# Patient Record
Sex: FEMALE | Race: BLACK OR AFRICAN AMERICAN | Employment: FULL TIME | URBAN - METROPOLITAN AREA
[De-identification: names, ages, dates, MRNs, and addresses within clinical notes are randomized per-mention and may not be internally consistent; named-entity substitution may affect disease eponyms.]

---

## 2017-01-23 ENCOUNTER — HOSPITAL ENCOUNTER (OUTPATIENT)
Dept: MAMMOGRAPHY | Age: 47
Discharge: HOME | End: 2017-01-23
Attending: FAMILY MEDICINE
Payer: COMMERCIAL

## 2017-01-23 ENCOUNTER — OFFICE VISIT (OUTPATIENT)
Dept: FAMILY MEDICINE CLINIC | Age: 47
End: 2017-01-23

## 2017-01-23 VITALS
OXYGEN SATURATION: 96 % | DIASTOLIC BLOOD PRESSURE: 85 MMHG | SYSTOLIC BLOOD PRESSURE: 132 MMHG | HEIGHT: 64 IN | WEIGHT: 187 LBS | RESPIRATION RATE: 16 BRPM | TEMPERATURE: 98.8 F | HEART RATE: 90 BPM | BODY MASS INDEX: 31.92 KG/M2

## 2017-01-23 DIAGNOSIS — Z00.00 ROUTINE GENERAL MEDICAL EXAMINATION AT A HEALTH CARE FACILITY: Primary | ICD-10-CM

## 2017-01-23 DIAGNOSIS — K58.0 IRRITABLE BOWEL SYNDROME WITH DIARRHEA: ICD-10-CM

## 2017-01-23 DIAGNOSIS — K21.9 GASTROESOPHAGEAL REFLUX DISEASE WITHOUT ESOPHAGITIS: ICD-10-CM

## 2017-01-23 DIAGNOSIS — Z13.1 SCREENING FOR DIABETES MELLITUS: ICD-10-CM

## 2017-01-23 DIAGNOSIS — Z12.31 VISIT FOR SCREENING MAMMOGRAM: ICD-10-CM

## 2017-01-23 DIAGNOSIS — Z23 ENCOUNTER FOR IMMUNIZATION: ICD-10-CM

## 2017-01-23 DIAGNOSIS — E55.9 HYPOVITAMINOSIS D: ICD-10-CM

## 2017-01-23 LAB
BILIRUB UR QL STRIP: NEGATIVE
GLUCOSE UR-MCNC: NEGATIVE MG/DL
HBA1C MFR BLD HPLC: 6 %
KETONES P FAST UR STRIP-MCNC: NEGATIVE MG/DL
PH UR STRIP: 7 [PH] (ref 4.6–8)
PROT UR QL STRIP: NEGATIVE MG/DL
SP GR UR STRIP: 1.01 (ref 1–1.03)
UA UROBILINOGEN AMB POC: NORMAL (ref 0.2–1)
URINALYSIS CLARITY POC: CLEAR
URINALYSIS COLOR POC: YELLOW
URINE BLOOD POC: NEGATIVE
URINE LEUKOCYTES POC: NORMAL
URINE NITRITES POC: NEGATIVE

## 2017-01-23 PROCEDURE — 77067 SCR MAMMO BI INCL CAD: CPT

## 2017-01-23 RX ORDER — PANTOPRAZOLE SODIUM 40 MG/1
40 TABLET, DELAYED RELEASE ORAL DAILY
Qty: 30 TAB | Refills: 3 | Status: SHIPPED | OUTPATIENT
Start: 2017-01-23 | End: 2018-01-31 | Stop reason: SDUPTHER

## 2017-01-23 RX ORDER — SODIUM FLUORIDE/POTASSIUM NIT 1.1 %-5 %
PASTE (ML) DENTAL
Refills: 3 | COMMUNITY
Start: 2016-11-09 | End: 2021-11-15 | Stop reason: ALTCHOICE

## 2017-01-23 NOTE — PROGRESS NOTES
Subjective:   51 y.o. female for Well Woman Check. Her gyne and breast care is done elsewhere by her Ob-Gyne physician. Patient Active Problem List   Diagnosis Code    Hypovitaminosis D E55.9    Herpes genitalia A60.00       Current Outpatient Prescriptions   Medication Sig Dispense Refill    PREVIDENT 5000 ENAMEL PROTECT 1.1-5 % pste APPLY TWO TIMES A DAY  3       No Known Allergies    Past Medical History   Diagnosis Date    Environmental allergies     GERD (gastroesophageal reflux disease)     Herpes genitalia        Past Surgical History   Procedure Laterality Date    Hx gyn  1991     Laser- HPV       Family History   Problem Relation Age of Onset    Heart Disease Mother     Bleeding Prob Mother      was on coumadin    Stroke Father     Cancer Sister      lymphoma    Diabetes Brother     Schizophrenia Brother     Diabetes Sister     Diabetes Sister     Mental Retardation Sister     No Known Problems Sister     Other Brother      paralysis from car accident    Heart Disease Brother     Pacemaker Brother        Social History   Substance Use Topics    Smoking status: Never Smoker    Smokeless tobacco: Never Used    Alcohol use Yes      Comment: occasional            ROS: Feeling generally well. No TIA's or unusual headaches, no dysphagia. No prolonged cough. No dyspnea or chest pain on exertion. No abdominal pain, change in bowel habits, black or bloody stools. No urinary tract symptoms. No new or unusual musculoskeletal symptoms. C/o stomach cramping and having the urgency for BM. Stools are usually loose. Has not been able to note what food triggers her sx. Also has belching and burping after eating and sometimes has heart burn in the chest.  Sx occur about 3-4 times in a week. Started with issues with stomach about 3 to 4 months ago. No nausea or vomiting noted. No blood or mucous in the stool. Has a niece with Crohn's disease noted in family hx. Objective:    The patient appears well, alert, oriented x 3, in no distress. Visit Vitals    /85 (BP 1 Location: Left arm, BP Patient Position: Sitting)    Pulse 90    Temp 98.8 °F (37.1 °C) (Oral)    Resp 16    Ht 5' 4\" (1.626 m)    Wt 187 lb (84.8 kg)    LMP 01/08/2017    SpO2 96%    BMI 32.1 kg/m2     ENT normal.  Neck supple. No adenopathy or thyromegaly. RADHA. Lungs are clear, good air entry, no wheezes, rhonchi or rales. S1 and S2 normal, no murmurs, regular rate and rhythm. Abdomen soft without tenderness, guarding, mass or organomegaly. Extremities show no edema, normal peripheral pulses. Neurological is normal, no focal findings. Breast and Pelvic exams are deferred. Assessment/Plan:   Well Woman  lose weight, increase physical activity, limit alcohol consumption, follow low fat diet, follow low salt diet, routine labs ordered  Elma Griffin was seen today for complete physical.    Diagnoses and all orders for this visit:    Routine general medical examination at a health care facility  -     AMB POC URINALYSIS DIP STICK AUTO W/O MICRO  -     CBC W/O DIFF  -     LIPID PANEL  -     METABOLIC PANEL, COMPREHENSIVE    Irritable bowel syndrome with diarrhea  -     CELIAC ANTIBODY PROFILE  -     FOOD ALLERGY PROFILE  -     REFERRAL TO GASTROENTEROLOGY  -     US ABD COMP; Future    Gastroesophageal reflux disease without esophagitis  -     REFERRAL TO GASTROENTEROLOGY  -     US ABD COMP; Future  -     pantoprazole (PROTONIX) 40 mg tablet; Take 1 Tab by mouth daily. The pathophysiology of reflux is discussed. Anti-reflux measures such as raising the head of the bed, avoiding tight clothing or belts, avoiding eating late at night and not lying down shortly after mealtime and achieving weight loss are discussed.  Avoid ASA, NSAID's, caffeine, peppermints, chocolate and other food triggers, alcohol and tobacco.     Hypovitaminosis D  -     VITAMIN D, 25 HYDROXY    Screening for diabetes mellitus  -     AMB POC HEMOGLOBIN A1C    Encounter for immunization  -     Influenza virus vaccine (QUADRIVALENT PRES FREE SYRINGE) IM 3 years and older  -     HI IMMUNIZ ADMIN,1 SINGLE/COMB VAC/TOXOID      Follow-up Disposition:  Return in about 3 months (around 4/23/2017). reviewed diet, exercise and weight control  cardiovascular risk and specific lipid/LDL goals reviewed  reviewed medications and side effects in detail    I have discussed diagnosis listed in this note with pt and/or family. I have discussed treatment plans and options and the risk/benefit analysis of those options, including safe use of medications and possible medication side effects. Through the use of shared decision making we have agreed to the above plan. The patient has received an after-visit summary and questions were answered concerning future plans and follow up. Advise pt of any urgent changes then to proceed to the ER.

## 2017-01-23 NOTE — PATIENT INSTRUCTIONS
Irritable Bowel Syndrome: Care Instructions  Your Care Instructions  Irritable bowel syndrome, or IBS, is a problem with the intestines that causes belly pain, bloating, gas, constipation, and diarrhea. The cause of IBS is not well known. IBS can last for many years, but it does not get worse over time or lead to serious disease. Most people can control their symptoms by changing their diet and reducing stress. Follow-up care is a key part of your treatment and safety. Be sure to make and go to all appointments, and call your doctor if you are having problems. It's also a good idea to know your test results and keep a list of the medicines you take. How can you care for yourself at home? · For constipation:  ¨ Include fruits, vegetables, beans, and whole grains in your diet each day. These foods are high in fiber. ¨ Drink plenty of fluids, enough so that your urine is light yellow or clear like water. If you have kidney, heart, or liver disease and have to limit fluids, talk with your doctor before you increase the amount of fluids you drink. ¨ Get some exercise every day. Build up slowly to 30 to 60 minutes a day on 5 or more days of the week. ¨ Take a fiber supplement, such as Citrucel or Metamucil, every day if needed. Read and follow all instructions on the label. ¨ Schedule time each day for a bowel movement. Having a daily routine may help. Take your time and do not strain when having a bowel movement. · If you often have diarrhea, limit foods and drinks that make it worse. These are different for each person but may include caffeine (found in coffee, tea, chocolate, and cola drinks), alcohol, fatty foods, gas-producing foods (such as beans, cabbage, and broccoli), some dairy products, and spicy foods. Do not eat candy or gum that contains sorbitol. · Keep a daily diary of what you eat and what symptoms you have. This may help find foods that cause you problems. · Eat slowly.  Try to make mealtime relaxing. · Find ways to reduce stress. · Get at least 30 minutes of exercise on most days of the week. Exercise can help reduce tension and prevent constipation. Walking is a good choice. You also may want to do other activities, such as running, swimming, cycling, or playing tennis or team sports. When should you call for help? Call your doctor now or seek immediate medical care if:  · Your pain is different than usual or occurs with fever. · You lose weight without trying, or you lose your appetite and you do not know why. · Your symptoms often wake you from sleep. · Your stools are black and tarlike or have streaks of blood. Watch closely for changes in your health, and be sure to contact your doctor if:  · Your IBS symptoms get worse or begin to disrupt your day-to-day life. · You become more tired than usual.  · Your home treatment stops working. Where can you learn more? Go to http://[facility].info/. Enter H159 in the search box to learn more about \"Irritable Bowel Syndrome: Care Instructions. \"  Current as of: August 9, 2016  Content Version: 11.1  © 9475-9789 [employer]. Care instructions adapted under license by [employer] (which disclaims liability or warranty for this information). If you have questions about a medical condition or this instruction, always ask your healthcare professional. [address] any warranty or liability for your use of this information. Diet for Irritable Bowel Syndrome: Care Instructions  Your Care Instructions  Irritable bowel syndrome, or IBS, is a problem with the intestines. IBS can cause belly pain, bloating, gas, constipation, and diarrhea. Most people can control their symptoms by changing their diet and easing stress. No specific foods cause everyone with IBS to have symptoms. Doctors don't offer a specific diet to manage symptoms.  But many people find that they feel better when they stop eating certain foods. A high-fiber diet may help if you have constipation. Follow-up care is a key part of your treatment and safety. Be sure to make and go to all appointments, and call your doctor if you are having problems. It's also a good idea to know your test results and keep a list of the medicines you take. How can you care for yourself at home? To reduce constipation  · Include fruits, vegetables, beans, and whole grains in your diet each day. These foods are high in fiber. Slowly increase the amount of fiber you eat. This helps you avoid a lot of gas. · Drink plenty of fluids, enough so that your urine is light yellow or clear like water. If you have kidney, heart, or liver disease and have to limit fluids, talk with your doctor before you increase the amount of fluids you drink. · Get some exercise every day. Build up slowly to 30 to 60 minutes a day on 5 or more days of the week. · Take a fiber supplement, such as Citrucel or Metamucil, every day if needed. Read and follow all instructions on the label. · Schedule time each day for a bowel movement. Having a daily routine may help. Take your time and do not strain when having a bowel movement. · Check with your doctor before you increase the amount of fiber in your diet. For some people who have IBS, eating more fiber may make some symptoms worse. This includes bloating. To reduce diarrhea  You may try giving up foods or drinks one at a time to see whether symptoms improve.  Limit or avoid the following:  · Alcohol  · Caffeine, which is found in coffee, tea, cola drinks, and chocolate  · Nicotine, from smoking or chewing tobacco  · Gas-producing foods, such as beans, broccoli, cabbage, and apples  · Dairy products that contain lactose (milk sugar), such as ice cream, milk, cheese, and sour cream  · Foods and drinks high in sugar, especially fruit juice, soda, candy, and other packaged sweets (such as cookies)  · Foods high in fat, including jennings, sausage, butter, oils, and anything deep-fried  · Sorbitol and xylitol, artificial sweeteners found in some sugarless candies and chewing gum  Keep track of foods  · Some people with IBS use a daily food diary to keep track of what they eat and whether they have any symptoms after eating certain foods. The diary also can be a good way to record what is going on in your life. · Stress plays a role in IBS. So if you are aware that certain stresses bring on symptoms, you can try to reduce those stresses. Keep mealtimes pleasant  · Try to maintain a pleasant environment when you eat. This may reduce stress that can make symptoms likely to occur. · Give yourself plenty of time to eat, rather than eating on the go. Chew your food slowly. Try not to swallow air, which can cause bloating. Where can you learn more? Go to http://[facility]carlos-[facility]anna.info/. Enter S488 in the search box to learn more about \"Diet for Irritable Bowel Syndrome: Care Instructions. \"  Current as of: July 26, 2016  Content Version: 11.1  © 7408-3875 [employer]. Care instructions adapted under license by [employer] (which disclaims liability or warranty for this information). If you have questions about a medical condition or this instruction, always ask your healthcare professional. [address] any warranty or liability for your use of this information. Celiac Disease: Care Instructions  Your Care Instructions  Celiac disease (or celiac sprue) is a problem with digesting gluten. Gluten is a type of protein found in wheat, rye, and other grains. This problem starts when the body's immune system attacks the small intestine when gluten is eaten. The immune system is supposed to fight off viruses and other invaders, but sometimes it turns on the person's own body. (This is called an autoimmune disease.) Celiac disease seems to run in families.   Celiac disease causes damage to the small intestine. This makes it hard for the body to absorb vitamins and other nutrients. You cannot prevent celiac disease. But you can stop and reverse the damage to the small intestine by eating a strict gluten-free diet. Follow-up care is a key part of your treatment and safety. Be sure to make and go to all appointments, and call your doctor if you are having problems. It's also a good idea to know your test results and keep a list of the medicines you take. How can you care for yourself at home? · Eat a gluten-free diet to prevent symptoms and damage to the small intestine. Even a small amount of gluten may cause damage. ¨ Avoid all foods that contain wheat, rye, and barley gluten. Bread, bagels, pasta, pizza, malted breakfast cereals, and crackers are all examples of foods that contain gluten. ¨ Avoid oats, at least at first. Oats cause symptoms in some people. After your symptoms stop, you may be able to add oats to your diet. · You may need to avoid milk and milk products for a while. Once you stop eating any gluten, the intestine will begin to heal. Then it should be okay to drink milk and eat milk products. · Read food labels carefully and look for hidden gluten, such as gluten in medicine and some food additives. If a label says \"modified food starch,\" the product may contain gluten. · Plan your diet around:  ¨ Eggs. ¨ Dairy products, if you can eat them. Cheese, yogurt, and other dairy products can be an important part of the diet. ¨ Flours and foods made with amaranth, arrowroot, beans, buckwheat, corn, cornmeal, flax, millet, potatoes, pure uncontaminated nut and oat bran, quinoa, rice, sorghum, soybeans, tapioca, or teff. ¨ Fresh, frozen, and canned meats, fruits, and vegetables. Watch for added gluten. · Talk to your doctor or contact your local hospital or dietitian for information about support groups in your area.  You may find a support group helpful for discovering ways to help you deal with celiac disease. Celiac disease support groups often share recipes and good food sources. · Look for gluten-free foods. Many food stores, especially health food stores, offer specially marked gluten-free food. When should you call for help? Watch closely for changes in your health, and be sure to contact your doctor if:  · Your bloating, gas, and diarrhea get worse. · You have bloating, gas, and diarrhea after not having them for a while. Where can you learn more? Go to http://jcarlos-breanna.info/. Enter [id number] in the search box to learn more about \"Celiac Disease: Care Instructions. \"  Current as of: August 9, 2016  Content Version: 11.1  © 4298-6953 [employer]. Care instructions adapted under license by [employer] (which disclaims liability or warranty for this information). If you have questions about a medical condition or this instruction, always ask your healthcare professional. [place] [address] any warranty or liability for your use of this information. Gluten-Free Diet: Care Instructions  Your Care Instructions  To help your symptoms, your doctor has recommended a gluten-free diet. This means not eating foods that have gluten in them. Gluten is a kind of protein. It's found in wheat, barley, and rye. If you eat a gluten-free diet, you can help manage your symptoms and prevent long-term problems. You can also get all the nutrition you need. Follow-up care is a key part of your treatment and safety. Be sure to make and go to all appointments, and call your doctor if you are having problems. It's also a good idea to know your test results and keep a list of the medicines you take. How can you care for yourself at home? · Don't eat any foods that have gluten in them. These include bagels, bread, crackers, and some cereals. They also include pasta and pizza. · Carefully read food labels.  Look for wheat or wheat products in ice cream and candy. You may also find them in salad dressing, canned and frozen soups and vegetables, and other processed foods. · Avoid all beer products unless the label says they are gluten-free. Beers with and without alcohol have gluten unless the labels say they are gluten-free. This includes lagers, ales, and stouts. · When you eat out, look for restaurants that serve gluten-free food. You can also ask if the  is familiar with gluten-free cooking. · Try to learn more about gluten-free options. Find grocery stores that sell gluten-free pizza and other foods. If you have access to the Internet, look online for gluten-free foods and recipes. · On a gluten-free eating plan, it's okay to have:  ¨ Eggs and dairy products. (But some dairy products may make your symptoms worse. Ask your doctor if you have questions about dairy products. Read ingredient labels carefully. Some processed cheeses contain gluten.)  ¨ Flours and foods made with amaranth, arrowroot, beans, buckwheat, corn, cornmeal, flax, millet, potatoes, pure uncontaminated nut and oat bran, quinoa, rice, sorghum, soybeans, tapioca, or teff. ¨ Fresh, frozen, or canned unprocessed meats. But avoid processed meats. Some examples of processed meats to avoid are hot dogs, salami, and deli meat. Read labels for additives that may contain gluten. ¨ Fresh, frozen, dried, or canned fruits and vegetables, if they do not have thickeners or other additives that contain gluten. ¨ Some alcohol drinks. These include wine, liqueurs, and ciders. They also include liquor like whiskey and lien. When should you call for help? Watch closely for changes in your health, and be sure to contact your doctor if:  · You have unexplained weight loss. · You have diarrhea that lasts longer than 1 to 2 weeks.   · You have unusual fatigue or mood changes, especially if these last more than a week and are not related to any other illness, such as the flu.  · Your symptoms come back again. · Your stomach pain gets worse. Where can you learn more? Go to http://jcarlos-breanna.info/. Enter [id number] in the search box to learn more about \"Gluten-Free Diet: Care Instructions. \"  Current as of: July 26, 2016  Content Version: 11.1  © 3194-8198 [employer]. Care instructions adapted under license by [employer] (which disclaims liability or warranty for this information). If you have questions about a medical condition or this instruction, always ask your healthcare professional. [address] any warranty or liability for your use of this information.

## 2017-01-23 NOTE — MR AVS SNAPSHOT
Visit Information Date & Time Provider Department Dept. Phone Encounter #  
 1/23/2017  2:15 PM Altaf FrankelGerber [phone] [id number] Follow-up Instructions Return in about 3 months (around 4/23/2017). Upcoming Health Maintenance Date Due  
 PAP AKA CERVICAL CYTOLOGY 2/11/2017 BREAST CANCER SCRN MAMMOGRAM 1/23/2018 DTaP/Tdap/Td series (2 - Td) 3/5/2024 Allergies as of 1/23/2017  Review Complete On: 1/23/2017 By: Altaf Frankel MD  
 No Known Allergies Current Immunizations  Reviewed on 1/23/2017 Name Date Influenza Vaccine [place] Going) 9/2/2015 Influenza Vaccine (Quad) PF 1/23/2017 Tdap 3/5/2014 Reviewed by Altaf Frankel MD on 1/23/2017 at  2:29 PM  
 Reviewed by Sathya Chan LPN on 5/19/2180 at  2:35 PM  
You Were Diagnosed With   
  
 Codes Comments Routine general medical examination at a health care facility    -  Primary ICD-10-CM: Z00.00 ICD-9-CM: V70.0 Irritable bowel syndrome with diarrhea     ICD-10-CM: K58.0 ICD-9-CM: 908.7 Gastroesophageal reflux disease without esophagitis     ICD-10-CM: K21.9 ICD-9-CM: 530.81 Hypovitaminosis D     ICD-10-CM: E55.9 ICD-9-CM: 268.9 Screening for diabetes mellitus     ICD-10-CM: Z13.1 ICD-9-CM: V77.1 Encounter for immunization     ICD-10-CM: D28 ICD-9-CM: V03.89 Vitals BP Pulse Temp Resp Height(growth percentile) Weight(growth percentile) 132/85 (BP 1 Location: Left arm, BP Patient Position: Sitting) 90 98.8 °F (37.1 °C) (Oral) 16 5' 4\" (1.626 m) 187 lb (84.8 kg) LMP SpO2 BMI OB Status Smoking Status 01/08/2017 96% 32.1 kg/m2 Having regular periods Never Smoker Vitals History BMI and BSA Data Body Mass Index Body Surface Area  
 32.1 kg/m 2 1.96 m 2 Preferred Pharmacy Pharmacy Name Phone [facility] [address] [phone] Your Updated Medication List  
  
   
This list is accurate as of: 1/23/17  3:21 PM.  Always use your most recent med list.  
  
  
  
  
 pantoprazole 40 mg tablet Commonly known as:  PROTONIX Take 1 Tab by mouth daily. PREVIDENT 5000 ENAMEL PROTECT 1.1-5 % Pste Generic drug:  sodium fluoride-pot nitrate APPLY TWO TIMES A DAY Prescriptions Sent to Pharmacy Refills  
 pantoprazole (PROTONIX) 40 mg tablet 3 Sig: Take 1 Tab by mouth daily. Class: Normal  
 Pharmacy: [facility] [place] [address], [address] Ph #: [phone] Route: Oral  
  
We Performed the Following AMB POC HEMOGLOBIN A1C [46052 CPT(R)] AMB POC URINALYSIS DIP STICK AUTO W/O MICRO [55208 CPT(R)] CBC W/O DIFF [80104 CPT(R)] CELIAC ANTIBODY PROFILE [[id number] Custom] FOOD ALLERGY PROFILE [53153 CPT(R)] INFLUENZA VIRUS VAC QUAD,SPLIT,PRESV FREE SYRINGE 3/> YRS IM [id number] CPT(R)] LIPID PANEL [05947 CPT(R)] METABOLIC PANEL, COMPREHENSIVE [20871 CPT(R)] TX IMMUNIZ ADMIN,1 SINGLE/COMB VAC/TOXOID [id number] CPT(R)] REFERRAL TO GASTROENTEROLOGY [KCL87 Custom] VITAMIN D, 25 HYDROXY [id number] CPT(R)] Follow-up Instructions Return in about 3 months (around 4/23/2017). To-Do List   
 01/23/2017 Imaging:  US ABD COMP Referral Information Referral ID Referred By Referred To  
  
 [id number] [facility] [address] [place], [address] [facility] Visits Status Start Date End Date 1 New Request 1/23/17 1/23/18 If your referral has a status of pending review or denied, additional information will be sent to support the outcome of this decision. Patient Instructions Irritable Bowel Syndrome: Care Instructions Your Care Instructions Irritable bowel syndrome, or IBS, is a problem with the intestines that causes belly pain, bloating, gas, constipation, and diarrhea. The cause of IBS is not well known. IBS can last for many years, but it does not get worse over time or lead to serious disease. Most people can control their symptoms by changing their diet and reducing stress. Follow-up care is a key part of your treatment and safety. Be sure to make and go to all appointments, and call your doctor if you are having problems. It's also a good idea to know your test results and keep a list of the medicines you take. How can you care for yourself at home? · For constipation: 
¨ Include fruits, vegetables, beans, and whole grains in your diet each day. These foods are high in fiber. ¨ Drink plenty of fluids, enough so that your urine is light yellow or clear like water. If you have kidney, heart, or liver disease and have to limit fluids, talk with your doctor before you increase the amount of fluids you drink. ¨ Get some exercise every day. Build up slowly to 30 to 60 minutes a day on 5 or more days of the week. ¨ Take a fiber supplement, such as Citrucel or Metamucil, every day if needed. Read and follow all instructions on the label. ¨ Schedule time each day for a bowel movement. Having a daily routine may help. Take your time and do not strain when having a bowel movement. · If you often have diarrhea, limit foods and drinks that make it worse. These are different for each person but may include caffeine (found in coffee, tea, chocolate, and cola drinks), alcohol, fatty foods, gas-producing foods (such as beans, cabbage, and broccoli), some dairy products, and spicy foods. Do not eat candy or gum that contains sorbitol. · Keep a daily diary of what you eat and what symptoms you have. This may help find foods that cause you problems. · Eat slowly. Try to make mealtime relaxing. · Find ways to reduce stress. · Get at least 30 minutes of exercise on most days of the week.  Exercise can help reduce tension and prevent constipation. Walking is a good choice. You also may want to do other activities, such as running, swimming, cycling, or playing tennis or team sports. When should you call for help? Call your doctor now or seek immediate medical care if: 
· Your pain is different than usual or occurs with fever. · You lose weight without trying, or you lose your appetite and you do not know why. · Your symptoms often wake you from sleep. · Your stools are black and tarlike or have streaks of blood. Watch closely for changes in your health, and be sure to contact your doctor if: 
· Your IBS symptoms get worse or begin to disrupt your day-to-day life. · You become more tired than usual. 
· Your home treatment stops working. Where can you learn more? Go to http://jcarlos-breanna.info/. Enter Z674 in the search box to learn more about \"Irritable Bowel Syndrome: Care Instructions. \" Current as of: August 9, 2016 Content Version: 11.1 © 0055-3354 [employer]GN ([employer]). Care instructions adapted under license by [employer] (which disclaims liability or warranty for this information). If you have questions about a medical condition or this instruction, always ask your healthcare professional. [address] any warranty or liability for your use of this information. Diet for Irritable Bowel Syndrome: Care Instructions Your Care Instructions Irritable bowel syndrome, or IBS, is a problem with the intestines. IBS can cause belly pain, bloating, gas, constipation, and diarrhea. Most people can control their symptoms by changing their diet and easing stress. No specific foods cause everyone with IBS to have symptoms. Doctors don't offer a specific diet to manage symptoms. But many people find that they feel better when they stop eating certain foods. A high-fiber diet may help if you have constipation. Follow-up care is a key part of your treatment and safety. Be sure to make and go to all appointments, and call your doctor if you are having problems. It's also a good idea to know your test results and keep a list of the medicines you take. How can you care for yourself at home? To reduce constipation · Include fruits, vegetables, beans, and whole grains in your diet each day. These foods are high in fiber. Slowly increase the amount of fiber you eat. This helps you avoid a lot of gas. · Drink plenty of fluids, enough so that your urine is light yellow or clear like water. If you have kidney, heart, or liver disease and have to limit fluids, talk with your doctor before you increase the amount of fluids you drink. · Get some exercise every day. Build up slowly to 30 to 60 minutes a day on 5 or more days of the week. · Take a fiber supplement, such as Citrucel or Metamucil, every day if needed. Read and follow all instructions on the label. · Schedule time each day for a bowel movement. Having a daily routine may help. Take your time and do not strain when having a bowel movement. · Check with your doctor before you increase the amount of fiber in your diet. For some people who have IBS, eating more fiber may make some symptoms worse. This includes bloating. To reduce diarrhea You may try giving up foods or drinks one at a time to see whether symptoms improve. Limit or avoid the following: · Alcohol · Caffeine, which is found in coffee, tea, cola drinks, and chocolate · Nicotine, from smoking or chewing tobacco 
· Gas-producing foods, such as beans, broccoli, cabbage, and apples · Dairy products that contain lactose (milk sugar), such as ice cream, milk, cheese, and sour cream 
· Foods and drinks high in sugar, especially fruit juice, soda, candy, and other packaged sweets (such as cookies) · Foods high in fat, including jennings, sausage, butter, oils, and anything deep-fried · Sorbitol and xylitol, artificial sweeteners found in some sugarless candies and chewing gum Keep track of foods · Some people with IBS use a daily food diary to keep track of what they eat and whether they have any symptoms after eating certain foods. The diary also can be a good way to record what is going on in your life. · Stress plays a role in IBS. So if you are aware that certain stresses bring on symptoms, you can try to reduce those stresses. Keep mealtimes pleasant · Try to maintain a pleasant environment when you eat. This may reduce stress that can make symptoms likely to occur. · Give yourself plenty of time to eat, rather than eating on the go. Chew your food slowly. Try not to swallow air, which can cause bloating. Where can you learn more? Go to http://jcarlos-breanna.info/. Enter R549 in the search box to learn more about \"Diet for Irritable Bowel Syndrome: Care Instructions. \" Current as of: July 26, 2016 Content Version: 11.1 © 8276-0184 [employer]ch[employer]ch[employer] publishing. Care instructions adapted under license by [employer] (which disclaims liability or warranty for this information). If you have questions about a medical condition or this instruction, always ask your healthcare professional. [place] [address] any warranty or liability for your use of this information. Celiac Disease: Care Instructions Your Care Instructions Celiac disease (or celiac sprue) is a problem with digesting gluten. Gluten is a type of protein found in wheat, rye, and other grains. This problem starts when the body's immune system attacks the small intestine when gluten is eaten. The immune system is supposed to fight off viruses and other invaders, but sometimes it turns on the person's own body. (This is called an autoimmune disease.) Celiac disease seems to run in families. Celiac disease causes damage to the small intestine.  This makes it hard for the body to absorb vitamins and other nutrients. You cannot prevent celiac disease. But you can stop and reverse the damage to the small intestine by eating a strict gluten-free diet. Follow-up care is a key part of your treatment and safety. Be sure to make and go to all appointments, and call your doctor if you are having problems. It's also a good idea to know your test results and keep a list of the medicines you take. How can you care for yourself at home? · Eat a gluten-free diet to prevent symptoms and damage to the small intestine. Even a small amount of gluten may cause damage. ¨ Avoid all foods that contain wheat, rye, and barley gluten. Bread, bagels, pasta, pizza, malted breakfast cereals, and crackers are all examples of foods that contain gluten. ¨ Avoid oats, at least at first. Oats cause symptoms in some people. After your symptoms stop, you may be able to add oats to your diet. · You may need to avoid milk and milk products for a while. Once you stop eating any gluten, the intestine will begin to heal. Then it should be okay to drink milk and eat milk products. · Read food labels carefully and look for hidden gluten, such as gluten in medicine and some food additives. If a label says \"modified food starch,\" the product may contain gluten. · Plan your diet around: 
¨ Eggs. ¨ Dairy products, if you can eat them. Cheese, yogurt, and other dairy products can be an important part of the diet. ¨ Flours and foods made with amaranth, arrowroot, beans, buckwheat, corn, cornmeal, flax, millet, potatoes, pure uncontaminated nut and oat bran, quinoa, rice, sorghum, soybeans, tapioca, or teff. ¨ Fresh, frozen, and canned meats, fruits, and vegetables. Watch for added gluten. · Talk to your doctor or contact your local hospital or dietitian for information about support groups in your area. You may find a support group helpful for discovering ways to help you deal with celiac disease. Celiac disease support groups often share recipes and good food sources. · Look for gluten-free foods. Many food stores, especially health food stores, offer specially marked gluten-free food. When should you call for help? Watch closely for changes in your health, and be sure to contact your doctor if: 
· Your bloating, gas, and diarrhea get worse. · You have bloating, gas, and diarrhea after not having them for a while. Where can you learn more? Go to http://jcarlos-breanna.info/. Enter [id number] in the search box to learn more about \"Celiac Disease: Care Instructions. \" Current as of: August 9, 2016 Content Version: 11.1 © 9448-6228 [employer]. Care instructions adapted under license by [employer] (which disclaims liability or warranty for this information). If you have questions about a medical condition or this instruction, always ask your healthcare professional. [address] any warranty or liability for your use of this information. Gluten-Free Diet: Care Instructions Your Care Instructions To help your symptoms, your doctor has recommended a gluten-free diet. This means not eating foods that have gluten in them. Gluten is a kind of protein. It's found in wheat, barley, and rye. If you eat a gluten-free diet, you can help manage your symptoms and prevent long-term problems. You can also get all the nutrition you need. Follow-up care is a key part of your treatment and safety. Be sure to make and go to all appointments, and call your doctor if you are having problems. It's also a good idea to know your test results and keep a list of the medicines you take. How can you care for yourself at home? · Don't eat any foods that have gluten in them. These include bagels, bread, crackers, and some cereals. They also include pasta and pizza. · Carefully read food labels.  Look for wheat or wheat products in ice cream and candy. You may also find them in salad dressing, canned and frozen soups and vegetables, and other processed foods. · Avoid all beer products unless the label says they are gluten-free. Beers with and without alcohol have gluten unless the labels say they are gluten-free. This includes lagers, ales, and stouts. · When you eat out, look for restaurants that serve gluten-free food. You can also ask if the  is familiar with gluten-free cooking. · Try to learn more about gluten-free options. Find grocery stores that sell gluten-free pizza and other foods. If you have access to the Internet, look online for gluten-free foods and recipes. · On a gluten-free eating plan, it's okay to have: 
¨ Eggs and dairy products. (But some dairy products may make your symptoms worse. Ask your doctor if you have questions about dairy products. Read ingredient labels carefully. Some processed cheeses contain gluten.) ¨ Flours and foods made with amaranth, arrowroot, beans, buckwheat, corn, cornmeal, flax, millet, potatoes, pure uncontaminated nut and oat bran, quinoa, rice, sorghum, soybeans, tapioca, or teff. ¨ Fresh, frozen, or canned unprocessed meats. But avoid processed meats. Some examples of processed meats to avoid are hot dogs, salami, and deli meat. Read labels for additives that may contain gluten. ¨ Fresh, frozen, dried, or canned fruits and vegetables, if they do not have thickeners or other additives that contain gluten. ¨ Some alcohol drinks. These include wine, liqueurs, and ciders. They also include liquor like whiskey and lien. When should you call for help? Watch closely for changes in your health, and be sure to contact your doctor if: 
· You have unexplained weight loss. · You have diarrhea that lasts longer than 1 to 2 weeks. · You have unusual fatigue or mood changes, especially if these last more than a week and are not related to any other illness, such as the flu. · Your symptoms come back again. · Your stomach pain gets worse. Where can you learn more? Go to http://jcarlos-breanna.info/. Enter [id number] 19 in the search box to learn more about \"Gluten-Free Diet: Care Instructions. \" Current as of: July 26, 2016 Content Version: 11.1 © 3272-4582 [employer] ([employer] united). Care instructions adapted under license by [employer] (which disclaims liability or warranty for this information). If you have questions about a medical condition or this instruction, always ask your healthcare professional. [address] any warranty or liability for your use of this information. Introducing [facility]! [employer] introduces [employer] patient portal. Now you can access parts of your medical record, email your doctor's office, and request medication refills online. 1. In your internet browser, go to [email or web address] [email or web address] 2. Click on the First Time User? Click Here link in the Sign In box. You will see the New Member Sign Up page. 3. Enter your Merus Access Code exactly as it appears below. You will not need to use this code after youve completed the sign-up process. If you do not sign up before the expiration date, you must request a new code. · Merus Access Code: [email or web address] Expires: 2/19/2017  5:12 PM 
 
4. Enter the last four digits of your Social Security Number (xxxx) and Date of Birth (mm/dd/yyyy) as indicated and click Submit. You will be taken to the next sign-up page. 5. Create a CatchFreet ID. This will be your Merus login ID and cannot be changed, so think of one that is secure and easy to remember. 6. Create a Merus password. You can change your password at any time. 7. Enter your Password Reset Question and Answer. This can be used at a later time if you forget your password. 8. Enter your e-mail address.  You will receive e-mail notification when new information is available in FatRedCouch. 9. Click Sign Up. You can now view and download portions of your medical record. 10. Click the Download Summary menu link to download a portable copy of your medical information. If you have questions, please visit the Frequently Asked Questions section of the FatRedCouch website. Remember, FatRedCouch is NOT to be used for urgent needs. For medical emergencies, dial 911. Now available from your iPhone and Android! Please provide this summary of care documentation to your next provider. Your primary care clinician is listed as Prasanth Dumont. If you have any questions after today's visit, please call [phone].

## 2017-01-23 NOTE — PROGRESS NOTES
Chief Complaint   Patient presents with    Complete Physical     no pap     Pt states she goes to OB GYN. LMP 1/8/2017      Mammogram 1/23/2017     Verbal order received per Dr. Rivera- obtain UA without micro- VORB    Verbal order received per Dr. Rivera- flu vaccine IM- VORB    Pt received flu vaccine IM in right deltoid without any difficulty.

## 2017-01-26 LAB
25(OH)D3+25(OH)D2 SERPL-MCNC: 13.1 NG/ML (ref 30–100)
ALBUMIN SERPL-MCNC: 3.9 G/DL (ref 3.5–5.5)
ALBUMIN/GLOB SERPL: 1.6 {RATIO} (ref 1.1–2.5)
ALP SERPL-CCNC: 87 IU/L (ref 39–117)
ALT SERPL-CCNC: 10 IU/L (ref 0–32)
AST SERPL-CCNC: 15 IU/L (ref 0–40)
BILIRUB SERPL-MCNC: <0.2 MG/DL (ref 0–1.2)
BUN SERPL-MCNC: 9 MG/DL (ref 6–24)
BUN/CREAT SERPL: 13 (ref 9–23)
CALCIUM SERPL-MCNC: 9.2 MG/DL (ref 8.7–10.2)
CHLORIDE SERPL-SCNC: 104 MMOL/L (ref 96–106)
CHOLEST SERPL-MCNC: 189 MG/DL (ref 100–199)
CLAM IGE QN: <0.1 KU/L
CO2 SERPL-SCNC: 21 MMOL/L (ref 18–29)
CODFISH IGE QN: <0.1 KU/L
CORN IGE QN: <0.1 KU/L
COW MILK IGE QN: <0.1 KU/L
CREAT SERPL-MCNC: 0.71 MG/DL (ref 0.57–1)
EGG WHITE IGE QN: 0.11 KU/L
ERYTHROCYTE [DISTWIDTH] IN BLOOD BY AUTOMATED COUNT: 17.9 % (ref 12.3–15.4)
GLIADIN PEPTIDE IGA SER-ACNC: 6 UNITS (ref 0–19)
GLIADIN PEPTIDE IGG SER-ACNC: 4 UNITS (ref 0–19)
GLOBULIN SER CALC-MCNC: 2.5 G/DL (ref 1.5–4.5)
GLUCOSE SERPL-MCNC: 113 MG/DL (ref 65–99)
HCT VFR BLD AUTO: 31.1 % (ref 34–46.6)
HDLC SERPL-MCNC: 46 MG/DL
HGB BLD-MCNC: 9.6 G/DL (ref 11.1–15.9)
IGA SERPL-MCNC: 241 MG/DL (ref 87–352)
INTERPRETATION, 910389: NORMAL
LDLC SERPL CALC-MCNC: 94 MG/DL (ref 0–99)
Lab: ABNORMAL
MCH RBC QN AUTO: 22 PG (ref 26.6–33)
MCHC RBC AUTO-ENTMCNC: 30.9 G/DL (ref 31.5–35.7)
MCV RBC AUTO: 71 FL (ref 79–97)
PEANUT IGE QN: <0.1 KU/L
PLATELET # BLD AUTO: 355 X10E3/UL (ref 150–379)
POTASSIUM SERPL-SCNC: 4.2 MMOL/L (ref 3.5–5.2)
PROT SERPL-MCNC: 6.4 G/DL (ref 6–8.5)
RBC # BLD AUTO: 4.37 X10E6/UL (ref 3.77–5.28)
SCALLOP IGE QN: <0.1 KU/L
SESAME SEED IGE QN: <0.1 KU/L
SHRIMP IGE QN: <0.1 KU/L
SODIUM SERPL-SCNC: 140 MMOL/L (ref 134–144)
SOYBEAN IGE QN: <0.1 KU/L
TRIGL SERPL-MCNC: 246 MG/DL (ref 0–149)
TTG IGA SER-ACNC: <2 U/ML (ref 0–3)
TTG IGG SER-ACNC: <2 U/ML (ref 0–5)
VLDLC SERPL CALC-MCNC: 49 MG/DL (ref 5–40)
WALNUT IGE QN: <0.1 KU/L
WBC # BLD AUTO: 9 X10E3/UL (ref 3.4–10.8)
WHEAT IGE QN: <0.1 KU/L

## 2017-02-07 ENCOUNTER — TELEPHONE (OUTPATIENT)
Dept: FAMILY MEDICINE CLINIC | Age: 47
End: 2017-02-07

## 2017-02-07 ENCOUNTER — HOSPITAL ENCOUNTER (OUTPATIENT)
Dept: ULTRASOUND IMAGING | Age: 47
Discharge: HOME | End: 2017-02-07
Attending: FAMILY MEDICINE
Payer: COMMERCIAL

## 2017-02-07 DIAGNOSIS — K21.9 GASTROESOPHAGEAL REFLUX DISEASE WITHOUT ESOPHAGITIS: ICD-10-CM

## 2017-02-07 DIAGNOSIS — K58.0 IRRITABLE BOWEL SYNDROME WITH DIARRHEA: ICD-10-CM

## 2017-02-07 PROCEDURE — 76700 US EXAM ABDOM COMPLETE: CPT

## 2017-02-07 RX ORDER — ERGOCALCIFEROL 1.25 MG/1
50000 CAPSULE ORAL
Qty: 5 CAP | Refills: 6 | Status: SHIPPED | OUTPATIENT
Start: 2017-02-07 | End: 2017-07-31 | Stop reason: ALTCHOICE

## 2017-07-31 ENCOUNTER — OFFICE VISIT (OUTPATIENT)
Dept: FAMILY MEDICINE CLINIC | Age: 47
End: 2017-07-31

## 2017-07-31 VITALS
DIASTOLIC BLOOD PRESSURE: 86 MMHG | HEIGHT: 64 IN | BODY MASS INDEX: 30.77 KG/M2 | OXYGEN SATURATION: 99 % | SYSTOLIC BLOOD PRESSURE: 138 MMHG | HEART RATE: 88 BPM | RESPIRATION RATE: 16 BRPM | WEIGHT: 180.2 LBS | TEMPERATURE: 98.6 F

## 2017-07-31 DIAGNOSIS — E55.9 HYPOVITAMINOSIS D: ICD-10-CM

## 2017-07-31 DIAGNOSIS — K50.90 CROHN'S DISEASE WITHOUT COMPLICATION, UNSPECIFIED GASTROINTESTINAL TRACT LOCATION (HCC): ICD-10-CM

## 2017-07-31 DIAGNOSIS — D64.9 ANEMIA, UNSPECIFIED TYPE: ICD-10-CM

## 2017-07-31 DIAGNOSIS — R73.02 IGT (IMPAIRED GLUCOSE TOLERANCE): Primary | ICD-10-CM

## 2017-07-31 DIAGNOSIS — E78.00 HYPERCHOLESTEROLEMIA: ICD-10-CM

## 2017-07-31 LAB — HBA1C MFR BLD HPLC: 5.7 %

## 2017-07-31 RX ORDER — LANOLIN ALCOHOL/MO/W.PET/CERES
325 CREAM (GRAM) TOPICAL EVERY OTHER DAY
COMMUNITY
End: 2022-09-02 | Stop reason: ALTCHOICE

## 2017-07-31 RX ORDER — LANOLIN ALCOHOL/MO/W.PET/CERES
1000 CREAM (GRAM) TOPICAL DAILY
COMMUNITY
End: 2022-04-22 | Stop reason: DRUGHIGH

## 2017-07-31 RX ORDER — POLYETHYLENE GLYCOL-3350, SODIUM CHLORIDE, POTASSIUM CHLORIDE AND SODIUM BICARBONATE 420; 11.2; 5.72; 1.48 G/438.4G; G/438.4G; G/438.4G; G/438.4G
POWDER, FOR SOLUTION ORAL
COMMUNITY
Start: 2017-05-14 | End: 2017-07-31 | Stop reason: ALTCHOICE

## 2017-07-31 RX ORDER — MESALAMINE 500 MG/1
1000 CAPSULE ORAL 3 TIMES DAILY
COMMUNITY
Start: 2017-07-20 | End: 2018-08-01

## 2017-07-31 NOTE — PROGRESS NOTES
HISTORY OF PRESENT ILLNESS  Reece Villegas is a 49 y.o. female. HPI   Follow up on BS and cholesterol and Vitamin D. Overall feeling well. Glucose intolerance reveiw:  She has IGT. Diabetic ROS - further diabetic ROS: no polyuria or polydipsia, no chest pain, dyspnea or TIA's, no numbness, tingling or pain in extremities, no unusual visual symptoms. Lab review: orders written for new lab studies as appropriate; see orders. '    Cardiovascular Review:  The patient has hyperlipidemia. Diet and Lifestyle: generally follows a low fat low cholesterol diet, generally follows a low sodium diet, exercises sporadically  Home BP Monitoring: is not measured at home. Pertinent ROS: no chest pain on exertion, no dyspnea on exertion, no swelling of ankles, no palpitations. Patient Active Problem List   Diagnosis Code    Hypovitaminosis D E55.9    Herpes genitalia A60.00    Anemia D64.9    IGT (impaired glucose tolerance) R73.02       Current Outpatient Prescriptions   Medication Sig Dispense Refill    PENTASA 500 mg CR capsule Take 500 mg by mouth three (3) times daily.  CALCIUM CARB/VIT D3/MINERALS (CALCIUM-VITAMIN D PO) Take 1 Tab by mouth daily.  cyanocobalamin 1,000 mcg tablet Take 1,000 mcg by mouth daily.  ferrous sulfate 325 mg (65 mg iron) tablet Take 325 mg by mouth Daily (before breakfast).  PREVIDENT 5000 ENAMEL PROTECT 1.1-5 % pste APPLY TWO TIMES A DAY  3    pantoprazole (PROTONIX) 40 mg tablet Take 1 Tab by mouth daily.  30 Tab 3       No Known Allergies    Past Medical History:   Diagnosis Date    Crohn disease ([facility] [address]     5/2017    Environmental allergies     GERD (gastroesophageal reflux disease)     Herpes genitalia        Past Surgical History:   Procedure Laterality Date    HX GYN  1991    Laser- HPV       Family History   Problem Relation Age of Onset    Heart Disease Mother     Bleeding Prob Mother      was on coumadin    Stroke Father     Cancer Sister      lymphoma  Diabetes Brother     Schizophrenia Brother     Diabetes Sister     Diabetes Sister     Mental Retardation Sister     No Known Problems Sister     Other Brother      paralysis from car accident    Heart Disease Brother     Pacemaker Brother        Social History   Substance Use Topics    Smoking status: Never Smoker    Smokeless tobacco: Never Used    Alcohol use Yes      Comment: occasional        Lab Results  Component Value Date/Time   WBC 9.0 01/23/2017 03:12 PM   HGB 9.6 01/23/2017 03:12 PM   HCT 31.1 01/23/2017 03:12 PM   PLATELET 546 08/34/9140 03:12 PM   MCV 71 01/23/2017 03:12 PM     Lab Results  Component Value Date/Time   Cholesterol, total 189 01/23/2017 03:12 PM   HDL Cholesterol 46 01/23/2017 03:12 PM   LDL, calculated 94 01/23/2017 03:12 PM   Triglyceride 246 01/23/2017 03:12 PM   Lab Results  Component Value Date/Time   TSH 1.250 09/02/2015 10:21 AM      Lab Results   Component Value Date/Time    Sodium 140 01/23/2017 03:12 PM    Potassium 4.2 01/23/2017 03:12 PM    Chloride 104 01/23/2017 03:12 PM    CO2 21 01/23/2017 03:12 PM    Glucose 113 01/23/2017 03:12 PM    BUN 9 01/23/2017 03:12 PM    Creatinine 0.71 01/23/2017 03:12 PM    BUN/Creatinine ratio 13 01/23/2017 03:12 PM    GFR est  01/23/2017 03:12 PM    GFR est non- 01/23/2017 03:12 PM    Calcium 9.2 01/23/2017 03:12 PM    Bilirubin, total <0.2 01/23/2017 03:12 PM    ALT (SGPT) 10 01/23/2017 03:12 PM    AST (SGOT) 15 01/23/2017 03:12 PM    Alk. phosphatase 87 01/23/2017 03:12 PM    Protein, total 6.4 01/23/2017 03:12 PM    Albumin 3.9 01/23/2017 03:12 PM    A-G Ratio 1.6 01/23/2017 03:12 PM      Lab Results   Component Value Date/Time    Hemoglobin A1c 6.4 03/05/2014 12:58 PM    Hemoglobin A1c (POC) 5.7 07/31/2017 11:58 AM      Lab Results   Component Value Date/Time          VITAMIN D, 25-HYDROXY 13.1 01/23/2017 03:12 PM      Review of Systems   Constitutional: Negative for malaise/fatigue.    HENT: Negative for congestion. Eyes: Negative for blurred vision. Respiratory: Negative for cough and shortness of breath. Cardiovascular: Negative for chest pain, palpitations and leg swelling. Gastrointestinal: Negative for abdominal pain, constipation and heartburn. Genitourinary: Negative for dysuria, frequency and urgency. Musculoskeletal: Negative for back pain and joint pain. Neurological: Negative for dizziness, tingling and headaches. Endo/Heme/Allergies: Negative for environmental allergies. Psychiatric/Behavioral: Negative for depression. The patient does not have insomnia. Physical Exam   Constitutional: She appears well-developed and well-nourished. /86  Pulse 88  Temp 98.6 °F (37 °C) (Oral)   Resp 16  Ht 5' 4\" (1.626 m)  Wt 180 lb 3.2 oz (81.7 kg)  LMP 07/02/2017  SpO2 99%  BMI 30.93 kg/m2       HENT:   Right Ear: Tympanic membrane and ear canal normal.   Left Ear: Tympanic membrane and ear canal normal.   Nose: No mucosal edema or rhinorrhea. Mouth/Throat: Oropharynx is clear and moist and mucous membranes are normal.   Neck: Normal range of motion. Neck supple. No thyromegaly present. Cardiovascular: Normal rate and regular rhythm. No murmur heard. Pulmonary/Chest: Effort normal and breath sounds normal.   Abdominal: Soft. Bowel sounds are normal. There is no tenderness. Musculoskeletal: Normal range of motion. She exhibits no edema. Lymphadenopathy:     She has no cervical adenopathy. Skin: Skin is warm and dry. Psychiatric: She has a normal mood and affect. Nursing note and vitals reviewed. ASSESSMENT and PLAN  Diagnoses and all orders for this visit:    1. IGT (impaired glucose tolerance)  -     AMB POC HEMOGLOBIN A1C    2. Anemia, unspecified type  -     AMB POC COMPLETE CBC, AUTOMATED  Periods have not been as heavy. Colonoscopy showed Crohn Disease per followed by GI.      3. Hypercholesterolemia  -     LIPID PANEL    4.  Hypovitaminosis D  - VITAMIN D, 25 HYDROXY    5. Crohn's Disease  As per GI    Follow-up Disposition:  Return in about 6 months (around 1/17/2018) for physical.  reviewed diet, exercise and weight control  cardiovascular risk and specific lipid/LDL goals reviewed  reviewed medications and side effects in detail  specific diabetic recommendations: low cholesterol diet, weight control and daily exercise discussed and glycohemoglobin and other lab monitoring discussed     I have discussed diagnosis listed in this note with pt and/or family. I have discussed treatment plans and options and the risk/benefit analysis of those options, including safe use of medications and possible medication side effects. Through the use of shared decision making we have agreed to the above plan. The patient has received an after-visit summary and questions were answered concerning future plans and follow up. Advise pt of any urgent changes then to proceed to the ER.

## 2017-07-31 NOTE — MR AVS SNAPSHOT
Visit Information Date & Time Provider Department Dept. Phone Encounter #  
 7/31/2017 10:45 AM Jena Bullard MD [facility] [phone] [id number] Follow-up Instructions Return in about 6 months (around 1/17/2018) for physical.  
  
Upcoming Health Maintenance Date Due INFLUENZA AGE 9 TO ADULT 8/1/2017 BREAST CANCER SCRN MAMMOGRAM 1/23/2018 COLONOSCOPY 5/16/2018 PAP AKA CERVICAL CYTOLOGY 6/23/2020 DTaP/Tdap/Td series (2 - Td) 3/5/2024 Allergies as of 7/31/2017  Review Complete On: 7/31/2017 By: Raj Espinoza LPN No Known Allergies Current Immunizations  Reviewed on 7/31/2017 Name Date Influenza Vaccine Barbie ) 9/2/2015 Influenza Vaccine (Quad) PF 1/23/2017 Tdap 3/5/2014 Reviewed by Jena Bullard MD on 7/31/2017 at 11:37 AM  
You Were Diagnosed With   
  
 Codes Comments IGT (impaired glucose tolerance)    -  Primary ICD-10-CM: R73.02 
ICD-9-CM: 790.22 Vitals BP Pulse Temp Resp Height(growth percentile) Weight(growth percentile) 150/90 (BP 1 Location: Left arm, BP Patient Position: Sitting) 88 98.6 °F (37 °C) (Oral) 16 5' 4\" (1.626 m) 180 lb 3.2 oz (81.7 kg) LMP SpO2 BMI OB Status Smoking Status 07/02/2017 99% 30.93 kg/m2 Having regular periods Never Smoker BMI and BSA Data Body Mass Index Body Surface Area 30.93 kg/m 2 1.92 m 2 Your Updated Medication List  
  
   
This list is accurate as of: 7/31/17 12:21 PM.  Always use your most recent med list.  
  
  
  
  
 CALCIUM-VITAMIN D PO Take 1 Tab by mouth daily. cyanocobalamin 1,000 mcg tablet Take 1,000 mcg by mouth daily. ferrous sulfate 325 mg (65 mg iron) tablet Take 325 mg by mouth Daily (before breakfast). pantoprazole 40 mg tablet Commonly known as:  PROTONIX Take 1 Tab by mouth daily. PENTASA 500 mg CR capsule Generic drug:  mesalamine Take 500 mg by mouth three (3) times daily. PREVIDENT 5000 ENAMEL PROTECT 1.1-5 % Pste Generic drug:  sodium fluoride-pot nitrate APPLY TWO TIMES A DAY We Performed the Following AMB POC COMPLETE CBC, AUTOMATED [22303 CPT(R)] AMB POC HEMOGLOBIN A1C [40752 CPT(R)] LIPID PANEL [97932 CPT(R)] Follow-up Instructions Return in about 6 months (around 1/17/2018) for physical.  
  
  
Introducing [facility]! Abbey Lyons introduces [employer] patient portal. Now you can access parts of your medical record, email your doctor's office, and request medication refills online. 1. In your internet browser, go to [email or web address] 2. Click on the First Time User? Click Here link in the Sign In box. You will see the New Member Sign Up page. 3. Enter your [employer] Access Code exactly as it appears below. You will not need to use this code after youve completed the sign-up process. If you do not sign up before the expiration date, you must request a new code. · [employer] Access Code: [facility] SHARON Expires: 10/29/2017 12:21 PM 
 
4. Enter the last four digits of your Social Security Number (xxxx) and Date of Birth (mm/dd/yyyy) as indicated and click Submit. You will be taken to the next sign-up page. 5. Create a [employer] ID. This will be your [employer] login ID and cannot be changed, so think of one that is secure and easy to remember. 6. Create a [employer] password. You can change your password at any time. 7. Enter your Password Reset Question and Answer. This can be used at a later time if you forget your password. 8. Enter your e-mail address. You will receive e-mail notification when new information is available in [address] 9. Click Sign Up. You can now view and download portions of your medical record. 10. Click the Download Summary menu link to download a portable copy of your medical information. If you have questions, please visit the Frequently Asked Questions section of the [employer] website. Remember, [employer] is NOT to be used for urgent needs. For medical emergencies, dial 911. Now available from your iPhone and Android! Please provide this summary of care documentation to your next provider. Your primary care clinician is listed as Alvaro León. If you have any questions after today's visit, please call [phone].

## 2017-07-31 NOTE — PROGRESS NOTES
CMP 1/23/2017    Lipid 1/23/2017    Vit D 1/23/2017    A1C 1/23/2017      Mammogram 1/23/2017     Colonoscopy 5/16/2017 by Dr. Darrin Freeman and per patient needs to have another colonoscopy in 1 year.

## 2017-08-01 LAB
CHOLEST SERPL-MCNC: 186 MG/DL (ref 100–199)
HDLC SERPL-MCNC: 45 MG/DL
INTERPRETATION, 910389: NORMAL
LDLC SERPL CALC-MCNC: 121 MG/DL (ref 0–99)
TRIGL SERPL-MCNC: 102 MG/DL (ref 0–149)
VLDLC SERPL CALC-MCNC: 20 MG/DL (ref 5–40)

## 2017-08-02 LAB — 25(OH)D3+25(OH)D2 SERPL-MCNC: 20.4 NG/ML (ref 30–100)

## 2017-08-02 RX ORDER — ERGOCALCIFEROL 1.25 MG/1
50000 CAPSULE ORAL
Qty: 5 CAP | Refills: 6 | Status: SHIPPED | OUTPATIENT
Start: 2017-08-02 | End: 2018-01-31

## 2018-01-30 NOTE — PROGRESS NOTES
HISTORY OF PRESENT ILLNESS  Juan Pablo Isaacs is a 49 y.o. female. HPI   Follow up on BS and cholesterol and Vitamin D. Has noted that BP has been elevated. C/o dizzy spells over the past week. Feeling lightheaded for periods of time that last for several minutes and then subsides. Sx are bought on at times when she turns her head. No nausea or vomiting. No focal sx. No headache noted. No chest pain or SOB. .       Glucose intolerance reveiw:  She has IGT. Diabetic ROS - further diabetic ROS: no polyuria or polydipsia, no chest pain, dyspnea or TIA's, no numbness, tingling or pain in extremities, no unusual visual symptoms. Lab review: orders written for new lab studies as appropriate; see orders. Cardiovascular Review:  The patient has hyperlipidemia. Diet and Lifestyle: generally follows a low fat low cholesterol diet, generally follows a low sodium diet, exercises sporadically  Home BP Monitoring: is not measured at home. Pertinent ROS: no chest pain on exertion, no dyspnea on exertion, no swelling of ankles, no palpitations. Has hx of Crohns disease which overall has been stable with no recent sx. Has regular follow up with GI. Patient Active Problem List   Diagnosis Code    Hypovitaminosis D E55.9    Herpes genitalia A60.00    Anemia D64.9    IGT (impaired glucose tolerance) R73.02    Crohn's disease without complication ([facility] Z31.16    Encounter for medication monitoring Z51.81    Non morbid obesity E66.9       Current Outpatient Prescriptions   Medication Sig Dispense Refill    pantoprazole (PROTONIX) 40 mg tablet Take 1 Tab by mouth daily. 30 Tab 3    amLODIPine (NORVASC) 5 mg tablet Take 1 Tab by mouth daily. 30 Tab 3    meclizine (ANTIVERT) 12.5 mg tablet Take 1 Tab by mouth three (3) times daily as needed. 30 Tab 0    PENTASA 500 mg CR capsule Take 1,000 mg by mouth three (3) times daily.  CALCIUM CARB/VIT D3/MINERALS (CALCIUM-VITAMIN D PO) Take 1 Tab by mouth daily.       cyanocobalamin 1,000 mcg tablet Take 1,000 mcg by mouth daily.  ferrous sulfate 325 mg (65 mg iron) tablet Take 325 mg by mouth Daily (before breakfast).       PREVIDENT 5000 ENAMEL PROTECT 1.1-5 % pste APPLY TWO TIMES A DAY  3       No Known Allergies    Past Medical History:   Diagnosis Date    Crohn disease ([address]     5/2017    Environmental allergies     GERD (gastroesophageal reflux disease)     Herpes genitalia        Past Surgical History:   Procedure Laterality Date    HX GYN  1991    Laser- HPV       Family History   Problem Relation Age of Onset    Heart Disease Mother     Bleeding Prob Mother      was on coumadin    Stroke Father     Cancer Sister      lymphoma    Diabetes Brother     Schizophrenia Brother     Diabetes Sister     Diabetes Sister     Mental Retardation Sister     No Known Problems Sister     Other Brother      paralysis from car accident    Heart Disease Brother     Pacemaker Brother        Social History   Substance Use Topics    Smoking status: Never Smoker    Smokeless tobacco: Never Used    Alcohol use Yes      Comment: occasional        Lab Results  Component Value Date/Time   WBC 9.0 01/23/2017 03:12 PM   HGB 9.6 01/23/2017 03:12 PM   HCT 31.1 01/23/2017 03:12 PM   PLATELET 054 64/43/8735 03:12 PM   MCV 71 01/23/2017 03:12 PM     Lab Results  Component Value Date/Time   Cholesterol, total 180 01/31/2018 11:11 AM   HDL Cholesterol 44 01/31/2018 11:11 AM   LDL, calculated 117 01/31/2018 11:11 AM   Triglyceride 93 01/31/2018 11:11 AM     Lab Results  Component Value Date/Time   TSH 1.250 09/02/2015 10:21 AM      Lab Results   Component Value Date/Time    Sodium 139 01/31/2018 11:11 AM    Potassium 4.3 01/31/2018 11:11 AM    Chloride 99 01/31/2018 11:11 AM    CO2 23 01/31/2018 11:11 AM    Glucose 99 01/31/2018 11:11 AM    BUN 9 01/31/2018 11:11 AM    Creatinine 0.79 01/31/2018 11:11 AM    BUN/Creatinine ratio 11 01/31/2018 11:11 AM    GFR est  01/31/2018 11:11 AM    GFR est non-AA 89 01/31/2018 11:11 AM    Calcium 9.1 01/31/2018 11:11 AM    Bilirubin, total 0.2 01/31/2018 11:11 AM    ALT (SGPT) 12 01/31/2018 11:11 AM    AST (SGOT) 16 01/31/2018 11:11 AM    Alk. phosphatase 85 01/31/2018 11:11 AM    Protein, total 6.6 01/31/2018 11:11 AM    Albumin 4.0 01/31/2018 11:11 AM    A-G Ratio 1.5 01/31/2018 11:11 AM      Lab Results   Component Value Date/Time    Hemoglobin A1c 6.4 03/05/2014 12:58 PM    Hemoglobin A1c (POC) 5.8 01/31/2018 11:11 AM         Review of Systems   Constitutional: Negative for malaise/fatigue. HENT: Negative for congestion. Eyes: Negative for blurred vision. Respiratory: Negative for cough and shortness of breath. Cardiovascular: Negative for chest pain, palpitations and leg swelling. Gastrointestinal: Negative for abdominal pain, constipation and heartburn. Genitourinary: Negative for dysuria, frequency and urgency. Musculoskeletal: Negative for back pain and joint pain. Neurological: Positive for dizziness. Negative for tingling and headaches. Endo/Heme/Allergies: Negative for environmental allergies. Psychiatric/Behavioral: Negative for depression. The patient does not have insomnia. Physical Exam   Constitutional: She appears well-developed and well-nourished. BP (!) 152/92  Pulse 80  Temp 97.7 °F (36.5 °C) (Oral)   Resp 16  Ht 5' 4\" (1.626 m)  Wt 187 lb 12.8 oz (85.2 kg)  LMP 01/29/2018 (Exact Date)  SpO2 100%  BMI 32.24 kg/m2     HENT:   Right Ear: Tympanic membrane and ear canal normal.   Left Ear: Tympanic membrane and ear canal normal.   Nose: No mucosal edema or rhinorrhea. Mouth/Throat: Oropharynx is clear and moist and mucous membranes are normal.   Neck: Normal range of motion. Neck supple. No thyromegaly present. Cardiovascular: Normal rate and regular rhythm. No murmur heard. Pulmonary/Chest: Effort normal and breath sounds normal.   Abdominal: Soft.  Bowel sounds are normal. There is no tenderness. Musculoskeletal: Normal range of motion. She exhibits no edema. Lymphadenopathy:     She has no cervical adenopathy. Neurological: She has normal strength. No cranial nerve deficit or sensory deficit. Coordination and gait normal.   Skin: Skin is warm and dry. Psychiatric: She has a normal mood and affect. Nursing note and vitals reviewed. ASSESSMENT and PLAN  Diagnoses and all orders for this visit:    1. Elevated blood pressure reading without diagnosis of hypertension  -     LIPID PANEL  -     amLODIPine (NORVASC) 5 mg tablet; Take 1 Tab by mouth daily. Discussed sodium restriction, high k rich diet, maintaining ideal body weight and regular exercise program such as daily walking 30 min perday 4-5 times per week, as physiologic means to achieve blood pressure control.  Medication compliance advised. 2. Vertigo  -     meclizine (ANTIVERT) 12.5 mg tablet; Take 1 Tab by mouth three (3) times daily as needed. 3. [facility]'s disease without complication, unspecified gastrointestinal tract [facility] ([facility]  Stable     4. IGT (impaired glucose tolerance)  -     AMB POC HEMOGLOBIN A1C    5. Chronic anemia  -     AMB POC COMPLETE CBC, AUTOMATED    6. Gastroesophageal reflux disease without esophagitis  -     pantoprazole (PROTONIX) 40 mg tablet; Take 1 Tab by mouth daily. 7. Encounter for medication monitoring  -     METABOLIC PANEL, COMPREHENSIVE    8. Encounter for immunization  -     Influenza virus vaccine (QUADRIVALENT PRES FREE SYRINGE) IM (90385)  -     [place] IMMUNIZ ADMIN,1 SINGLE/COMB VAC/TOXOID    9. Encounter for screening mammogram for breast cancer  -     [facility] MAMMO BI SCREENING INCL CAD; Future    10. Non morbid obesity  Discussed weight loss options, diet and exercise. Also reviewed health issues related to obesity. Initial goal of weight loss 10% of current weight. Counseled on goal for exercise of eventual goal of 30-60 minutes 5-7 times a week as per AHA guidelines. Decrease carbohydrates (white foods, sweet foods, sweet drinks and alcohol), increase green leafy vegetables and protein (lean meats and beans). Avoid fried foods. Increase water intake. Eat 3-5 small meals daily. Increase physical activity. Follow-up Disposition:  Return in about 1 month (around 2/28/2018). reviewed diet, exercise and weight control  cardiovascular risk and specific lipid/LDL goals reviewed  reviewed medications and side effects in detail    I have discussed diagnosis listed in this note with pt and/or family. I have discussed treatment plans and options and the risk/benefit analysis of those options, including safe use of medications and possible medication side effects. Through the use of shared decision making we have agreed to the above plan. The patient has received an after-visit summary and questions were answered concerning future plans and follow up. Advise pt of any urgent changes then to proceed to the ER.

## 2018-01-31 ENCOUNTER — OFFICE VISIT (OUTPATIENT)
Dept: FAMILY MEDICINE CLINIC | Age: 48
End: 2018-01-31

## 2018-01-31 VITALS
BODY MASS INDEX: 32.06 KG/M2 | TEMPERATURE: 97.7 F | DIASTOLIC BLOOD PRESSURE: 92 MMHG | HEIGHT: 64 IN | SYSTOLIC BLOOD PRESSURE: 152 MMHG | HEART RATE: 80 BPM | OXYGEN SATURATION: 100 % | WEIGHT: 187.8 LBS | RESPIRATION RATE: 16 BRPM

## 2018-01-31 DIAGNOSIS — Z23 ENCOUNTER FOR IMMUNIZATION: ICD-10-CM

## 2018-01-31 DIAGNOSIS — Z51.81 ENCOUNTER FOR MEDICATION MONITORING: ICD-10-CM

## 2018-01-31 DIAGNOSIS — Z12.31 ENCOUNTER FOR SCREENING MAMMOGRAM FOR BREAST CANCER: ICD-10-CM

## 2018-01-31 DIAGNOSIS — E66.9 NON MORBID OBESITY: ICD-10-CM

## 2018-01-31 DIAGNOSIS — R73.02 IGT (IMPAIRED GLUCOSE TOLERANCE): ICD-10-CM

## 2018-01-31 DIAGNOSIS — R03.0 ELEVATED BLOOD PRESSURE READING WITHOUT DIAGNOSIS OF HYPERTENSION: Primary | ICD-10-CM

## 2018-01-31 DIAGNOSIS — K21.9 GASTROESOPHAGEAL REFLUX DISEASE WITHOUT ESOPHAGITIS: ICD-10-CM

## 2018-01-31 DIAGNOSIS — K50.90 CROHN'S DISEASE WITHOUT COMPLICATION, UNSPECIFIED GASTROINTESTINAL TRACT LOCATION (HCC): ICD-10-CM

## 2018-01-31 DIAGNOSIS — D64.9 CHRONIC ANEMIA: ICD-10-CM

## 2018-01-31 DIAGNOSIS — R42 VERTIGO: ICD-10-CM

## 2018-01-31 LAB — HBA1C MFR BLD HPLC: 5.8 %

## 2018-01-31 RX ORDER — MECLIZINE HCL 12.5 MG 12.5 MG/1
12.5 TABLET ORAL
Qty: 30 TAB | Refills: 0 | Status: SHIPPED | OUTPATIENT
Start: 2018-01-31 | End: 2020-04-28 | Stop reason: ALTCHOICE

## 2018-01-31 RX ORDER — PANTOPRAZOLE SODIUM 40 MG/1
40 TABLET, DELAYED RELEASE ORAL DAILY
Qty: 30 TAB | Refills: 3 | Status: SHIPPED | OUTPATIENT
Start: 2018-01-31 | End: 2018-05-18

## 2018-01-31 RX ORDER — AMLODIPINE BESYLATE 5 MG/1
5 TABLET ORAL DAILY
Qty: 30 TAB | Refills: 3 | Status: SHIPPED | OUTPATIENT
Start: 2018-01-31 | End: 2018-02-28 | Stop reason: SDUPTHER

## 2018-01-31 NOTE — PATIENT INSTRUCTIONS
Vaccine Information Statement    Influenza (Flu) Vaccine (Inactivated or Recombinant): What you need to know    Many Vaccine Information Statements are available in [place] and other languages. See www.immunize.org/vis  Hojas de Información Sobre Vacunas están disponibles en Español y en muchos otros idiomas. Visite www.immunize.org/vis    1. Why get vaccinated? Influenza (flu) is a contagious disease that spreads around the [place] every year, usually between October and May. Flu is caused by influenza viruses, and is spread mainly by coughing, sneezing, and close contact. Anyone can get flu. Flu strikes suddenly and can last several days. Symptoms vary by age, but can include:   fever/chills   sore throat   muscle aches   fatigue   cough   headache    runny or stuffy nose    Flu can also lead to pneumonia and blood infections, and cause diarrhea and seizures in children. If you have a medical condition, such as heart or lung disease, flu can make it worse. Flu is more dangerous for some people. Infants and young children, people 72years of age and older, pregnant women, and people with certain health conditions or a weakened immune system are at greatest risk. Each year thousands of people in the [facility] die from flu, and many more are hospitalized. Flu vaccine can:   keep you from getting flu,   make flu less severe if you do get it, and   keep you from spreading flu to your family and other people. 2. Inactivated and recombinant flu vaccines    A dose of flu vaccine is recommended every flu season. Children 6 months through 6years of age may need two doses during the same flu season. Everyone else needs only one dose each flu season.        Some inactivated flu vaccines contain a very small amount of a mercury-based preservative called thimerosal. Studies have not shown thimerosal in vaccines to be harmful, but flu vaccines that do not contain thimerosal are available. There is no live flu virus in flu shots. They cannot cause the flu. There are many flu viruses, and they are always changing. Each year a new flu vaccine is made to protect against three or four viruses that are likely to cause disease in the upcoming flu season. But even when the vaccine doesnt exactly match these viruses, it may still provide some protection    Flu vaccine cannot prevent:   flu that is caused by a virus not covered by the vaccine, or   illnesses that look like flu but are not. It takes about 2 weeks for protection to develop after vaccination, and protection lasts through the flu season. 3. Some people should not get this vaccine    Tell the person who is giving you the vaccine:     If you have any severe, life-threatening allergies. If you ever had a life-threatening allergic reaction after a dose of flu vaccine, or have a severe allergy to any part of this vaccine, you may be advised not to get vaccinated. Most, but not all, types of flu vaccine contain a small amount of egg protein.  If you ever had Guillain-Barré Syndrome (also called GBS). Some people with a history of GBS should not get this vaccine. This should be discussed with your doctor.  If you are not feeling well. It is usually okay to get flu vaccine when you have a mild illness, but you might be asked to come back when you feel better. 4. Risks of a vaccine reaction    With any medicine, including vaccines, there is a chance of reactions. These are usually mild and go away on their own, but serious reactions are also possible. Most people who get a flu shot do not have any problems with it.      Minor problems following a flu shot include:    soreness, redness, or swelling where the shot was given     hoarseness   sore, red or itchy eyes   cough   fever   aches   headache   itching   fatigue  If these problems occur, they usually begin soon after the shot and last 1 or 2 days. More serious problems following a flu shot can include the following:     There may be a small increased risk of Guillain-Barré Syndrome (GBS) after inactivated flu vaccine. This risk has been estimated at 1 or 2 additional cases per million people vaccinated. This is much lower than the risk of severe complications from flu, which can be prevented by flu vaccine.  Young children who get the flu shot along with pneumococcal vaccine (PCV13) and/or DTaP vaccine at the same time might be slightly more likely to have a seizure caused by fever. Ask your doctor for more information. Tell your doctor if a child who is getting flu vaccine has ever had a seizure. Problems that could happen after any injected vaccine:      People sometimes faint after a medical procedure, including vaccination. Sitting or lying down for about 15 minutes can help prevent fainting, and injuries caused by a fall. Tell your doctor if you feel dizzy, or have vision changes or ringing in the ears.  Some people get severe pain in the shoulder and have difficulty moving the arm where a shot was given. This happens very rarely.  Any medication can cause a severe allergic reaction. Such reactions from a vaccine are very rare, estimated at about 1 in a million doses, and would happen within a few minutes to a few hours after the vaccination. As with any medicine, there is a very remote chance of a vaccine causing a serious injury or death. The safety of vaccines is always being monitored. For more information, visit: www.cdc.gov/vaccinesafety/    5. What if there is a serious reaction? What should I look for?  Look for anything that concerns you, such as signs of a severe allergic reaction, very high fever, or unusual behavior.     Signs of a severe allergic reaction can include hives, swelling of the face and throat, difficulty breathing, a fast heartbeat, dizziness, and weakness  usually within a few minutes to a few hours after the vaccination. What should I do?  If you think it is a severe allergic reaction or other emergency that cant wait, call 9-1-1 and get the person to the nearest hospital. Otherwise, call your doctor.  Reactions should be reported to the Vaccine Adverse Event Reporting System (VAERS). Your doctor should file this report, or you can do it yourself through  the VAERS web site at www.vaers. [facility].gov, or by calling 6-484.367.2991. VAERS does not give medical advice. 6. The National Vaccine Injury Compensation Program    The [facility] Vaccine Injury Compensation Program (VICP) is a federal program that was created to compensate people who may have been injured by certain vaccines. Persons who believe they may have been injured by a vaccine can learn about the program and about filing a claim by calling 5-102.831.2007 or visiting the [employer] website at www.[facility].gov/vaccinecompensation. There is a time limit to file a claim for compensation. 7. How can I learn more?  Ask your healthcare provider. He or she can give you the vaccine package insert or suggest other sources of information.  Call your local or state health department.  Contact the Centers for Disease Control and Prevention (CDC):  - Call 8-280.970.7091 (1-800-CDC-INFO) or  - Visit CDCs website at www.cdc.gov/flu    Vaccine Information Statement   Inactivated Influenza Vaccine   8/7/2015  41 JORGE Carvalho [id number]    Department of Health and Human Services  Centers for Disease Control and Prevention    Office Use Only       Vertigo: Care Instructions  Your Care Instructions    Vertigo is the feeling that you or your surroundings are moving when there is no actual movement. It is often described as a feeling of spinning, whirling, falling, or tilting. Vertigo may make you vomit or feel nauseated. You may have trouble standing or walking and may lose your balance.   Vertigo is often related to an inner ear problem, but it can have other more serious causes. If vertigo continues, you may need more tests to find its cause. Follow-up care is a key part of your treatment and safety. Be sure to make and go to all appointments, and call your doctor if you are having problems. It's also a good idea to know your test results and keep a list of the medicines you take. How can you care for yourself at home? · Do not lie flat on your back. Prop yourself up slightly. This may reduce the spinning feeling. Keep your eyes open. · Move slowly so that you do not fall. · If your doctor recommends medicine, take it exactly as directed. · Do not drive while you are having vertigo. Certain exercises, called Waldron-Daroff exercises, can help decrease vertigo. To do Waldron-Daroff exercises:  · Sit on the edge of a bed or sofa and quickly lie down on the side that causes the worst vertigo. Lie on your side with your ear down. · Stay in this position for at least 30 seconds or until the vertigo goes away. · Sit up. If this causes vertigo, wait for it to stop. · Repeat the procedure on the other side. · Repeat this 10 times. Do these exercises 2 times a day until the vertigo is gone. When should you call for help? Call 911 anytime you think you may need emergency care. For example, call if:  ? · You passed out (lost consciousness). ? · You have symptoms of a stroke. These may include:  ¨ Sudden numbness, tingling, weakness, or loss of movement in your face, arm, or leg, especially on only one side of your body. ¨ Sudden vision changes. ¨ Sudden trouble speaking. ¨ Sudden confusion or trouble understanding simple statements. ¨ Sudden problems with walking or balance. ¨ A sudden, severe headache that is different from past headaches. ?Call your doctor now or seek immediate medical care if:  ? · Vertigo occurs with a fever, a headache, or ringing in your ears. ? · You have new or increased nausea and vomiting. ? Watch closely for changes in your health, and be sure to contact your doctor if:  ? · Vertigo gets worse or happens more often. ? · Vertigo has not gotten better after 2 weeks. Where can you learn more? Go to http://jcarlos-breanna.info/. Enter U943 in the search box to learn more about \"Vertigo: Care Instructions. \"  Current as of: May 12, 2017  Content Version: 11.4  © 5707-6045 [employer]. Care instructions adapted under license by [employer] (which disclaims liability or warranty for this information). If you have questions about a medical condition or this instruction, always ask your healthcare professional. [place] [address] any warranty or liability for your use of this information.

## 2018-01-31 NOTE — MR AVS SNAPSHOT
[facility] 
 
 
 [address] [facility] [address] 
[phone] Patient: Cecil Montiel MRN: [id number] :1970 Visit Information Date & Time Provider Department Dept. Phone Encounter #  
 2018 10:00 AM Gerber Rios [phone] [id number] Follow-up Instructions Return in about 1 month (around 2018). Upcoming Health Maintenance Date Due Influenza Age 5 to Adult 2017 BREAST CANCER SCRN MAMMOGRAM 2018 COLONOSCOPY 2018 PAP AKA CERVICAL CYTOLOGY 2020 DTaP/Tdap/Td series (2 - Td) 3/5/2024 Allergies as of 2018  Review Complete On: 2018 By: Brandi Bean MD  
 No Known Allergies Current Immunizations  Reviewed on 2018 Name Date Influenza Vaccine Ashley Nose) 2015 Influenza Vaccine (Quad) PF 2018, 2017 Tdap 3/5/2014 Reviewed by Diamond Guajardo LPN on 3427 at 43:19 AM  
 Reviewed by Brandi Bean MD on 2018 at 10:53 AM  
You Were Diagnosed With   
  
 Codes Comments Elevated blood pressure reading without diagnosis of hypertension    -  Primary ICD-10-CM: R03.0 ICD-9-CM: 796.2 Vertigo     ICD-10-CM: B18 ICD-9-CM: 780.4 Crohn's disease without complication, unspecified gastrointestinal tract location [facility])     ICD-10-CM: K50.90 ICD-9-CM: 555.9 IGT (impaired glucose tolerance)     ICD-10-CM: R73.02 
ICD-9-CM: 790.22 Chronic anemia     ICD-10-CM: D64.9 ICD-9-CM: 285.9 Gastroesophageal reflux disease without esophagitis     ICD-10-CM: K21.9 ICD-9-CM: 530.81 Encounter for immunization     ICD-10-CM: R87 ICD-9-CM: V03.89 Encounter for medication monitoring     ICD-10-CM: Z51.81 
ICD-9-CM: V58.83 Encounter for screening mammogram for breast cancer     ICD-10-CM: Z12.31 
ICD-9-CM: V76.12 Vitals BP Pulse Temp Resp Height(growth percentile) Weight(growth percentile) (!) 152/92 80 97.7 °F (36.5 °C) (Oral) 16 5' 4\" (1.626 m) 187 lb 12.8 oz (85.2 kg) LMP SpO2 BMI OB Status Smoking Status 01/29/2018 (Exact Date) 100% 32.24 kg/m2 Having regular periods Never Smoker Vitals History BMI and BSA Data Body Mass Index Body Surface Area  
 32.24 kg/m 2 1.96 m 2 Preferred Pharmacy Pharmacy Name Phone [facility] [address], [address] [place] [address] [phone] Your Updated Medication List  
  
   
This list is accurate as of: 1/31/18 11:29 AM.  Always use your most recent med list. amLODIPine 5 mg tablet Commonly known as:  [place] Glatter Take 1 Tab by mouth daily. CALCIUM-VITAMIN D PO Take 1 Tab by mouth daily. cyanocobalamin 1,000 mcg tablet Take 1,000 mcg by mouth daily. ferrous sulfate 325 mg (65 mg iron) tablet Take 325 mg by mouth Daily (before breakfast). meclizine 12.5 mg tablet Commonly known as:  ANTIVERT Take 1 Tab by mouth three (3) times daily as needed. pantoprazole 40 mg tablet Commonly known as:  PROTONIX Take 1 Tab by mouth daily. PENTASA 500 mg CR capsule Generic drug:  mesalamine Take 1,000 mg by mouth three (3) times daily. PREVIDENT 5000 ENAMEL PROTECT 1.1-5 % Pste Generic drug:  sodium fluoride-pot nitrate APPLY TWO TIMES A DAY Prescriptions Sent to Pharmacy Refills  
 pantoprazole (PROTONIX) 40 mg tablet 3 Sig: Take 1 Tab by mouth daily. Class: Normal  
 [facility] [address] [address] [facility] [phone] Route: Oral  
 amLODIPine (NORVASC) 5 mg tablet 3 Sig: Take 1 Tab by mouth daily. Class: Normal  
 Pharmacy: [facility] [address], [address] [facility] Ph #: [phone]  Route: Oral  
 meclizine (ANTIVERT) 12.5 mg tablet 0  
 Sig: Take 1 Tab by mouth three (3) times daily as needed. Class: Normal  
 Pharmacy: [facility], [address]  #: [phone] Route: Oral  
  
We Performed the Following AMB POC COMPLETE CBC, AUTOMATED [82436 CPT(R)] AMB POC HEMOGLOBIN A1C [52528 CPT(R)] INFLUENZA VIRUS VAC QUAD,SPLIT,PRESV FREE SYRINGE IM [id number] CPT(R)] LIPID PANEL [27292 CPT(R)] METABOLIC PANEL, COMPREHENSIVE [37214 CPT(R)] MN IMMUNIZ ADMIN,1 SINGLE/COMB VAC/TOXOID [id number] CPT(R)] Follow-up Instructions Return in about 1 month (around 2/28/2018). To-Do List   
 01/31/2018 Imaging:  [facility] MAMMO BI SCREENING INCL CAD Patient Instructions Vaccine Information Statement Influenza (Flu) Vaccine (Inactivated or Recombinant): What you need to know Many Vaccine Information Statements are available in Japanese and other languages. See www.immunize.org/vis Hojas de Información Sobre Vacunas están disponibles en Español y en muchos otros idiomas. Visite www.immunize.org/vis 1. Why get vaccinated? Influenza (flu) is a contagious disease that spreads around the [place] every year, usually between October and May. Flu is caused by influenza viruses, and is spread mainly by coughing, sneezing, and close contact. Anyone can get flu. Flu strikes suddenly and can last several days. Symptoms vary by age, but can include: 
 fever/chills  sore throat  muscle aches  fatigue  cough  headache  runny or stuffy nose Flu can also lead to pneumonia and blood infections, and cause diarrhea and seizures in children. If you have a medical condition, such as heart or lung disease, flu can make it worse. Flu is more dangerous for some people. Infants and young children, people 72years of age and older, pregnant women, and people with certain health conditions or a weakened immune system are at greatest risk. Each year thousands of people in the [facility] die from flu, and many more are hospitalized. Flu vaccine can: 
 keep you from getting flu, 
 make flu less severe if you do get it, and 
 keep you from spreading flu to your family and other people. 2. Inactivated and recombinant flu vaccines A dose of flu vaccine is recommended every flu season. Children 6 months through 6years of age may need two doses during the same flu season. Everyone else needs only one dose each flu season. Some inactivated flu vaccines contain a very small amount of a mercury-based preservative called thimerosal. Studies have not shown thimerosal in vaccines to be harmful, but flu vaccines that do not contain thimerosal are available. There is no live flu virus in flu shots. They cannot cause the flu. There are many flu viruses, and they are always changing. Each year a new flu vaccine is made to protect against three or four viruses that are likely to cause disease in the upcoming flu season. But even when the vaccine doesnt exactly match these viruses, it may still provide some protection Flu vaccine cannot prevent: 
 flu that is caused by a virus not covered by the vaccine, or 
 illnesses that look like flu but are not. It takes about 2 weeks for protection to develop after vaccination, and protection lasts through the flu season. 3. Some people should not get this vaccine Tell the person who is giving you the vaccine:  If you have any severe, life-threatening allergies. If you ever had a life-threatening allergic reaction after a dose of flu vaccine, or have a severe allergy to any part of this vaccine, you may be advised not to get vaccinated. Most, but not all, types of flu vaccine contain a small amount of egg protein.  If you ever had Guillain-Barré Syndrome (also called GBS). Some people with a history of GBS should not get this vaccine.  This should be discussed with your doctor.  If you are not feeling well. It is usually okay to get flu vaccine when you have a mild illness, but you might be asked to come back when you feel better. 4. Risks of a vaccine reaction With any medicine, including vaccines, there is a chance of reactions. These are usually mild and go away on their own, but serious reactions are also possible. Most people who get a flu shot do not have any problems with it. Minor problems following a flu shot include:  
 soreness, redness, or swelling where the shot was given  hoarseness  sore, red or itchy eyes  cough  fever  aches  headache  itching  fatigue If these problems occur, they usually begin soon after the shot and last 1 or 2 days. More serious problems following a flu shot can include the following:  There may be a small increased risk of Guillain-Barré Syndrome (GBS) after inactivated flu vaccine. This risk has been estimated at 1 or 2 additional cases per million people vaccinated. This is much lower than the risk of severe complications from flu, which can be prevented by flu vaccine.  Young children who get the flu shot along with pneumococcal vaccine (PCV13) and/or DTaP vaccine at the same time might be slightly more likely to have a seizure caused by fever. Ask your doctor for more information. Tell your doctor if a child who is getting flu vaccine has ever had a seizure. Problems that could happen after any injected vaccine:  People sometimes faint after a medical procedure, including vaccination. Sitting or lying down for about 15 minutes can help prevent fainting, and injuries caused by a fall. Tell your doctor if you feel dizzy, or have vision changes or ringing in the ears.  Some people get severe pain in the shoulder and have difficulty moving the arm where a shot was given. This happens very rarely.  Any medication can cause a severe allergic reaction. Such reactions from a vaccine are very rare, estimated at about 1 in a million doses, and would happen within a few minutes to a few hours after the vaccination. As with any medicine, there is a very remote chance of a vaccine causing a serious injury or death. The safety of vaccines is always being monitored. For more information, visit: www.cdc.gov/vaccinesafety/ 
 
5. What if there is a serious reaction? What should I look for?  Look for anything that concerns you, such as signs of a severe allergic reaction, very high fever, or unusual behavior. Signs of a severe allergic reaction can include hives, swelling of the face and throat, difficulty breathing, a fast heartbeat, dizziness, and weakness  usually within a few minutes to a few hours after the vaccination. What should I do?  If you think it is a severe allergic reaction or other emergency that cant wait, call 9-1-1 and get the person to the nearest hospital. Otherwise, call your doctor.  Reactions should be reported to the Vaccine Adverse Event Reporting System (VAERS). Your doctor should file this report, or you can do it yourself through  the VAERS web site at www.vaers. [employer] [facility].gov, or by calling 1-782.485.9080. [employer] does not give medical advice. 6. The National Vaccine Injury Compensation Program 
 
The [facility] Vaccine Injury Compensation Program (VICP) is a federal program that was created to compensate people who may have been injured by certain vaccines. Persons who believe they may have been injured by a vaccine can learn about the program and about filing a claim by calling 8-733.892.8405 or visiting the [employer] website at www.[place].gov/vaccinecompensation. There is a time limit to file a claim for compensation. 7. How can I learn more?  Ask your healthcare provider. He or she can give you the vaccine package insert or suggest other sources of information.  Call your local or state health department.  Contact the Centers for Disease Control and Prevention (CDC): 
- Call 3-701.393.4943 (1-800-CDC-INFO) or 
- Visit CDCs website at www.cdc.gov/flu Vaccine Information Statement Inactivated Influenza Vaccine 8/7/2015 
42 JORGE Sosa [id number] Department of [facility] and [employer] Centers for Disease Control and Prevention Office Use Only Vertigo: Care Instructions Your Care Instructions Vertigo is the feeling that you or your surroundings are moving when there is no actual movement. It is often described as a feeling of spinning, whirling, falling, or tilting. Vertigo may make you vomit or feel nauseated. You may have trouble standing or walking and may lose your balance. Vertigo is often related to an inner ear problem, but it can have other more serious causes. If vertigo continues, you may need more tests to find its cause. Follow-up care is a key part of your treatment and safety. Be sure to make and go to all appointments, and call your doctor if you are having problems. It's also a good idea to know your test results and keep a list of the medicines you take. How can you care for yourself at home? · Do not lie flat on your back. Prop yourself up slightly. This may reduce the spinning feeling. Keep your eyes open. · Move slowly so that you do not fall. · If your doctor recommends medicine, take it exactly as directed. · Do not drive while you are having vertigo. Certain exercises, called Waldron-Daroff exercises, can help decrease vertigo. To do Waldron-Daroff exercises: · Sit on the edge of a bed or sofa and quickly lie down on the side that causes the worst vertigo. Lie on your side with your ear down. · Stay in this position for at least 30 seconds or until the vertigo goes away. · Sit up. If this causes vertigo, wait for it to stop. · Repeat the procedure on the other side. · Repeat this 10 times. Do these exercises 2 times a day until the vertigo is gone. When should you call for help? Call 911 anytime you think you may need emergency care. For example, call if: 
? · You passed out (lost consciousness). ? · You have symptoms of a stroke. These may include: 
¨ Sudden numbness, tingling, weakness, or loss of movement in your face, arm, or leg, especially on only one side of your body. ¨ Sudden vision changes. ¨ Sudden trouble speaking. ¨ Sudden confusion or trouble understanding simple statements. ¨ Sudden problems with walking or balance. ¨ A sudden, severe headache that is different from past headaches. ?Call your doctor now or seek immediate medical care if: 
? · Vertigo occurs with a fever, a headache, or ringing in your ears. ? · You have new or increased nausea and vomiting. ? Watch closely for changes in your health, and be sure to contact your doctor if: 
? · Vertigo gets worse or happens more often. ? · Vertigo has not gotten better after 2 weeks. Where can you learn more? Go to http://jcarlos-breanna.info/. Enter W923 in the search box to learn more about \"Vertigo: Care Instructions. \" Current as of: May 12, 2017 Content Version: 11.4 © 2906-0933 [employer]wise[employer]. Care instructions adapted under license by "[employer] (which disclaims liability or warranty for this information). If you have questions about a medical condition or this instruction, always ask your healthcare professional. [place] [address] any warranty or liability for your use of this information. Introducing [facility]! [employer] introduces [employer] patient portal. Now you can access parts of your medical record, email your doctor's office, and request medication refills online. 1. In your internet browser, go to [email or web address]://[email or web address]. [employer] 2. Click on the First Time User? Click Here link in the Sign In box. You will see the New Member Sign Up page. 3. Enter your [employer] Access Code exactly as it appears below. You will not need to use this code after youve completed the sign-up process. If you do not sign up before the expiration date, you must request a new code. · [employer] Access Code: [email or web address] Expires: 5/1/2018 11:28 AM 
 
4. Enter the last four digits of your Social Security Number (xxxx) and Date of Birth (mm/dd/yyyy) as indicated and click Submit. You will be taken to the next sign-up page. 5. Create a [employer] ID. This will be your [employer] login ID and cannot be changed, so think of one that is secure and easy to remember. 6. Create a [employer] password. You can change your password at any time. 7. Enter your Password Reset Question and Answer. This can be used at a later time if you forget your password. 8. Enter your e-mail address. You will receive e-mail notification when new information is available in [address] 9. Click Sign Up. You can now view and download portions of your medical record. 10. Click the Download Summary menu link to download a portable copy of your medical information. If you have questions, please visit the Frequently Asked Questions section of the [employer] website. Remember, [employer] is NOT to be used for urgent needs. For medical emergencies, dial 911. Now available from your iPhone and Android! Please provide this summary of care documentation to your next provider. Your primary care clinician is listed as Rigoberto Borges. If you have any questions after today's visit, please call [phone].

## 2018-02-01 LAB
ALBUMIN SERPL-MCNC: 4 G/DL (ref 3.5–5.5)
ALBUMIN/GLOB SERPL: 1.5 {RATIO} (ref 1.2–2.2)
ALP SERPL-CCNC: 85 IU/L (ref 39–117)
ALT SERPL-CCNC: 12 IU/L (ref 0–32)
AST SERPL-CCNC: 16 IU/L (ref 0–40)
BILIRUB SERPL-MCNC: 0.2 MG/DL (ref 0–1.2)
BUN SERPL-MCNC: 9 MG/DL (ref 6–24)
BUN/CREAT SERPL: 11 (ref 9–23)
CALCIUM SERPL-MCNC: 9.1 MG/DL (ref 8.7–10.2)
CHLORIDE SERPL-SCNC: 99 MMOL/L (ref 96–106)
CHOLEST SERPL-MCNC: 180 MG/DL (ref 100–199)
CO2 SERPL-SCNC: 23 MMOL/L (ref 18–29)
CREAT SERPL-MCNC: 0.79 MG/DL (ref 0.57–1)
GFR SERPLBLD CREATININE-BSD FMLA CKD-EPI: 102 ML/MIN/1.73
GFR SERPLBLD CREATININE-BSD FMLA CKD-EPI: 89 ML/MIN/1.73
GLOBULIN SER CALC-MCNC: 2.6 G/DL (ref 1.5–4.5)
GLUCOSE SERPL-MCNC: 99 MG/DL (ref 65–99)
HDLC SERPL-MCNC: 44 MG/DL
INTERPRETATION, 910389: NORMAL
LDLC SERPL CALC-MCNC: 117 MG/DL (ref 0–99)
POTASSIUM SERPL-SCNC: 4.3 MMOL/L (ref 3.5–5.2)
PROT SERPL-MCNC: 6.6 G/DL (ref 6–8.5)
SODIUM SERPL-SCNC: 139 MMOL/L (ref 134–144)
TRIGL SERPL-MCNC: 93 MG/DL (ref 0–149)
VLDLC SERPL CALC-MCNC: 19 MG/DL (ref 5–40)

## 2018-02-05 PROBLEM — E66.9 NON MORBID OBESITY: Status: ACTIVE | Noted: 2018-02-05

## 2018-02-05 PROBLEM — Z51.81 ENCOUNTER FOR MEDICATION MONITORING: Status: ACTIVE | Noted: 2018-02-05

## 2018-02-28 ENCOUNTER — HOSPITAL ENCOUNTER (OUTPATIENT)
Dept: MAMMOGRAPHY | Age: 48
Discharge: HOME | End: 2018-02-28
Attending: FAMILY MEDICINE
Payer: COMMERCIAL

## 2018-02-28 ENCOUNTER — HOSPITAL ENCOUNTER (OUTPATIENT)
Dept: ULTRASOUND IMAGING | Age: 48
Discharge: HOME | End: 2018-02-28
Attending: FAMILY MEDICINE
Payer: COMMERCIAL

## 2018-02-28 ENCOUNTER — OFFICE VISIT (OUTPATIENT)
Dept: FAMILY MEDICINE CLINIC | Age: 48
End: 2018-02-28

## 2018-02-28 VITALS
OXYGEN SATURATION: 98 % | RESPIRATION RATE: 16 BRPM | WEIGHT: 184.6 LBS | TEMPERATURE: 97.9 F | HEIGHT: 64 IN | SYSTOLIC BLOOD PRESSURE: 140 MMHG | HEART RATE: 78 BPM | BODY MASS INDEX: 31.51 KG/M2 | DIASTOLIC BLOOD PRESSURE: 84 MMHG

## 2018-02-28 DIAGNOSIS — M23.8X2 JOINT LAXITY OF LEFT KNEE: ICD-10-CM

## 2018-02-28 DIAGNOSIS — I10 ESSENTIAL HYPERTENSION: Primary | ICD-10-CM

## 2018-02-28 DIAGNOSIS — K50.90 CROHN'S DISEASE WITHOUT COMPLICATION, UNSPECIFIED GASTROINTESTINAL TRACT LOCATION (HCC): ICD-10-CM

## 2018-02-28 DIAGNOSIS — M77.01 MEDIAL EPICONDYLITIS OF RIGHT ELBOW: ICD-10-CM

## 2018-02-28 DIAGNOSIS — N63.0 BREAST LUMP: ICD-10-CM

## 2018-02-28 DIAGNOSIS — Z51.81 ENCOUNTER FOR MEDICATION MONITORING: ICD-10-CM

## 2018-02-28 DIAGNOSIS — Z12.31 ENCOUNTER FOR SCREENING MAMMOGRAM FOR BREAST CANCER: ICD-10-CM

## 2018-02-28 DIAGNOSIS — N63.10 LUMP OF BREAST, RIGHT: ICD-10-CM

## 2018-02-28 PROCEDURE — 76642 ULTRASOUND BREAST LIMITED: CPT

## 2018-02-28 PROCEDURE — 77066 DX MAMMO INCL CAD BI: CPT

## 2018-02-28 RX ORDER — AMLODIPINE BESYLATE 10 MG/1
10 TABLET ORAL DAILY
Qty: 30 TAB | Refills: 3 | Status: SHIPPED | OUTPATIENT
Start: 2018-02-28 | End: 2018-08-01 | Stop reason: SDUPTHER

## 2018-02-28 RX ORDER — ADALIMUMAB 40MG/0.8ML
40 KIT SUBCUTANEOUS
COMMUNITY
Start: 2018-02-09 | End: 2018-10-03 | Stop reason: SDUPTHER

## 2018-02-28 RX ORDER — AMLODIPINE BESYLATE 5 MG/1
10 TABLET ORAL DAILY
Qty: 60 TAB | Refills: 3 | Status: SHIPPED | OUTPATIENT
Start: 2018-02-28 | End: 2018-04-30 | Stop reason: SDUPTHER

## 2018-02-28 NOTE — PATIENT INSTRUCTIONS
Golfer's Elbow: Care Instructions  Your Care Instructions  The pain and soreness in the inner part of your elbow is caused by a problem called golfer's elbow. Bending the wrist over and over again has hurt the tendons that attach to your inner elbow. The muscles in your forearm also may hurt. Golfer's elbow usually gets better with treatment at home. Follow-up care is a key part of your treatment and safety. Be sure to make and go to all appointments, and call your doctor if you are having problems. It's also a good idea to know your test results and keep a list of the medicines you take. How can you care for yourself at home? · Rest your elbow and wrist. Try to avoid movements that are painful. You may have to do this for weeks to months. Follow your doctor's directions for how long to rest.  · Put ice or a cold pack on your elbow for 10 to 20 minutes at a time. Try to do this every 1 to 2 hours for the next 3 days (when you are awake) or until the swelling goes down. Put a thin cloth between the ice and your skin. · Prop up the sore arm on a pillow when you ice it or anytime you sit or lie down during the next 3 days. Try to keep it above the level of your heart. This will help reduce swelling. · Take pain medicine exactly as directed. ¨ If the doctor gave you a prescription medicine for pain, take it as prescribed. ¨ If you are not taking a prescription pain medicine, ask your doctor if you can take an over-the-counter medicine. · If your doctor gaveyou a brace or splint, use it as directed. A \"counterforce\" brace is a strap around the forearm, justbelow the elbow. It may ease the pressure on the tendon and may spread force throughout thearm. · Follow your doctor's or physical therapist's directions for exercise. To prevent golfer's elbow  · After your elbow has healed, learn the best techniques for your work or sport. A physical or occupational therapist can help you.   When should you call for help?  Call your doctor now or seek immediate medical care if:  ? · Your pain gets worse. ? · You cannot bend your elbow normally. ? · You have tingling, weakness, or numbness in your hand and fingers. ? · Your arm or hand is cool or pale or changes color. ? Watch closely for changes in your health, and be sure to contact your doctor if:  ? · You have work problems caused by your elbow pain. ? · Your pain is not better after 2 weeks. Where can you learn more? Go to http://jcarlos-breanna.info/. Enter N282 in the search box to learn more about \"Golfer's Elbow: Care Instructions. \"  Current as of: March 21, 2017  Content Version: 11.4  © 5241-4556 [employer]. Care instructions adapted under license by [employer] (which disclaims liability or warranty for this information). If you have questions about a medical condition or this instruction, always ask your healthcare professional. [place] [address] any warranty or liability for your use of this information. Golfer's Elbow: Exercises  Your Care Instructions  Here are some examples of typical rehabilitation exercises for your condition. Start each exercise slowly. Ease off the exercise if you start to have pain. Your doctor or physical therapist will tell you when you can start these exercises and which ones will work best for you. How to do the exercises  Wrist extensor stretch    1. Extend your affected arm in front of you and make a fist with your palm facing down. 2. Bend your wrist so that your fist points toward the floor. 3. With your other hand, gently bend your wrist farther until you feel a mild to moderate stretch in your forearm. 4. Hold for at least 15 to 30 seconds. 5. Repeat 2 to 4 times. 6. Repeat steps 1 through 5 with your fingers pointing toward the floor. Forearm extensor stretch    1. Place your affected elbow down at your side, bent at about 90 degrees.  Then make a fist with your palm facing down. 2. Keeping your wrist bent, slowly straighten your elbow so your arm is down at your side. Then twist your fist out so your palm is facing out to the side and you feel a stretch. 3. Hold for at least 15 to 30 seconds. 4. Repeat 2 to 4 times. Wrist flexor stretch    1. Extend your affected arm in front of you with your palm facing away from your body. 2. Bend back your wrist, pointing your hand up toward the ceiling. 3. With your other hand, gently bend your wrist farther until you feel a mild to moderate stretch in your forearm. 4. Hold for at least 15 to 30 seconds. 5. Repeat 2 to 4 times. 6. Repeat steps 1 through 5, but this time extend your affected arm in front of you with your palm facing up. Then bend back your wrist, pointing your hand toward the floor. Wrist curls    1. Place your forearm on a table with your hand hanging over the edge of the table, palm up. 2. Place a 1- to 2-pound weight in your hand. This may be a dumbbell, a can of food, or a filled water bottle. 3. Slowly raise and lower the weight while keeping your forearm on the table and your palm facing up. 4. Repeat this motion 8 to 12 times. 5. Switch arms, and do steps 1 through 4.  6. Repeat with your hand facing down toward the floor. Switch arms. Resisted wrist extension    1. Sit leaning forward with your legs slightly spread. Then place your affected forearm on your thigh with your hand and wrist in front of your knee. 2. Grasp one end of an exercise band with your palm down, and step on the other end.  3. Slowly bend your wrist upward for a count of 2, then lower your wrist slowly to a count of 5.  4. Repeat 8 to 12 times. Resisted wrist flexion    1. Sit leaning forward with your legs slightly spread. Then place your affected forearm on your thigh with your hand and wrist in front of your knee.   2. Grasp one end of an exercise band with your palm up, and step on the other end.  3. Slowly bend your wrist upward for a count of 2, then lower your wrist slowly to a count of 5.  4. Repeat 8 to 12 times. Neck stretch to the side    1. This stretch works best if you keep your shoulder down as you lean away from it. To help you remember to do this, start by relaxing your shoulders and lightly holding on to your thighs or your chair. 2. Tilt your head away from your affected elbow and toward your opposite shoulder. For example, if your right elbow is sore, keep your right shoulder down as you lean your head toward your left shoulder. 3. Hold for 15 to 30 seconds. Let the weight of your head stretch your muscles. 4. If you would like a little added stretch, use your hand to gently and steadily pull your head toward your shoulder. For example, if your right elbow is sore, use your left hand to gently pull your head toward your left shoulder. 5. Repeat 2 to 4 times. Resisted forearm pronation    1. Sit leaning forward with your legs slightly spread. Then place your affected forearm on your thigh with your hand and wrist in front of your knee. 2. Grasp one end of an exercise band with your palm up, and step on the other end. 3. Keeping your wrist straight, roll your palm inward toward your thigh for a count of 2, then slowly move your wrist back to the starting position to a count of 5.  4. Repeat 8 to 12 times. Resisted supination    1. Sit leaning forward with your legs slightly spread. Then place your affected forearm on your thigh with your hand and wrist in front of your knee. 2. Grasp one end of an exercise band with your palm down, and step on the other end. 3. Keeping your wrist straight, roll your palm outward and away from your thigh for a count of 2, then slowly move your wrist back to the starting position to a count of 5.  4. Repeat 8 to 12 times. Follow-up care is a key part of your treatment and safety.  Be sure to make and go to all appointments, and call your doctor if you are having problems. It's also a good idea to know your test results and keep a list of the medicines you take. Where can you learn more? Go to http://jcarlos-breanna.info/. Enter [phone] in the search box to learn more about \"Golfer's Elbow: Exercises. \"  Current as of: March 21, 2017  Content Version: 11.4  © 2006-2017 [employer]. Care instructions adapted under license by [employer] (which disclaims liability or warranty for this information). If you have questions about a medical condition or this instruction, always ask your healthcare professional. [place] [address] any warranty or liability for your use of this information. Knee: Exercises  Your Care Instructions  Here are some examples of exercises for your knee. Start each exercise slowly. Ease off the exercise if you start to have pain. Your doctor or physical therapist will tell you when you can start these exercises and which ones will work best for you. How to do the exercises  Quad sets    7. Sit with your leg straight and supported on the floor or a firm bed. (If you feel discomfort in the front or back of your knee, place a small towel roll under your knee.)  8. Tighten the muscles on top of your thigh by pressing the back of your knee flat down to the floor. (If you feel discomfort under your kneecap, place a small towel roll under your knee.)  9. Hold for about 6 seconds, then rest for up to 10 seconds. 10. Do 8 to 12 repetitions several times a day. Straight-leg raises to the front    5. Lie on your back with your good knee bent so that your foot rests flat on the floor. Your injured leg should be straight. Make sure that your low back has a normal curve. You should be able to slip your flat hand in between the floor and the small of your back, with your palm touching the floor and your back touching the back of your hand.   6. Tighten the thigh muscles in the injured leg by pressing the back of your knee flat down to the floor. Hold your knee straight. 7. Keeping the thigh muscles tight, lift your injured leg up so that your heel is about 12 inches off the floor. Hold for about 6 seconds and then lower slowly. 8. Do 8 to 12 repetitions, 3 times a day. Straight-leg raises to the outside    7. Lie on your side, with your injured leg on top. 8. Tighten the front thigh muscles of your injured leg to keep your knee straight. 9. Keep your hip and your leg straight in line with the rest of your body, and keep your knee pointing forward. Do not drop your hip back. 10. Lift your injured leg straight up toward the ceiling, about 12 inches off the floor. Hold for about 6 seconds, then slowly lower your leg. 11. Do 8 to 12 repetitions. Straight-leg raises to the back    7. Lie on your stomach, and lift your leg straight up behind you (toward the ceiling). 8. Lift your toes about 6 inches off the floor, hold for about 6 seconds, then lower slowly. 9. Do 8 to 12 repetitions. Straight-leg raises to the inside    5. Lie on the side of your body with the injured leg. 6. You can either prop your other (good) leg up on a chair, or you can bend your good knee and put that foot in front of your injured knee. Do not drop your hip back. 7. Tighten the muscles on the front of your thigh to straighten your injured knee. 8. Keep your kneecap pointing forward, and lift your whole leg up toward the ceiling about 6 inches. Hold for about 6 seconds, then lower slowly. 9. Do 8 to 12 repetitions. Heel dig bridging    5. Lie on your back with both knees bent and your ankles bent so that only your heels are digging into the floor. Your knees should be bent about 90 degrees. 6. Then push your heels into the floor, squeeze your buttocks, and lift your hips off the floor until your shoulders, hips, and knees are all in a straight line.   7. Hold for about 6 seconds as you continue to breathe normally, and then slowly lower your hips back down to the floor and rest for up to 10 seconds. 8. Do 8 to 12 repetitions. Hamstring curls    6. Lie on your stomach with your knees straight. If your kneecap is uncomfortable, roll up a washcloth and put it under your leg just above your kneecap. 7. Lift the foot of your injured leg by bending the knee so that you bring the foot up toward your buttock. If this motion hurts, try it without bending your knee quite as far. This may help you avoid any painful motion. 8. Slowly lower your leg back to the floor. 9. Do 8 to 12 repetitions. 10. With permission from your doctor or physical therapist, you may also want to add a cuff weight to your ankle (not more than 5 pounds). With weight, you do not have to lift your leg more than 12 inches to get a hamstring workout. Shallow standing knee bends    Do this exercise only if you have very little pain; if you have no clicking, locking, or giving way if you have an injured knee; and if it does not hurt while you are doing 8 to 12 repetitions. 5. Stand with your hands lightly resting on a counter or chair in front of you. Put your feet shoulder-width apart. 6. Slowly bend your knees so that you squat down like you are going to sit in a chair. Make sure your knees do not go in front of your toes. 7. Lower yourself about 6 inches. Your heels should remain on the floor at all times. 8. Rise slowly to a standing position. Heel raises    5. Stand with your feet a few inches apart, with your hands lightly resting on a counter or chair in front of you. 6. Slowly raise your heels off the floor while keeping your knees straight. 7. Hold for about 6 seconds, then slowly lower your heels to the floor. 8. Do 8 to 12 repetitions several times during the day. Follow-up care is a key part of your treatment and safety. Be sure to make and go to all appointments, and call your doctor if you are having problems.  It's also a good idea to know your test results and keep a list of the medicines you take. Where can you learn more? Go to http://jcarlos-breanna.info/. Enter V258 in the search box to learn more about \"Knee: Exercises. \"  Current as of: March 21, 2017  Content Version: 11.4  © 1342-0934 [employer]. Care instructions adapted under license by [employer] (which disclaims liability or warranty for this information). If you have questions about a medical condition or this instruction, always ask your healthcare professional. [address] any warranty or liability for your use of this information. Learning About High Blood Pressure  What is high blood pressure? Blood pressure is a measure of how hard the blood pushes against the walls of your arteries. It's normal for blood pressure to go up and down throughout the day, but if it stays up, you have high blood pressure. Another name for high blood pressure is hypertension. Two numbers tell you your blood pressure. The first number is the systolic pressure. It shows how hard the blood pushes when your heart is pumping. The second number is the diastolic pressure. It shows how hard the blood pushes between heartbeats, when your heart is relaxed and filling with blood. A blood pressure of less than 120/80 (say \"120 over 80\") is ideal for an adult. High blood pressure is 140/90 or higher. You have high blood pressure if your top number is 140 or higher or your bottom number is 90 or higher, or both. Many people fall into the category in between, called prehypertension. People with prehypertension need to make lifestyle changes to bring their blood pressure down and help prevent or delay high blood pressure. What happens when you have high blood pressure? · Blood flows through your arteries with too much force. Over time, this damages the walls of your arteries. But you can't feel it. High blood pressure usually doesn't cause symptoms.   · Fat and calcium start to build up in your arteries. This buildup is called plaque. Plaque makes your arteries narrower and stiffer. Blood can't flow through them as easily. · This lack of good blood flow starts to damage some of the organs in your body. This can lead to problems such as coronary artery disease and heart attack, heart failure, stroke, kidney failure, and eye damage. How can you prevent high blood pressure? · Stay at a healthy weight. · Try to limit how much sodium you eat to less than 2,300 milligrams (mg) a day. If you limit your sodium to 1,500 mg a day, you can lower your blood pressure even more. ¨ Buy foods that are labeled \"unsalted,\" \"sodium-free,\" or \"low-sodium. \" Foods labeled \"reduced-sodium\" and \"light sodium\" may still have too much sodium. ¨ Flavor your food with garlic, lemon juice, onion, vinegar, herbs, and spices instead of salt. Do not use soy sauce, steak sauce, onion salt, garlic salt, mustard, or ketchup on your food. ¨ Use less salt (or none) when recipes call for it. You can often use half the salt a recipe calls for without losing flavor. · Be physically active. Get at least 30 minutes of exercise on most days of the week. Walking is a good choice. You also may want to do other activities, such as running, swimming, cycling, or playing tennis or team sports. · Limit alcohol to 2 drinks a day for men and 1 drink a day for women. · Eat plenty of fruits, vegetables, and low-fat dairy products. Eat less saturated and total fats. How is high blood pressure treated? · Your doctor will suggest making lifestyle changes. For example, your doctor may ask you to eat healthy foods, quit smoking, lose extra weight, and be more active. · If lifestyle changes don't help enough or your blood pressure is very high, you will have to take medicine every day. Follow-up care is a key part of your treatment and safety.  Be sure to make and go to all appointments, and call your doctor if you are having problems. It's also a good idea to know your test results and keep a list of the medicines you take. Where can you learn more? Go to http://jcarlos-breanna.info/. Enter P501 in the search box to learn more about \"Learning About High Blood Pressure. \"  Current as of: September 21, 2016  Content Version: 11.4  © 9366-9515 [employer]. Care instructions adapted under license by [employer] (which disclaims liability or warranty for this information). If you have questions about a medical condition or this instruction, always ask your healthcare professional. [place] [address] any warranty or liability for your use of this information.

## 2018-02-28 NOTE — MR AVS SNAPSHOT
[address] [facility] 
 
 
 [address] [facility] [address] [id number] 
[phone] Patient: Dennis Mayer MRN: [id number] :1970 Visit Information Date & Time Provider Department Dept. Phone Encounter #  
 2018 11:00 AM Constantine Moreno MD [facility] [phone] [id number] Follow-up Instructions Return in about 2 months (around 2018). Your Appointments 2018  8:45 AM  
ROUTINE CARE with Constantine Moreno MD  
[facility] [address] [facility]) Appt Note: routine follow up  
 [address] [facility] [address] [id number]  
[phone] [address] Upcoming Health Maintenance Date Due  
 BREAST CANCER SCRN MAMMOGRAM 2018 COLONOSCOPY 2018 PAP AKA CERVICAL CYTOLOGY 2020 DTaP/Tdap/Td series (2 - Td) 3/5/2024 Allergies as of 2018  Review Complete On: 2018 By: Constantine Moreno MD  
  
 Severity Noted Reaction Type Reactions Sulfa (Sulfonamide Antibiotics)  2018    Nausea Only Current Immunizations  Reviewed on 2018 Name Date Influenza Vaccine Jaylen Blend) 2015 Influenza Vaccine (Quad) PF 2018, 2017 Tdap 3/5/2014 Reviewed by Constantine Moreno MD on 2018 at 12:18 PM  
You Were Diagnosed With   
  
 Codes Comments Lump of breast, right    -  Primary ICD-10-CM: N63.10 ICD-9-CM: 611.72 Elevated blood pressure reading without diagnosis of hypertension     ICD-10-CM: R03.0 ICD-9-CM: 796.2 Vitals BP Pulse Temp Resp Height(growth percentile) Weight(growth percentile) 140/84 78 97.9 °F (36.6 °C) (Oral) 16 5' 4\" (1.626 m) 184 lb 9.6 oz (83.7 kg) LMP SpO2 BMI OB Status Smoking Status 2018 (Exact Date) 98% 31.69 kg/m2 Having regular periods Never Smoker Vitals History BMI and BSA Data Body Mass Index Body Surface Area  
 31.69 kg/m 2 1.94 m 2 Preferred Pharmacy Pharmacy Name Phone [facility] [address], [facility] [facility] [address] [phone] Your Updated Medication List  
  
   
This list is accurate as of 2/28/18  6:42 PM.  Always use your most recent med list.  
  
  
  
  
 * amLODIPine 5 mg tablet Commonly known as:  Amie Dutch Take 2 Tabs by mouth daily. * amLODIPine 10 mg tablet Commonly known as:  Amie Dutch Take 1 Tab by mouth daily. CALCIUM-VITAMIN D PO Take 1 Tab by mouth daily. cyanocobalamin 1,000 mcg tablet Take 1,000 mcg by mouth daily. ferrous sulfate 325 mg (65 mg iron) tablet Take 325 mg by mouth Daily (before breakfast). HUMIRA PEN CROHN'S-UC-HS START 40 mg/0.8 mL injection pen Generic drug:  adalimumab 40 mg by SubCUTAneous route every fourteen (14) days. meclizine 12.5 mg tablet Commonly known as:  ANTIVERT Take 1 Tab by mouth three (3) times daily as needed. pantoprazole 40 mg tablet Commonly known as:  PROTONIX Take 1 Tab by mouth daily. PENTASA 500 mg CR capsule Generic drug:  mesalamine Take 1,000 mg by mouth three (3) times daily. PREVIDENT 5000 ENAMEL PROTECT 1.1-5 % Pste Generic drug:  sodium fluoride-pot nitrate APPLY TWO TIMES A DAY * Notice: This list has 2 medication(s) that are the same as other medications prescribed for you. Read the directions carefully, and ask your doctor or other care provider to review them with you. Prescriptions Sent to Pharmacy Refills  
 amLODIPine (NORVASC) 5 mg tablet 3 Sig: Take 2 Tabs by mouth daily. Class: Normal  
 Pharmacy: [facility] [address] [facility] #: [phone] Route: Oral  
  
Follow-up Instructions Return in about 2 months (around 4/28/2018). To-Do List   
 02/28/2018   Imaging:  LOREN MAMMO BI DX INCL CAD   
  
  
 Patient Instructions Golfer's Elbow: Care Instructions Your Care Instructions The pain and soreness in the inner part of your elbow is caused by a problem called golfer's elbow. Bending the wrist over and over again has hurt the tendons that attach to your inner elbow. The muscles in your forearm also may hurt. Golelizabeth's elbow usually gets better with treatment at home. Follow-up care is a key part of your treatment and safety. Be sure to make and go to all appointments, and call your doctor if you are having problems. It's also a good idea to know your test results and keep a list of the medicines you take. How can you care for yourself at home? · Rest your elbow and wrist. Try to avoid movements that are painful. You may have to do this for weeks to months. Follow your doctor's directions for how long to rest. 
· Put ice or a cold pack on your elbow for 10 to 20 minutes at a time. Try to do this every 1 to 2 hours for the next 3 days (when you are awake) or until the swelling goes down. Put a thin cloth between the ice and your skin. · Prop up the sore arm on a pillow when you ice it or anytime you sit or lie down during the next 3 days. Try to keep it above the level of your heart. This will help reduce swelling. · Take pain medicine exactly as directed. ¨ If the doctor gave you a prescription medicine for pain, take it as prescribed. ¨ If you are not taking a prescription pain medicine, ask your doctor if you can take an over-the-counter medicine. · If your doctor gaveyou a brace or splint, use it as directed. A \"counterforce\" brace is a strap around the forearm, justbelow the elbow. It may ease the pressure on the tendon and may spread force throughout thearm. · Follow your doctor's or physical therapist's directions for exercise. To prevent golfer's elbow · After your elbow has healed, learn the best techniques for your work or sport. A physical or occupational therapist can help you. When should you call for help? Call your doctor now or seek immediate medical care if: 
? · Your pain gets worse. ? · You cannot bend your elbow normally. ? · You have tingling, weakness, or numbness in your hand and fingers. ? · Your arm or hand is cool or pale or changes color. ? Watch closely for changes in your health, and be sure to contact your doctor if: 
? · You have work problems caused by your elbow pain. ? · Your pain is not better after 2 weeks. Where can you learn more? Go to http://jcarlos-breanna.info/. Enter I858 in the search box to learn more about \"Golfer's Elbow: Care Instructions. \" Current as of: March 21, 2017 Content Version: 11.4 © 4312-4270 DocVerse. Care instructions adapted under license by [employer] (which disclaims liability or warranty for this information). If you have questions about a medical condition or this instruction, always ask your healthcare professional. [address] any warranty or liability for your use of this information. Golfer's Elbow: Exercises Your Care Instructions Here are some examples of typical rehabilitation exercises for your condition. Start each exercise slowly. Ease off the exercise if you start to have pain. Your doctor or physical therapist will tell you when you can start these exercises and which ones will work best for you. How to do the exercises Wrist extensor stretch 1. Extend your affected arm in front of you and make a fist with your palm facing down. 2. Bend your wrist so that your fist points toward the floor. 3. With your other hand, gently bend your wrist farther until you feel a mild to moderate stretch in your forearm. 4. Hold for at least 15 to 30 seconds. 5. Repeat 2 to 4 times. 6. Repeat steps 1 through 5 with your fingers pointing toward the floor. Forearm extensor stretch 1. Place your affected elbow down at your side, bent at about 90 degrees. Then make a fist with your palm facing down. 2. Keeping your wrist bent, slowly straighten your elbow so your arm is down at your side. Then twist your fist out so your palm is facing out to the side and you feel a stretch. 3. Hold for at least 15 to 30 seconds. 4. Repeat 2 to 4 times. Wrist flexor stretch 1. Extend your affected arm in front of you with your palm facing away from your body. 2. Bend back your wrist, pointing your hand up toward the ceiling. 3. With your other hand, gently bend your wrist farther until you feel a mild to moderate stretch in your forearm. 4. Hold for at least 15 to 30 seconds. 5. Repeat 2 to 4 times. 6. Repeat steps 1 through 5, but this time extend your affected arm in front of you with your palm facing up. Then bend back your wrist, pointing your hand toward the floor. Wrist curls 1. Place your forearm on a table with your hand hanging over the edge of the table, palm up. 2. Place a 1- to 2-pound weight in your hand. This may be a dumbbell, a can of food, or a filled water bottle. 3. Slowly raise and lower the weight while keeping your forearm on the table and your palm facing up. 4. Repeat this motion 8 to 12 times. 5. Switch arms, and do steps 1 through 4. 
6. Repeat with your hand facing down toward the floor. Switch arms. Resisted wrist extension 1. Sit leaning forward with your legs slightly spread. Then place your affected forearm on your thigh with your hand and wrist in front of your knee. 2. Grasp one end of an exercise band with your palm down, and step on the other end. 
3. Slowly bend your wrist upward for a count of 2, then lower your wrist slowly to a count of 5. 
4. Repeat 8 to 12 times. Resisted wrist flexion 1. Sit leaning forward with your legs slightly spread. Then place your affected forearm on your thigh with your hand and wrist in front of your knee. 2. Grasp one end of an exercise band with your palm up, and step on the other end. 
3. Slowly bend your wrist upward for a count of 2, then lower your wrist slowly to a count of 5. 
4. Repeat 8 to 12 times. Neck stretch to the side 1. This stretch works best if you keep your shoulder down as you lean away from it. To help you remember to do this, start by relaxing your shoulders and lightly holding on to your thighs or your chair. 2. Tilt your head away from your affected elbow and toward your opposite shoulder. For example, if your right elbow is sore, keep your right shoulder down as you lean your head toward your left shoulder. 3. Hold for 15 to 30 seconds. Let the weight of your head stretch your muscles. 4. If you would like a little added stretch, use your hand to gently and steadily pull your head toward your shoulder. For example, if your right elbow is sore, use your left hand to gently pull your head toward your left shoulder. 5. Repeat 2 to 4 times. Resisted forearm pronation 1. Sit leaning forward with your legs slightly spread. Then place your affected forearm on your thigh with your hand and wrist in front of your knee. 2. Grasp one end of an exercise band with your palm up, and step on the other end. 3. Keeping your wrist straight, roll your palm inward toward your thigh for a count of 2, then slowly move your wrist back to the starting position to a count of 5. 
4. Repeat 8 to 12 times. Resisted supination 1. Sit leaning forward with your legs slightly spread. Then place your affected forearm on your thigh with your hand and wrist in front of your knee. 2. Grasp one end of an exercise band with your palm down, and step on the other end. 3. Keeping your wrist straight, roll your palm outward and away from your thigh for a count of 2, then slowly move your wrist back to the starting position to a count of 5. 
4. Repeat 8 to 12 times. Follow-up care is a key part of your treatment and safety. Be sure to make and go to all appointments, and call your doctor if you are having problems. It's also a good idea to know your test results and keep a list of the medicines you take. Where can you learn more? Go to http://jcarlos-breanna.info/. Enter [phone] in the search box to learn more about \"Golfer's Elbow: Exercises. \" Current as of: March 21, 2017 Content Version: 11.4 © 0435-1478 InfoGin. Care instructions adapted under license by abusix (which disclaims liability or warranty for this information). If you have questions about a medical condition or this instruction, always ask your healthcare professional. [address] any warranty or liability for your use of this information. Knee: Exercises Your Care Instructions Here are some examples of exercises for your knee. Start each exercise slowly. Ease off the exercise if you start to have pain. Your doctor or physical therapist will tell you when you can start these exercises and which ones will work best for you. How to do the exercises [facility] 7. Sit with your leg straight and supported on the floor or a firm bed. (If you feel discomfort in the front or back of your knee, place a small towel roll under your knee.) 8. Tighten the muscles on top of your thigh by pressing the back of your knee flat down to the floor. (If you feel discomfort under your kneecap, place a small towel roll under your knee.) 9. Hold for about 6 seconds, then rest for up to 10 seconds. 10. Do 8 to 12 repetitions several times a day. Straight-leg raises to the front 5. Lie on your back with your good knee bent so that your foot rests flat on the floor. Your injured leg should be straight. Make sure that your low back has a normal curve.  You should be able to slip your flat hand in between the floor and the small of your back, with your palm touching the floor and your back touching the back of your hand. 6. Tighten the thigh muscles in the injured leg by pressing the back of your knee flat down to the floor. Hold your knee straight. 7. Keeping the thigh muscles tight, lift your injured leg up so that your heel is about 12 inches off the floor. Hold for about 6 seconds and then lower slowly. 8. Do 8 to 12 repetitions, 3 times a day. Straight-leg raises to the outside 7. Lie on your side, with your injured leg on top. 8. Tighten the front thigh muscles of your injured leg to keep your knee straight. 9. Keep your hip and your leg straight in line with the rest of your body, and keep your knee pointing forward. Do not drop your hip back. 10. Lift your injured leg straight up toward the ceiling, about 12 inches off the floor. Hold for about 6 seconds, then slowly lower your leg. 11. Do 8 to 12 repetitions. Straight-leg raises to the back 7. Lie on your stomach, and lift your leg straight up behind you (toward the ceiling). 8. Lift your toes about 6 inches off the floor, hold for about 6 seconds, then lower slowly. 9. Do 8 to 12 repetitions. Straight-leg raises to the inside 5. Lie on the side of your body with the injured leg. 6. You can either prop your other (good) leg up on a chair, or you can bend your good knee and put that foot in front of your injured knee. Do not drop your hip back. 7. Tighten the muscles on the front of your thigh to straighten your injured knee. 8. Keep your kneecap pointing forward, and lift your whole leg up toward the ceiling about 6 inches. Hold for about 6 seconds, then lower slowly. 9. Do 8 to 12 repetitions. Heel dig bridging 5. Lie on your back with both knees bent and your ankles bent so that only your heels are digging into the floor. Your knees should be bent about 90 degrees. 6. Then push your heels into the floor, squeeze your buttocks, and lift your hips off the floor until your shoulders, hips, and knees are all in a straight line. 7. Hold for about 6 seconds as you continue to breathe normally, and then slowly lower your hips back down to the floor and rest for up to 10 seconds. 8. Do 8 to 12 repetitions. Hamstring curls 6. Lie on your stomach with your knees straight. If your kneecap is uncomfortable, roll up a washcloth and put it under your leg just above your kneecap. 7. Lift the foot of your injured leg by bending the knee so that you bring the foot up toward your buttock. If this motion hurts, try it without bending your knee quite as far. This may help you avoid any painful motion. 8. Slowly lower your leg back to the floor. 9. Do 8 to 12 repetitions. 10. With permission from your doctor or physical therapist, you may also want to add a cuff weight to your ankle (not more than 5 pounds). With weight, you do not have to lift your leg more than 12 inches to get a hamstring workout. Shallow standing knee bends Do this exercise only if you have very little pain; if you have no clicking, locking, or giving way if you have an injured knee; and if it does not hurt while you are doing 8 to 12 repetitions. 5. Stand with your hands lightly resting on a counter or chair in front of you. Put your feet shoulder-width apart. 6. Slowly bend your knees so that you squat down like you are going to sit in a chair. Make sure your knees do not go in front of your toes. 7. Lower yourself about 6 inches. Your heels should remain on the floor at all times. 8. Rise slowly to a standing position. Heel raises 5. Stand with your feet a few inches apart, with your hands lightly resting on a counter or chair in front of you. 6. Slowly raise your heels off the floor while keeping your knees straight. 7. Hold for about 6 seconds, then slowly lower your heels to the floor. 8. Do 8 to 12 repetitions several times during the day. Follow-up care is a key part of your treatment and safety. Be sure to make and go to all appointments, and call your doctor if you are having problems. It's also a good idea to know your test results and keep a list of the medicines you take. Where can you learn more? Go to http://jcarlos-breanna.info/. Enter V876 in the search box to learn more about \"Knee: Exercises. \" Current as of: March 21, 2017 Content Version: 11.4 © 3289-0648 BIW Technologies. Care instructions adapted under license by Cream.HR (which disclaims liability or warranty for this information). If you have questions about a medical condition or this instruction, always ask your healthcare professional. [address] any warranty or liability for your use of this information. Learning About High Blood Pressure What is high blood pressure? Blood pressure is a measure of how hard the blood pushes against the walls of your arteries. It's normal for blood pressure to go up and down throughout the day, but if it stays up, you have high blood pressure. Another name for high blood pressure is hypertension. Two numbers tell you your blood pressure. The first number is the systolic pressure. It shows how hard the blood pushes when your heart is pumping. The second number is the diastolic pressure. It shows how hard the blood pushes between heartbeats, when your heart is relaxed and filling with blood. A blood pressure of less than 120/80 (say \"120 over 80\") is ideal for an adult. High blood pressure is 140/90 or higher. You have high blood pressure if your top number is 140 or higher or your bottom number is 90 or higher, or both. Many people fall into the category in between, called prehypertension.  People with prehypertension need to make lifestyle changes to bring their blood pressure down and help prevent or delay high blood pressure. What happens when you have high blood pressure? · Blood flows through your arteries with too much force. Over time, this damages the walls of your arteries. But you can't feel it. High blood pressure usually doesn't cause symptoms. · Fat and calcium start to build up in your arteries. This buildup is called plaque. Plaque makes your arteries narrower and stiffer. Blood can't flow through them as easily. · This lack of good blood flow starts to damage some of the organs in your body. This can lead to problems such as coronary artery disease and heart attack, heart failure, stroke, kidney failure, and eye damage. How can you prevent high blood pressure? · Stay at a healthy weight. · Try to limit how much sodium you eat to less than 2,300 milligrams (mg) a day. If you limit your sodium to 1,500 mg a day, you can lower your blood pressure even more. ¨ Buy foods that are labeled \"unsalted,\" \"sodium-free,\" or \"low-sodium. \" Foods labeled \"reduced-sodium\" and \"light sodium\" may still have too much sodium. ¨ Flavor your food with garlic, lemon juice, onion, vinegar, herbs, and spices instead of salt. Do not use soy sauce, steak sauce, onion salt, garlic salt, mustard, or ketchup on your food. ¨ Use less salt (or none) when recipes call for it. You can often use half the salt a recipe calls for without losing flavor. · Be physically active. Get at least 30 minutes of exercise on most days of the week. Walking is a good choice. You also may want to do other activities, such as running, swimming, cycling, or playing tennis or team sports. · Limit alcohol to 2 drinks a day for men and 1 drink a day for women. · Eat plenty of fruits, vegetables, and low-fat dairy products. Eat less saturated and total fats. How is high blood pressure treated? · Your doctor will suggest making lifestyle changes.  For example, your doctor may ask you to eat healthy foods, quit smoking, lose extra weight, and be more active. · If lifestyle changes don't help enough or your blood pressure is very high, you will have to take medicine every day. Follow-up care is a key part of your treatment and safety. Be sure to make and go to all appointments, and call your doctor if you are having problems. It's also a good idea to know your test results and keep a list of the medicines you take. Where can you learn more? Go to http://jcarlos-breanna.info/. Enter P501 in the search box to learn more about \"Learning About High Blood Pressure. \" Current as of: September 21, 2016 Content Version: 11.4 © 3772-9702 Evomail. Care instructions adapted under license by [employer]urb (which disclaims liability or warranty for this information). If you have questions about a medical condition or this instruction, always ask your healthcare professional. [address] any warranty or liability for your use of this information. Introducing [facility]! Dear Javon Tabor: 
Thank you for requesting a [employer] account. Our records indicate that you already have an active [employer] account. You can access your account anytime at [email or web address] Did you know that you can access your hospital and ER discharge instructions at any time in [employer]? You can also review all of your test results from your hospital stay or ER visit. Additional Information If you have questions, please visit the Frequently Asked Questions section of the [employer] website at [email or web address] Remember, [employer] is NOT to be used for urgent needs. For medical emergencies, dial 911. Now available from your iPhone and Android! Please provide this summary of care documentation to your next provider. Your primary care clinician is listed as Kanchan Adams.  If you have any questions after today's visit, please call [phone].

## 2018-02-28 NOTE — PROGRESS NOTES
HISTORY OF PRESENT ILLNESS  Josh Dorado is a 49 y.o. female. HPI   Follow up on BP. Has hx of Crohn's disease being followed by GI. Just started on Humira injections. C/o right elbow pain for the past several weeks. Pain is on the inside of the elbow. Pain comes and goes. No injury noted. Has no swelling. Has not taken anything for the pain. Pain is 5-6/10. Also c/o left knee giving away with walking. No pain noted. No injury. Cardiovascular Review:  The patient has hypertension. Diet and Lifestyle: generally follows a low fat low cholesterol diet, generally follows a low sodium diet, exercises sporadically  Home BP Monitoring: is not measured at home. Pertinent ROS: taking medications as instructed, no medication side effects noted, no TIA's, no chest pain on exertion, no dyspnea on exertion, no swelling of ankles, no palpitations. Patient Active Problem List   Diagnosis Code    Hypovitaminosis D E55.9    Herpes genitalia A60.00    Anemia D64.9    IGT (impaired glucose tolerance) R73.02    Crohn's disease without complication ([facility] M73.63    Encounter for medication monitoring Z51.81    Non morbid obesity E66.9       Current Outpatient Prescriptions   Medication Sig Dispense Refill    pantoprazole (PROTONIX) 40 mg tablet Take 1 Tab by mouth daily. 30 Tab 3    amLODIPine (NORVASC) 5 mg tablet Take 1 Tab by mouth daily. 30 Tab 3    meclizine (ANTIVERT) 12.5 mg tablet Take 1 Tab by mouth three (3) times daily as needed. 30 Tab 0    PENTASA 500 mg CR capsule Take 1,000 mg by mouth three (3) times daily.  CALCIUM CARB/VIT D3/MINERALS (CALCIUM-VITAMIN D PO) Take 1 Tab by mouth daily.  cyanocobalamin 1,000 mcg tablet Take 1,000 mcg by mouth daily.  ferrous sulfate 325 mg (65 mg iron) tablet Take 325 mg by mouth Daily (before breakfast).       PREVIDENT 5000 ENAMEL PROTECT 1.1-5 % pste APPLY TWO TIMES A DAY  3       No Known Allergies    Past Medical History:   Diagnosis Date    Crohn disease ([facility] [address]     5/2017    Environmental allergies     GERD (gastroesophageal reflux disease)     Herpes genitalia        Past Surgical History:   Procedure Laterality Date    HX GYN  1991    Laser- HPV       Family History   Problem Relation Age of Onset    Heart Disease Mother     Bleeding Prob Mother      was on coumadin    Stroke Father     Cancer Sister      lymphoma    Diabetes Brother     Schizophrenia Brother     Diabetes Sister     Diabetes Sister     Mental Retardation Sister     No Known Problems Sister     Other Brother      paralysis from car accident    Heart Disease Brother     Pacemaker Brother        Social History   Substance Use Topics    Smoking status: Never Smoker    Smokeless tobacco: Never Used    Alcohol use Yes      Comment: occasional        Lab Results  Component Value Date/Time   WBC 9.0 01/23/2017 03:12 PM   HGB 9.6 (L) 01/23/2017 03:12 PM   HCT 31.1 (L) 01/23/2017 03:12 PM   PLATELET 882 79/31/8575 03:12 PM   MCV 71 (L) 01/23/2017 03:12 PM     Lab Results  Component Value Date/Time   Cholesterol, total 180 01/31/2018 11:11 AM   HDL Cholesterol 44 01/31/2018 11:11 AM   LDL, calculated 117 (H) 01/31/2018 11:11 AM   Triglyceride 93 01/31/2018 11:11 AM     Lab Results  Component Value Date/Time   TSH 1.250 09/02/2015 10:21 AM      Lab Results   Component Value Date/Time    Sodium 139 01/31/2018 11:11 AM    Potassium 4.3 01/31/2018 11:11 AM    Chloride 99 01/31/2018 11:11 AM    CO2 23 01/31/2018 11:11 AM    Glucose 99 01/31/2018 11:11 AM    BUN 9 01/31/2018 11:11 AM    Creatinine 0.79 01/31/2018 11:11 AM    BUN/Creatinine ratio 11 01/31/2018 11:11 AM    GFR est  01/31/2018 11:11 AM    GFR est non-AA 89 01/31/2018 11:11 AM    Calcium 9.1 01/31/2018 11:11 AM    Bilirubin, total 0.2 01/31/2018 11:11 AM    ALT (SGPT) 12 01/31/2018 11:11 AM    AST (SGOT) 16 01/31/2018 11:11 AM    Alk.  phosphatase 85 01/31/2018 11:11 AM    Protein, total 6.6 01/31/2018 11:11 AM    Albumin 4.0 01/31/2018 11:11 AM    A-G Ratio 1.5 01/31/2018 11:11 AM      Lab Results   Component Value Date/Time    Hemoglobin A1c 6.4 (H) 03/05/2014 12:58 PM    Hemoglobin A1c (POC) 5.8 01/31/2018 11:11 AM         Review of Systems   Constitutional: Negative for malaise/fatigue. HENT: Negative for congestion. Eyes: Negative for blurred vision. Respiratory: Negative for cough and shortness of breath. Cardiovascular: Negative for chest pain, palpitations and leg swelling. Gastrointestinal: Negative for abdominal pain, constipation and heartburn. Genitourinary: Negative for dysuria, frequency and urgency. Musculoskeletal: Negative for back pain and joint pain. Neurological: Negative for dizziness, tingling and headaches. Endo/Heme/Allergies: Negative for environmental allergies. Psychiatric/Behavioral: Negative for depression. The patient does not have insomnia. Physical Exam   Constitutional: She appears well-developed and well-nourished. /84  Pulse 78  Temp 97.9 °F (36.6 °C) (Oral)   Resp 16  Ht 5' 4\" (1.626 m)  Wt 184 lb 9.6 oz (83.7 kg)  LMP 01/29/2018 (Exact Date)  SpO2 98%  BMI 31.69 kg/m2     HENT:   Right Ear: Tympanic membrane and ear canal normal.   Left Ear: Tympanic membrane and ear canal normal.   Nose: No mucosal edema or rhinorrhea. Mouth/Throat: Oropharynx is clear and moist and mucous membranes are normal.   Neck: Normal range of motion. Neck supple. No thyromegaly present. Cardiovascular: Normal rate and regular rhythm. No murmur heard. Pulmonary/Chest: Effort normal and breath sounds normal.   Abdominal: Soft. Bowel sounds are normal. There is no tenderness. Musculoskeletal: Normal range of motion. She exhibits no edema. Right elbow: She exhibits normal range of motion and no swelling. Tenderness found. Medial epicondyle tenderness noted.         Left knee: She exhibits normal range of motion, no swelling, no effusion, no LCL laxity and no MCL laxity. No tenderness found. No medial joint line and no lateral joint line tenderness noted. Lymphadenopathy:     She has no cervical adenopathy. Skin: Skin is warm and dry. Psychiatric: She has a normal mood and affect. Nursing note and vitals reviewed. ASSESSMENT and PLAN  Diagnoses and all orders for this visit:    1. Essential hypertension  -     Increase amLODIPine (NORVASC) 5 mg tablet; Take 2 Tabs by mouth daily. 2. Crohn's disease without complication, unspecified gastrointestinal tract location ([address]  Stable. As per GI. 3. Medial epicondylitis of right elbow  Instructions for exercises given and reviewed with pt. Pt also to use heat to the area 3-4 times a day over the next several days until sx are improved. 4. Joint laxity of left knee  Instructions for exercises given and reviewed with pt. Pt also to use heat to the area 3-4 times a day over the next several days until sx are improved. 5. Encounter for medication monitoring      Follow-up Disposition:  Return in about 2 months (around 4/28/2018). reviewed diet, exercise and weight control  cardiovascular risk and specific lipid/LDL goals reviewed  reviewed medications and side effects in detail    I have discussed diagnosis listed in this note with pt and/or family. I have discussed treatment plans and options and the risk/benefit analysis of those options, including safe use of medications and possible medication side effects. Through the use of shared decision making we have agreed to the above plan. The patient has received an after-visit summary and questions were answered concerning future plans and follow up. Advise pt of any urgent changes then to proceed to the ER.

## 2018-02-28 NOTE — PROGRESS NOTES
.  Chief Complaint   Patient presents with    Hypertension     follow up           Mammogram 2/28/2018    Colonoscopy 5/16/2017 by Dr. Iftikhar Brown and per patient needs to have another colonoscopy in 1 year.

## 2018-04-30 ENCOUNTER — OFFICE VISIT (OUTPATIENT)
Dept: FAMILY MEDICINE CLINIC | Age: 48
End: 2018-04-30

## 2018-04-30 VITALS
TEMPERATURE: 98.1 F | BODY MASS INDEX: 31.82 KG/M2 | WEIGHT: 186.4 LBS | DIASTOLIC BLOOD PRESSURE: 88 MMHG | RESPIRATION RATE: 20 BRPM | OXYGEN SATURATION: 20 % | HEART RATE: 84 BPM | SYSTOLIC BLOOD PRESSURE: 138 MMHG | HEIGHT: 64 IN

## 2018-04-30 DIAGNOSIS — M77.01 MEDIAL EPICONDYLITIS, RIGHT: ICD-10-CM

## 2018-04-30 DIAGNOSIS — Z51.81 ENCOUNTER FOR MEDICATION MONITORING: ICD-10-CM

## 2018-04-30 DIAGNOSIS — R73.02 IGT (IMPAIRED GLUCOSE TOLERANCE): ICD-10-CM

## 2018-04-30 DIAGNOSIS — M76.892 TENDINITIS OF LEFT KNEE: ICD-10-CM

## 2018-04-30 DIAGNOSIS — K50.90 CROHN'S DISEASE WITHOUT COMPLICATION, UNSPECIFIED GASTROINTESTINAL TRACT LOCATION (HCC): ICD-10-CM

## 2018-04-30 DIAGNOSIS — K21.9 GASTROESOPHAGEAL REFLUX DISEASE, ESOPHAGITIS PRESENCE NOT SPECIFIED: ICD-10-CM

## 2018-04-30 DIAGNOSIS — E66.9 NON MORBID OBESITY: ICD-10-CM

## 2018-04-30 DIAGNOSIS — I10 ESSENTIAL HYPERTENSION: Primary | ICD-10-CM

## 2018-04-30 RX ORDER — CHLORTHALIDONE 25 MG/1
12.5 TABLET ORAL DAILY
Qty: 30 TAB | Refills: 3 | Status: SHIPPED | OUTPATIENT
Start: 2018-04-30 | End: 2018-08-01 | Stop reason: SDUPTHER

## 2018-04-30 RX ORDER — PANTOPRAZOLE SODIUM 40 MG/1
40 TABLET, DELAYED RELEASE ORAL AS NEEDED
COMMUNITY
End: 2018-08-01 | Stop reason: CLARIF

## 2018-04-30 NOTE — PROGRESS NOTES
Chief Complaint   Patient presents with    Hypertension     follow up    Elbow Pain     patient c/o sharp pain in right elbow    Leg Pain     patient left leg gives out at times     Mammogram 02/28/2018    Colonoscopy 05/16/2017 Dr. Latrice Chavez repeat in 1 year    Patient stated last eye exam 09/2017    1. Have you been to the ER, urgent care clinic since your last visit? Hospitalized since your last visit? No    2. Have you seen or consulted any other health care providers outside of the [address] [place] [place] [address] since your last visit? Include any pap smears or colon screening. No    Patient denies pain at this time.

## 2018-04-30 NOTE — PROGRESS NOTES
HISTORY OF PRESENT ILLNESS  Neris Nance is a 49 y.o. female. HPI   Follow up on BP. Has hx of Crohn's disease being followed by GI. Just started on Humira injections. C/o right elbow pain for the past several weeks. Pain is on the inside of the elbow. Pain comes and goes. No injury noted. Has no swelling. Has not taken anything for the pain. Pain is 5-6/10. Also c/o left knee giving away with walking. Sx is off and on. No pain noted. No injury. No swelling. Cardiovascular Review:  The patient has hypertension. Diet and Lifestyle: generally follows a low fat low cholesterol diet, generally follows a low sodium diet, exercises sporadically  Home BP Monitoring: is not measured at home. Pertinent ROS: taking medications as instructed, no medication side effects noted, no TIA's, no chest pain on exertion, no dyspnea on exertion, no swelling of ankles, no palpitations. Patient Active Problem List   Diagnosis Code    Hypovitaminosis D E55.9    Herpes genitalia A60.00    Anemia D64.9    IGT (impaired glucose tolerance) R73.02    Crohn's disease without complication ([facility] W31.20    Encounter for medication monitoring Z51.81    Non morbid obesity E66.9       Current Outpatient Prescriptions   Medication Sig Dispense Refill    pantoprazole (PROTONIX) 40 mg tablet Take 40 mg by mouth as needed.  chlorthalidone (HYGROTEN) 25 mg tablet Take 0.5 Tabs by mouth daily. 30 Tab 3    HUMIRA PEN CROHN'S-UC-HS START 40 mg/0.8 mL injection pen 40 mg by SubCUTAneous route every fourteen (14) days.  amLODIPine (NORVASC) 10 mg tablet Take 1 Tab by mouth daily. 30 Tab 3    meclizine (ANTIVERT) 12.5 mg tablet Take 1 Tab by mouth three (3) times daily as needed. 30 Tab 0    PENTASA 500 mg CR capsule Take 1,000 mg by mouth three (3) times daily.  CALCIUM CARB/VIT D3/MINERALS (CALCIUM-VITAMIN D PO) Take 1 Tab by mouth daily.  cyanocobalamin 1,000 mcg tablet Take 1,000 mcg by mouth daily.  ferrous sulfate 325 mg (65 mg iron) tablet Take 325 mg by mouth Daily (before breakfast).       PREVIDENT 5000 ENAMEL PROTECT 1.1-5 % pste APPLY TWO TIMES A DAY  3       Allergies   Allergen Reactions    Sulfa (Sulfonamide Antibiotics) Nausea Only       Past Medical History:   Diagnosis Date    Crohn disease ([address]     5/2017    Environmental allergies     GERD (gastroesophageal reflux disease)     Herpes genitalia        Past Surgical History:   Procedure Laterality Date    HX GYN  1991    Laser- HPV       Family History   Problem Relation Age of Onset    Heart Disease Mother     Bleeding Prob Mother      was on coumadin    Stroke Father     Cancer Sister      lymphoma    Diabetes Brother     Schizophrenia Brother     Diabetes Sister     Diabetes Sister     Mental Retardation Sister     No Known Problems Sister     Other Brother      paralysis from car accident    Heart Disease Brother     Pacemaker Brother        Social History   Substance Use Topics    Smoking status: Never Smoker    Smokeless tobacco: Never Used    Alcohol use Yes      Comment: occasional        Lab Results  Component Value Date/Time   WBC 9.0 01/23/2017 03:12 PM   HGB 9.6 (L) 01/23/2017 03:12 PM   HCT 31.1 (L) 01/23/2017 03:12 PM   PLATELET 571 83/28/8507 03:12 PM   MCV 71 (L) 01/23/2017 03:12 PM     Lab Results  Component Value Date/Time   Cholesterol, total 180 01/31/2018 11:11 AM   HDL Cholesterol 44 01/31/2018 11:11 AM   LDL, calculated 117 (H) 01/31/2018 11:11 AM   Triglyceride 93 01/31/2018 11:11 AM     Lab Results  Component Value Date/Time   TSH 1.250 09/02/2015 10:21 AM      Lab Results   Component Value Date/Time    Sodium 139 01/31/2018 11:11 AM    Potassium 4.3 01/31/2018 11:11 AM    Chloride 99 01/31/2018 11:11 AM    CO2 23 01/31/2018 11:11 AM    Glucose 99 01/31/2018 11:11 AM    BUN 9 01/31/2018 11:11 AM    Creatinine 0.79 01/31/2018 11:11 AM    BUN/Creatinine ratio 11 01/31/2018 11:11 AM    GFR est  01/31/2018 11:11 AM    GFR est non-AA 89 01/31/2018 11:11 AM    Calcium 9.1 01/31/2018 11:11 AM    Bilirubin, total 0.2 01/31/2018 11:11 AM    ALT (SGPT) 12 01/31/2018 11:11 AM    AST (SGOT) 16 01/31/2018 11:11 AM    Alk. phosphatase 85 01/31/2018 11:11 AM    Protein, total 6.6 01/31/2018 11:11 AM    Albumin 4.0 01/31/2018 11:11 AM    A-G Ratio 1.5 01/31/2018 11:11 AM      Lab Results   Component Value Date/Time    Hemoglobin A1c 6.4 (H) 03/05/2014 12:58 PM    Hemoglobin A1c (POC) 5.8 01/31/2018 11:11 AM         Review of Systems   Constitutional: Negative for malaise/fatigue. HENT: Negative for congestion. Eyes: Negative for blurred vision. Respiratory: Negative for cough and shortness of breath. Cardiovascular: Negative for chest pain, palpitations and leg swelling. Gastrointestinal: Negative for abdominal pain, constipation and heartburn. Genitourinary: Negative for dysuria, frequency and urgency. Musculoskeletal: Negative for back pain and joint pain. Neurological: Negative for dizziness, tingling and headaches. Endo/Heme/Allergies: Negative for environmental allergies. Psychiatric/Behavioral: Negative for depression. The patient does not have insomnia. Physical Exam   Constitutional: She appears well-developed and well-nourished. /88  Pulse 84  Temp 98.1 °F (36.7 °C) (Oral)   Resp 20  Ht 5' 4\" (1.626 m)  Wt 186 lb 6.4 oz (84.6 kg)  LMP 04/29/2018  SpO2 (!) 20%  BMI 32 kg/m2     HENT:   Right Ear: Tympanic membrane and ear canal normal.   Left Ear: Tympanic membrane and ear canal normal.   Nose: No mucosal edema or rhinorrhea. Mouth/Throat: Oropharynx is clear and moist and mucous membranes are normal.   Neck: Normal range of motion. Neck supple. No thyromegaly present. Cardiovascular: Normal rate and regular rhythm. No murmur heard. Pulmonary/Chest: Effort normal and breath sounds normal.   Abdominal: Soft. Bowel sounds are normal. There is no tenderness. Musculoskeletal: Normal range of motion. She exhibits no edema. Right elbow: She exhibits normal range of motion, no swelling and no effusion. Tenderness found. Medial epicondyle tenderness noted. Left knee: She exhibits normal range of motion, no swelling and no effusion. Tenderness found. Patellar tendon tenderness noted. Lymphadenopathy:     She has no cervical adenopathy. Skin: Skin is warm and dry. Psychiatric: She has a normal mood and affect. Nursing note and vitals reviewed. ASSESSMENT and PLAN  Diagnoses and all orders for this visit:    1. Essential hypertension  -     chlorthalidone (HYGROTEN) 25 mg tablet; Take 0.5 Tabs by mouth daily. Discussed sodium restriction, high k rich diet, maintaining ideal body weight and regular exercise program such as daily walking 30 min perday 4-5 times per week, as physiologic means to achieve blood pressure control.  Medication compliance advised. 2. Crohn's disease without complication, unspecified gastrointestinal tract location ([facility]  Stable   Follow up with GI as planned. 3. IGT (impaired glucose tolerance)  Continue to monitor. Work on diet and exercise. 4. Medial epicondylitis, right  -     REFERRAL TO ORTHOPEDIC SURGERY    5. Tendinitis of left knee  Knee strengthening exercises reviewed. 6. Gastroesophageal reflux disease, esophagitis presence not specified  Stable on protonix    7. Encounter for medication monitoring    8. Non morbid obesity  I have reviewed/discussed the above normal BMI with the patient. I have recommended the following interventions: dietary management education, guidance, and counseling . Follow-up Disposition:  Return in about 3 months (around 7/30/2018).   reviewed diet, exercise and weight control  cardiovascular risk and specific lipid/LDL goals reviewed  reviewed medications and side effects in detail  specific diabetic recommendations: low cholesterol diet, weight control and daily exercise discussed and glycohemoglobin and other lab monitoring discussed     I have discussed diagnosis listed in this note with pt and/or family. I have discussed treatment plans and options and the risk/benefit analysis of those options, including safe use of medications and possible medication side effects. Through the use of shared decision making we have agreed to the above plan. The patient has received an after-visit summary and questions were answered concerning future plans and follow up. Advise pt of any urgent changes then to proceed to the ER.

## 2018-04-30 NOTE — MR AVS SNAPSHOT
[facility] 
 
 
 [address] [facility] [address] [id number] [phone] Patient: Terrell Marley MRN: [id number] :1970 Visit Information Date & Time Provider Department Dept. Phone Encounter #  
 2018  8:45 AM Vicente Chaidez MD [facility] [phone] [id number] Follow-up Instructions Return in about 3 months (around 2018). Upcoming Health Maintenance Date Due COLONOSCOPY 2018 Influenza Age 5 to Adult 2018 BREAST CANCER SCRN MAMMOGRAM 2019 PAP AKA CERVICAL CYTOLOGY 2020 DTaP/Tdap/Td series (2 - Td) 3/5/2024 Allergies as of 2018  Review Complete On: 2018 By: Leilani Daniel LPN Severity Noted Reaction Type Reactions Sulfa (Sulfonamide Antibiotics)  2018    Nausea Only Current Immunizations  Reviewed on 2018 Name Date Influenza Vaccine Deri Mulling) 2015 Influenza Vaccine (Quad) PF 2018, 2017 Tdap 3/5/2014 Reviewed by Vicente Chaidez MD on 2018 at 10:10 AM  
You Were Diagnosed With   
  
 Codes Comments Essential hypertension    -  Primary ICD-10-CM: I10 
ICD-9-CM: 401.9 Vitals BP Pulse Temp Resp Height(growth percentile) Weight(growth percentile) 138/88 84 98.1 °F (36.7 °C) (Oral) 20 5' 4\" (1.626 m) 186 lb 6.4 oz (84.6 kg) LMP SpO2 BMI OB Status Smoking Status 2018 (!) 20% 32 kg/m2 Having regular periods Never Smoker Vitals History BMI and BSA Data Body Mass Index Body Surface Area 32 kg/m 2 1.95 m 2 Preferred Pharmacy Pharmacy Name Phone [facility] [address], [address] [phone] Your Updated Medication List  
  
   
This list is accurate as of 18 10:15 AM.  Always use your most recent med list. amLODIPine 10 mg tablet Commonly known as:  Gwendolyn Vickie Take 1 Tab by mouth daily. CALCIUM-VITAMIN D PO Take 1 Tab by mouth daily. chlorthalidone 25 mg tablet Commonly known as:  Rosalba Clunes Take 0.5 Tabs by mouth daily. cyanocobalamin 1,000 mcg tablet Take 1,000 mcg by mouth daily. ferrous sulfate 325 mg (65 mg iron) tablet Take 325 mg by mouth Daily (before breakfast). HUMIRA PEN CROHN'S-UC-HS START 40 mg/0.8 mL injection pen Generic drug:  adalimumab 40 mg by SubCUTAneous route every fourteen (14) days. meclizine 12.5 mg tablet Commonly known as:  ANTIVERT Take 1 Tab by mouth three (3) times daily as needed. * PROTONIX 40 mg tablet Generic drug:  pantoprazole Take 40 mg by mouth as needed. * pantoprazole 40 mg tablet Commonly known as:  PROTONIX Take 1 Tab by mouth daily. PENTASA 500 mg CR capsule Generic drug:  mesalamine Take 1,000 mg by mouth three (3) times daily. PREVIDENT 5000 ENAMEL PROTECT 1.1-5 % Pste Generic drug:  sodium fluoride-pot nitrate APPLY TWO TIMES A DAY * Notice: This list has 2 medication(s) that are the same as other medications prescribed for you. Read the directions carefully, and ask your doctor or other care provider to review them with you. Prescriptions Sent to Pharmacy Refills  
 chlorthalidone (HYGROTEN) 25 mg tablet 3 Sig: Take 0.5 Tabs by mouth daily. Class: Normal  
 [facility] [address] [facility] #: [phone] Route: Oral  
  
Follow-up Instructions Return in about 3 months (around 7/30/2018). Introducing [facility]! Dear Liv Chand: 
Thank you for requesting a [employer] account. Our records indicate that you already have an active [employer] account. You can access your account anytime at [email or web address] Did you know that you can access your hospital and ER discharge instructions at any time in [employer]?   You can also review all of your test results from your hospital stay or ER visit. Additional Information If you have questions, please visit the Frequently Asked Questions section of the [employer] website at [email or web address] Remember, [employer] is NOT to be used for urgent needs. For medical emergencies, dial 911. Now available from your iPhone and Android! Please provide this summary of care documentation to your next provider. Your primary care clinician is listed as Samina Dueñas. If you have any questions after today's visit, please call [phone].

## 2018-08-01 ENCOUNTER — OFFICE VISIT (OUTPATIENT)
Dept: FAMILY MEDICINE CLINIC | Age: 48
End: 2018-08-01

## 2018-08-01 VITALS
HEART RATE: 96 BPM | HEIGHT: 64 IN | SYSTOLIC BLOOD PRESSURE: 137 MMHG | TEMPERATURE: 98.2 F | OXYGEN SATURATION: 98 % | DIASTOLIC BLOOD PRESSURE: 94 MMHG | BODY MASS INDEX: 31.72 KG/M2 | WEIGHT: 185.8 LBS | RESPIRATION RATE: 16 BRPM

## 2018-08-01 DIAGNOSIS — Z51.81 ENCOUNTER FOR MEDICATION MONITORING: ICD-10-CM

## 2018-08-01 DIAGNOSIS — E55.9 HYPOVITAMINOSIS D: ICD-10-CM

## 2018-08-01 DIAGNOSIS — E66.9 NON MORBID OBESITY: ICD-10-CM

## 2018-08-01 DIAGNOSIS — K50.90 CROHN'S DISEASE WITHOUT COMPLICATION, UNSPECIFIED GASTROINTESTINAL TRACT LOCATION (HCC): ICD-10-CM

## 2018-08-01 DIAGNOSIS — R73.02 IGT (IMPAIRED GLUCOSE TOLERANCE): ICD-10-CM

## 2018-08-01 DIAGNOSIS — K21.9 GASTROESOPHAGEAL REFLUX DISEASE WITHOUT ESOPHAGITIS: ICD-10-CM

## 2018-08-01 DIAGNOSIS — I10 ESSENTIAL HYPERTENSION: Primary | ICD-10-CM

## 2018-08-01 RX ORDER — ERGOCALCIFEROL 1.25 MG/1
50000 CAPSULE ORAL
Qty: 5 CAP | Refills: 6 | Status: SHIPPED | OUTPATIENT
Start: 2018-08-01 | End: 2018-10-03 | Stop reason: ALTCHOICE

## 2018-08-01 RX ORDER — ESOMEPRAZOLE MAGNESIUM 40 MG/1
40 CAPSULE, DELAYED RELEASE ORAL DAILY
Qty: 30 CAP | Refills: 5 | Status: SHIPPED | OUTPATIENT
Start: 2018-08-01 | End: 2018-10-03

## 2018-08-01 RX ORDER — AMLODIPINE BESYLATE 10 MG/1
10 TABLET ORAL DAILY
Qty: 90 TAB | Refills: 1 | Status: SHIPPED | OUTPATIENT
Start: 2018-08-01 | End: 2019-10-23 | Stop reason: SDUPTHER

## 2018-08-01 RX ORDER — CHLORTHALIDONE 25 MG/1
25 TABLET ORAL DAILY
Qty: 90 TAB | Refills: 1 | Status: SHIPPED | OUTPATIENT
Start: 2018-08-01 | End: 2019-10-23 | Stop reason: SDUPTHER

## 2018-08-01 NOTE — MR AVS SNAPSHOT
[address] [facility] 
 
 
 [address] [facility] [id number] [phone] Patient: Du Miller MRN: [id number] :[id number] Visit Information Date & Time Provider Department Dept. Phone Encounter #  
 2018  9:00 AM Gerber Schulz Pablo [phone] [id number] Follow-up Instructions Return in about 2 months (around 10/1/2018). Upcoming Health Maintenance Date Due Influenza Age 5 to Adult 2018 BREAST CANCER SCRN MAMMOGRAM 2019 PAP AKA CERVICAL CYTOLOGY 2020 DTaP/Tdap/Td series (2 - Td) 3/5/2024 COLONOSCOPY 2027 Allergies as of 2018  Review Complete On: 2018 By: Linda Dumont RN Severity Noted Reaction Type Reactions Sulfa (Sulfonamide Antibiotics)  2018    Nausea Only Current Immunizations  Reviewed on 2018 Name Date Influenza Vaccine Debra Savory) 2015 Influenza Vaccine (Quad) PF 2018, 2017 Tdap 3/5/2014 Reviewed by Mariusz Gordon MD on 2018 at  9:23 AM  
You Were Diagnosed With   
  
 Codes Comments Encounter for medication monitoring    -  Primary ICD-10-CM: Z51.81 
ICD-9-CM: V58.83 IGT (impaired glucose tolerance)     ICD-10-CM: R73.02 
ICD-9-CM: 790.22 Essential hypertension     ICD-10-CM: I10 
ICD-9-CM: 401.9 Vitals BP Pulse Temp Resp Height(growth percentile) Weight(growth percentile) (!) 137/94 (BP 1 Location: Left arm, BP Patient Position: Sitting) 96 98.2 °F (36.8 °C) (Oral) 16 5' 4\" (1.626 m) 185 lb 12.8 oz (84.3 kg) LMP SpO2 BMI OB Status Smoking Status 2018 (Approximate) 98% 31.89 kg/m2 Having regular periods Never Smoker Vitals History BMI and BSA Data Body Mass Index Body Surface Area  
 31.89 kg/m 2 1.95 m 2 Preferred Pharmacy Pharmacy Name Phone Kristie Jarrell [address] [place] [address], [address] [place] [address] [phone] Your Updated Medication List  
  
   
This list is accurate as of 8/1/18  9:29 AM.  Always use your most recent med list. amLODIPine 10 mg tablet Commonly known as:  Nori Croon Take 1 Tab by mouth daily. CALCIUM-VITAMIN D PO Take 1 Tab by mouth daily. chlorthalidone 25 mg tablet Commonly known as:  Hyacinth Bolk Take 1 Tab by mouth daily. cyanocobalamin 1,000 mcg tablet Take 1,000 mcg by mouth daily. ergocalciferol 50,000 unit capsule Commonly known as:  VITAMIN D2 Take 1 Cap by mouth every seven (7) days. esomeprazole 40 mg capsule Commonly known as:  NexIUM Take 1 Cap by mouth daily. ferrous sulfate 325 mg (65 mg iron) tablet Take 325 mg by mouth Daily (before breakfast). HUMIRA PEN CROHN'S-UC-HS START 40 mg/0.8 mL injection pen Generic drug:  adalimumab 40 mg by SubCUTAneous route every fourteen (14) days. meclizine 12.5 mg tablet Commonly known as:  ANTIVERT Take 1 Tab by mouth three (3) times daily as needed. PREVIDENT 5000 ENAMEL PROTECT 1.1-5 % Pste Generic drug:  sodium fluoride-pot nitrate APPLY TWO TIMES A DAY Prescriptions Sent to Pharmacy Refills  
 esomeprazole (NEXIUM) 40 mg capsule 5 Sig: Take 1 Cap by mouth daily. Class: Normal  
 Pharmacy: [address] [facility] Ph #: [phone] Route: Oral  
 chlorthalidone (HYGROTEN) 25 mg tablet 1 Sig: Take 1 Tab by mouth daily. Class: Normal  
 Pharmacy: [address] [facility] Ph #: [phone] Route: Oral  
 amLODIPine (NORVASC) 10 mg tablet 1 Sig: Take 1 Tab by mouth daily. Class: Normal  
 Pharmacy: [address] [facility] Ph #: [phone] Route: Oral  
 ergocalciferol (VITAMIN D2) 50,000 unit capsule 6 Sig: Take 1 Cap by mouth every seven (7) days.   
 Class: Normal  
 Pharmacy: [address] [facility] #: [phone] Route: Oral  
  
Follow-up Instructions Return in about 2 months (around 10/1/2018). Introducing [facility] SERVICES! Dear Imelda Funes: 
Thank you for requesting a [employer] account. Our records indicate that you already have an active [employer] account. You can access your account anytime at [email or web address]. [email or web address] Did you know that you can access your hospital and ER discharge instructions at any time in [employer]? You can also review all of your test results from your hospital stay or ER visit. Additional Information If you have questions, please visit the Frequently Asked Questions section of the [employer] website at [email or web address]/. Remember, [employer] is NOT to be used for urgent needs. For medical emergencies, dial 911. Now available from your iPhone and Android! Please provide this summary of care documentation to your next provider. Your primary care clinician is listed as Baron Chapin. If you have any questions after today's visit, please call [phone].

## 2018-08-01 NOTE — PROGRESS NOTES
HISTORY OF PRESENT ILLNESS Jose Morillo is a 51 y.o. female. HPI Follow up on BP. Has hx of Crohn's disease being followed by GI. She is on Humira injections which seems to be working well for her. Cardiovascular Review: 
The patient has hypertension. Diet and Lifestyle: generally follows a low fat low cholesterol diet, generally follows a low sodium diet, exercises sporadically Home BP Monitoring: is not measured at home. Pertinent ROS: taking medications as instructed, no medication side effects noted, no TIA's, no chest pain on exertion, no dyspnea on exertion, no swelling of ankles, no palpitations. Glucose intolerance reveiw: She has IGT. Last a1c level was 5.8 in 1/18. Diabetic ROS - further diabetic ROS: no polyuria or polydipsia, no chest pain, dyspnea or TIA's, no numbness, tingling or pain in extremities, no unusual visual symptoms. Lab review: orders written for new lab studies as appropriate; see orders. Patient Active Problem List  
Diagnosis Code  Hypovitaminosis D E55.9  Herpes genitalia A60.00  Anemia D64.9  
 IGT (impaired glucose tolerance) R73.02  
 Crohn's disease without complication ([facility] Z78.69  
 Encounter for medication monitoring Z51.81  
 Non morbid obesity E66.9 Current Outpatient Prescriptions Medication Sig Dispense Refill  esomeprazole (NEXIUM) 40 mg capsule Take 1 Cap by mouth daily. 30 Cap 5  chlorthalidone (HYGROTEN) 25 mg tablet Take 1 Tab by mouth daily. 90 Tab 1  
 amLODIPine (NORVASC) 10 mg tablet Take 1 Tab by mouth daily. 90 Tab 1  
 ergocalciferol (VITAMIN D2) 50,000 unit capsule Take 1 Cap by mouth every seven (7) days. 5 Cap 6  
 HUMIRA PEN CROHN'S-UC-HS START 40 mg/0.8 mL injection pen 40 mg by SubCUTAneous route every fourteen (14) days.  meclizine (ANTIVERT) 12.5 mg tablet Take 1 Tab by mouth three (3) times daily as needed.  30 Tab 0  
 CALCIUM CARB/VIT D3/MINERALS (CALCIUM-VITAMIN D PO) Take 1 Tab by mouth daily.    
 cyanocobalamin 1,000 mcg tablet Take 1,000 mcg by mouth daily.  ferrous sulfate 325 mg (65 mg iron) tablet Take 325 mg by mouth Daily (before breakfast).  PREVIDENT 5000 ENAMEL PROTECT 1.1-5 % pste APPLY TWO TIMES A DAY  3 Allergies Allergen Reactions  Sulfa (Sulfonamide Antibiotics) Nausea Only Past Medical History:  
Diagnosis Date  Crohn disease ([address]   
 5/2017  Environmental allergies  GERD (gastroesophageal reflux disease)  Herpes genitalia Past Surgical History:  
Procedure Laterality Date [address]- HPV Family History Problem Relation Age of Onset  Heart Disease Mother  Bleeding Prob Mother   
  was on coumadin  Stroke Father  Cancer Sister   
  lymphoma  Diabetes Brother  Schizophrenia Brother  Diabetes Sister  Diabetes Sister  Mental Retardation Sister  No Known Problems Sister  Other Brother   
  paralysis from car accident  Heart Disease Brother  Pacemaker Brother Social History Substance Use Topics  Smoking status: Never Smoker  Smokeless tobacco: Never Used  Alcohol use Yes Comment: occasional  
 
  
Lab Results Component Value Date/Time WBC 9.0 01/23/2017 03:12 PM  
HGB 9.6 (L) 01/23/2017 03:12 PM  
HCT 31.1 (L) 01/23/2017 03:12 PM  
PLATELET 623 01/10/4428 03:12 PM  
MCV 71 (L) 01/23/2017 03:12 PM  
 
Lab Results Component Value Date/Time Cholesterol, total 180 01/31/2018 11:11 AM  
HDL Cholesterol 44 01/31/2018 11:11 AM  
LDL, calculated 117 (H) 01/31/2018 11:11 AM  
Triglyceride 93 01/31/2018 11:11 AM  
 
Lab Results Component Value Date/Time TSH 1.250 09/02/2015 10:21 AM  
  
Lab Results Component Value Date/Time  Sodium 139 01/31/2018 11:11 AM  
 Potassium 4.3 01/31/2018 11:11 AM  
 Chloride 99 01/31/2018 11:11 AM  
 CO2 23 01/31/2018 11:11 AM  
 Glucose 99 01/31/2018 11:11 AM  
 BUN 9 01/31/2018 11:11 AM  
 Creatinine 0.79 01/31/2018 11:11 AM  
 BUN/Creatinine ratio 11 01/31/2018 11:11 AM  
 GFR est  01/31/2018 11:11 AM  
 GFR est non-AA 89 01/31/2018 11:11 AM  
 Calcium 9.1 01/31/2018 11:11 AM  
 Bilirubin, total 0.2 01/31/2018 11:11 AM  
 ALT (SGPT) 12 01/31/2018 11:11 AM  
 AST (SGOT) 16 01/31/2018 11:11 AM  
 Alk. phosphatase 85 01/31/2018 11:11 AM  
 Protein, total 6.6 01/31/2018 11:11 AM  
 Albumin 4.0 01/31/2018 11:11 AM  
 A-G Ratio 1.5 01/31/2018 11:11 AM  
  
Lab Results Component Value Date/Time Hemoglobin A1c 6.4 (H) 03/05/2014 12:58 PM  
 Hemoglobin A1c (POC) 5.8 01/31/2018 11:11 AM  
   
 
Review of Systems Constitutional: Negative for malaise/fatigue. HENT: Negative for congestion. Eyes: Negative for blurred vision. Respiratory: Negative for cough and shortness of breath. Cardiovascular: Negative for chest pain, palpitations and leg swelling. Gastrointestinal: Positive for nausea (has had some nauses just this morning. ). Negative for abdominal pain, constipation and heartburn. Genitourinary: Negative for dysuria, frequency and urgency. Musculoskeletal: Negative for back pain and joint pain. Neurological: Negative for dizziness, tingling and headaches. Endo/Heme/Allergies: Negative for environmental allergies. Psychiatric/Behavioral: Negative for depression. The patient does not have insomnia. Physical Exam  
Constitutional: She appears well-developed and well-nourished. BP (!) 137/94 (BP 1 Location: Left arm, BP Patient Position: Sitting)  Pulse 96  Temp 98.2 °F (36.8 °C) (Oral)   Resp 16  Ht 5' 4\" (1.626 m)  Wt 185 lb 12.8 oz (84.3 kg)  LMP 05/28/2018 (Approximate)  SpO2 98%  BMI 31.89 kg/m2 HENT:  
Right Ear: Tympanic membrane and ear canal normal.  
Left Ear: Tympanic membrane and ear canal normal.  
Nose: No mucosal edema or rhinorrhea. Mouth/Throat: Oropharynx is clear and moist and mucous membranes are normal.  
Neck: Normal range of motion. Neck supple.  No thyromegaly present. Cardiovascular: Normal rate and regular rhythm. No murmur heard. Pulmonary/Chest: Effort normal and breath sounds normal.  
Abdominal: Soft. Bowel sounds are normal. There is no tenderness. Musculoskeletal: Normal range of motion. She exhibits no edema. Lymphadenopathy:  
  She has no cervical adenopathy. Skin: Skin is warm and dry. Psychiatric: She has a normal mood and affect. Nursing note and vitals reviewed. ASSESSMENT and PLAN Diagnoses and all orders for this visit: 1. Essential hypertension -    Increase chlorthalidone (HYGROTEN) 25 mg tablet; Take 1 Tab by mouth daily. -     amLODIPine (NORVASC) 10 mg tablet; Take 1 Tab by mouth daily. Discussed sodium restriction, high k rich diet, maintaining ideal body weight and regular exercise program such as daily walking 30 min perday 4-5 times per week, as physiologic means to achieve blood pressure control.  Medication compliance advised. 2. IGT (impaired glucose tolerance) Continue to monitor. Work on diet and exercise. 3. Crohn's disease without complication, unspecified gastrointestinal tract location ([facility] Stable. Follow up as per GI. 4. Gastroesophageal reflux disease without esophagitis -     Change to esomeprazole (NEXIUM) 40 mg capsule; Take 1 Cap by mouth daily d/t formulary change Light diet and ginger ale for nausea she is having this morning. 5. Hypovitaminosis D 
-     ergocalciferol (VITAMIN D2) 50,000 unit capsule; Take 1 Cap by mouth every seven (7) days. 6. Encounter for medication monitoring 7. Non morbid obesity Counseled on goal for exercise of eventual goal of 30-60 minutes 5-7 times a week as per AHA guidelines. Decrease carbohydrates (white foods, sweet foods, sweet drinks and alcohol), increase green leafy vegetables and protein (lean meats and beans). Avoid fried foods. Increase water intake. Eat 3-5 small meals daily.  Increase physical activity. Follow-up Disposition: 
Return in about 2 months (around 10/1/2018). reviewed diet, exercise and weight control 
cardiovascular risk and specific lipid/LDL goals reviewed 
reviewed medications and side effects in detail I have discussed diagnosis listed in this note with pt and/or family. I have discussed treatment plans and options and the risk/benefit analysis of those options, including safe use of medications and possible medication side effects. Through the use of shared decision making we have agreed to the above plan. The patient has received an after-visit summary and questions were answered concerning future plans and follow up. Advise pt of any urgent changes then to proceed to the ER.

## 2018-08-01 NOTE — PROGRESS NOTES
Reviewed record in preparation for visit and have obtained necessary documentation. Identified pt with two pt identifiers(name and ). Chief Complaint Patient presents with  Hypertension  
  f/up Vitals:  
 18 7749 BP: (!) 137/94 Pulse: 96  
Resp: 16 Temp: 98.2 °F (36.8 °C) TempSrc: Oral  
SpO2: 98% Weight: 185 lb 12.8 oz (84.3 kg) Height: 5' 4\" (1.626 m) PainSc:   0 - No pain LMP: 2018 Coordination of Care Questionnaire: 
:  
 
1) Have you been to an emergency room, urgent care clinic since your last visit? no  
Hospitalized since your last visit? no          
 
2) Have you seen or consulted any other health care providers outside of [address] [place] [address] since your last visit? no  (Include any pap smears or colon screenings in this section.) Health Maintenance Due Topic Date Due  Influenza Age 5 to Adult  2018 Candy Lucio RN

## 2018-10-02 NOTE — PROGRESS NOTES
HISTORY OF PRESENT ILLNESS Star Miguel Angel is a 51 y.o. female. HPI Follow up on BP. Has hx of Crohn's disease being followed by GI. She is on Humira injections which seems to be working well for her. No recent GI sx. Cardiovascular Review: 
The patient has hypertension. Diet and Lifestyle: generally follows a low fat low cholesterol diet, generally follows a low sodium diet, exercises sporadically Home BP Monitoring: is not measured at home. Pertinent ROS: taking medications as instructed, no medication side effects noted, no TIA's, no chest pain on exertion, no dyspnea on exertion, no swelling of ankles, no palpitations. Glucose intolerance reveiw: She has IGT. Last a1c level was 5.8 in 1/18. Diabetic ROS - further diabetic ROS: no polyuria or polydipsia, no chest pain, dyspnea or TIA's, no numbness, tingling or pain in extremities, no unusual visual symptoms. Lab review: orders written for new lab studies as appropriate; see orders. Patient Active Problem List  
Diagnosis Code  Hypovitaminosis D E55.9  Herpes genitalia A60.00  Anemia D64.9  
 IGT (impaired glucose tolerance) R73.02  
 [facility] D51.86  
 Encounter for medication monitoring Z51.81  
 Non morbid obesity E66.9 Current Outpatient Prescriptions Medication Sig Dispense Refill  esomeprazole (NEXIUM) 40 mg capsule Take 1 Cap by mouth daily. 30 Cap 5  chlorthalidone (HYGROTEN) 25 mg tablet Take 1 Tab by mouth daily. 90 Tab 1  
 amLODIPine (NORVASC) 10 mg tablet Take 1 Tab by mouth daily. 90 Tab 1  
 ergocalciferol (VITAMIN D2) 50,000 unit capsule Take 1 Cap by mouth every seven (7) days. 5 Cap 6  
 HUMIRA PEN CROHN'S-UC-HS START 40 mg/0.8 mL injection pen 40 mg by SubCUTAneous route every fourteen (14) days.  meclizine (ANTIVERT) 12.5 mg tablet Take 1 Tab by mouth three (3) times daily as needed.  30 Tab 0  
  CALCIUM CARB/VIT D3/MINERALS (CALCIUM-VITAMIN D PO) Take 1 Tab by mouth daily.  cyanocobalamin 1,000 mcg tablet Take 1,000 mcg by mouth daily.  ferrous sulfate 325 mg (65 mg iron) tablet Take 325 mg by mouth Daily (before breakfast).  PREVIDENT 5000 ENAMEL PROTECT 1.1-5 % pste APPLY TWO TIMES A DAY  3 Allergies Allergen Reactions  Sulfa (Sulfonamide Antibiotics) Nausea Only Past Medical History:  
Diagnosis Date  Crohn disease ([address]   
 5/2017  Environmental allergies  GERD (gastroesophageal reflux disease)  Herpes genitalia Past Surgical History:  
Procedure Laterality Date [address] Laser- HPV Family History Problem Relation Age of Onset  Heart Disease Mother  Bleeding Prob Mother   
  was on coumadin  Stroke Father  Cancer Sister   
  lymphoma  Diabetes Brother  Schizophrenia Brother  Diabetes Sister  Diabetes Sister  Mental Retardation Sister  No Known Problems Sister  Other Brother   
  paralysis from car accident  Heart Disease Brother  Pacemaker Brother Social History Substance Use Topics  Smoking status: Never Smoker  Smokeless tobacco: Never Used  Alcohol use Yes Comment: occasional  
 
 
  
Lab Results Component Value Date/Time WBC 9.0 01/23/2017 03:12 PM  
HGB 9.6 (L) 01/23/2017 03:12 PM  
HCT 31.1 (L) 01/23/2017 03:12 PM  
PLATELET 029 87/34/4554 03:12 PM  
MCV 71 (L) 01/23/2017 03:12 PM  
 
Lab Results Component Value Date/Time Cholesterol, total 180 01/31/2018 11:11 AM  
HDL Cholesterol 44 01/31/2018 11:11 AM  
LDL, calculated 117 (H) 01/31/2018 11:11 AM  
Triglyceride 93 01/31/2018 11:11 AM  
 
Lab Results Component Value Date/Time TSH 1.250 09/02/2015 10:21 AM  
  
Lab Results Component Value Date/Time  Sodium 139 01/31/2018 11:11 AM  
 Potassium 4.3 01/31/2018 11:11 AM  
 Chloride 99 01/31/2018 11:11 AM  
 CO2 23 01/31/2018 11:11 AM  
 Glucose 99 01/31/2018 11:11 AM  
 BUN 9 01/31/2018 11:11 AM  
 Creatinine 0.79 01/31/2018 11:11 AM  
 BUN/Creatinine ratio 11 01/31/2018 11:11 AM  
 GFR est  01/31/2018 11:11 AM  
 GFR est non-AA 89 01/31/2018 11:11 AM  
 Calcium 9.1 01/31/2018 11:11 AM  
 Bilirubin, total 0.2 01/31/2018 11:11 AM  
 ALT (SGPT) 12 01/31/2018 11:11 AM  
 AST (SGOT) 16 01/31/2018 11:11 AM  
 Alk. phosphatase 85 01/31/2018 11:11 AM  
 Protein, total 6.6 01/31/2018 11:11 AM  
 Albumin 4.0 01/31/2018 11:11 AM  
 A-G Ratio 1.5 01/31/2018 11:11 AM  
  
Lab Results Component Value Date/Time Hemoglobin A1c 6.4 (H) 03/05/2014 12:58 PM  
 Hemoglobin A1c (POC) 5.8 01/31/2018 11:11 AM  
   
 
Review of Systems Constitutional: Negative for malaise/fatigue. HENT: Negative for congestion. Eyes: Negative for blurred vision. Respiratory: Negative for cough and shortness of breath. Cardiovascular: Negative for chest pain, palpitations and leg swelling. Gastrointestinal: Negative for abdominal pain, constipation and heartburn. Genitourinary: Negative for dysuria, frequency and urgency. Musculoskeletal: Negative for back pain and joint pain. Neurological: Negative for dizziness, tingling and headaches. Endo/Heme/Allergies: Negative for environmental allergies. Psychiatric/Behavioral: Negative for depression. The patient does not have insomnia. Physical Exam  
Constitutional: She appears well-developed and well-nourished. /80 (BP 1 Location: Right arm, BP Patient Position: Sitting)  Pulse 89  Temp 98.5 °F (36.9 °C) (Oral)   Resp 16  Ht 5' 4\" (1.626 m)  Wt 183 lb 6.4 oz (83.2 kg)  LMP 09/12/2018  SpO2 96%  BMI 31.48 kg/m2 HENT:  
Right Ear: Tympanic membrane and ear canal normal.  
Left Ear: Tympanic membrane and ear canal normal.  
Nose: No mucosal edema or rhinorrhea.   
Mouth/Throat: Oropharynx is clear and moist and mucous membranes are normal.  
 Neck: Normal range of motion. Neck supple. No thyromegaly present. Cardiovascular: Normal rate, regular rhythm and normal heart sounds. Pulmonary/Chest: Effort normal and breath sounds normal.  
Abdominal: Soft. Normal appearance and bowel sounds are normal. She exhibits no mass. There is no tenderness. Musculoskeletal: Normal range of motion. She exhibits mild edema in the lateral ankles. Lymphadenopathy:  
  She has no cervical adenopathy. Skin: Skin is warm and dry. Psychiatric: She has a normal mood and affect. Nursing note and vitals reviewed. ASSESSMENT and PLAN Diagnoses and all orders for this visit: 1. Crohn's disease without complication, unspecified gastrointestinal tract location [facility]) Stable on Humira. Continue follow up with GI.   
 
2. Gastroesophageal reflux disease, esophagitis presence not specified -     Refill pantoprazole (PROTONIX) 40 mg tablet; Take 1 Tab by mouth daily as needed. (nexium no longer covered) 3. IGT (impaired glucose tolerance) -     AMB POC HEMOGLOBIN A1C 
 
4. Hypovitaminosis D 
-     VITAMIN D, 25 HYDROXY 5. Encounter for medication monitoring -     METABOLIC PANEL, BASIC 6. Non morbid obesity I have reviewed/discussed the above normal BMI with the patient. I have recommended the following interventions: dietary management education, guidance, and counseling . Follow-up Disposition: 
Return in about 6 months (around 4/3/2019) for physical. 
reviewed diet, exercise and weight control 
cardiovascular risk and specific lipid/LDL goals reviewed 
reviewed medications and side effects in detail 
specific diabetic recommendations: low cholesterol diet, weight control and daily exercise discussed and glycohemoglobin and other lab monitoring discussed I have discussed diagnosis listed in this note with pt and/or family.  I have discussed treatment plans and options and the risk/benefit analysis of those options, including safe use of medications and possible medication side effects. Through the use of shared decision making we have agreed to the above plan. The patient has received an after-visit summary and questions were answered concerning future plans and follow up. Advise pt of any urgent changes then to proceed to the ER.

## 2018-10-03 ENCOUNTER — OFFICE VISIT (OUTPATIENT)
Dept: FAMILY MEDICINE CLINIC | Age: 48
End: 2018-10-03

## 2018-10-03 VITALS
HEIGHT: 64 IN | RESPIRATION RATE: 16 BRPM | WEIGHT: 183.4 LBS | BODY MASS INDEX: 31.31 KG/M2 | HEART RATE: 89 BPM | DIASTOLIC BLOOD PRESSURE: 80 MMHG | TEMPERATURE: 98.5 F | OXYGEN SATURATION: 96 % | SYSTOLIC BLOOD PRESSURE: 110 MMHG

## 2018-10-03 DIAGNOSIS — E66.9 NON MORBID OBESITY: ICD-10-CM

## 2018-10-03 DIAGNOSIS — Z23 ENCOUNTER FOR IMMUNIZATION: ICD-10-CM

## 2018-10-03 DIAGNOSIS — K21.9 GASTROESOPHAGEAL REFLUX DISEASE, ESOPHAGITIS PRESENCE NOT SPECIFIED: Primary | ICD-10-CM

## 2018-10-03 DIAGNOSIS — K50.90 CROHN'S DISEASE WITHOUT COMPLICATION, UNSPECIFIED GASTROINTESTINAL TRACT LOCATION (HCC): ICD-10-CM

## 2018-10-03 DIAGNOSIS — R73.02 IGT (IMPAIRED GLUCOSE TOLERANCE): ICD-10-CM

## 2018-10-03 DIAGNOSIS — E55.9 HYPOVITAMINOSIS D: ICD-10-CM

## 2018-10-03 DIAGNOSIS — Z51.81 ENCOUNTER FOR MEDICATION MONITORING: ICD-10-CM

## 2018-10-03 LAB
BACTERIA UA POCT, BACTPOCT: NORMAL
BILIRUB UR QL STRIP: NEGATIVE
CASTS UA POCT: NORMAL
CLUE CELLS, CLUEPOCT: NORMAL
CRYSTALS UA POCT, CRYSPOCT: NORMAL
EPITHELIAL CELLS POCT: NORMAL
GLUCOSE UR-MCNC: NEGATIVE MG/DL
HBA1C MFR BLD HPLC: 6.2 %
KETONES P FAST UR STRIP-MCNC: NEGATIVE MG/DL
MUCUS UA POCT, MUCPOCT: NORMAL
PH UR STRIP: 5.5 [PH] (ref 4.6–8)
PROT UR QL STRIP: NEGATIVE
RBC UA POCT, RBCPOCT: NORMAL
SP GR UR STRIP: 1.01 (ref 1–1.03)
TRICH UA POCT, TRICHPOC: NORMAL
UA UROBILINOGEN AMB POC: NORMAL (ref 0.2–1)
URINALYSIS CLARITY POC: CLEAR
URINALYSIS COLOR POC: YELLOW
URINE BLOOD POC: NEGATIVE
URINE CULT COMMENT, POCT: NORMAL
URINE LEUKOCYTES POC: NEGATIVE
URINE NITRITES POC: NEGATIVE
WBC UA POCT, WBCPOCT: NORMAL
YEAST UA POCT, YEASTPOC: NORMAL

## 2018-10-03 RX ORDER — PANTOPRAZOLE SODIUM 40 MG/1
40 TABLET, DELAYED RELEASE ORAL
Qty: 30 TAB | Refills: 3 | Status: SHIPPED | OUTPATIENT
Start: 2018-10-03 | End: 2019-10-23 | Stop reason: SDUPTHER

## 2018-10-03 RX ORDER — PANTOPRAZOLE SODIUM 40 MG/1
TABLET, DELAYED RELEASE ORAL
COMMUNITY
End: 2018-10-03

## 2018-10-03 RX ORDER — AMLODIPINE BESYLATE 10 MG/1
TABLET ORAL
COMMUNITY
End: 2018-10-03 | Stop reason: SDUPTHER

## 2018-10-03 RX ORDER — ERGOCALCIFEROL 1.25 MG/1
CAPSULE ORAL
COMMUNITY
End: 2018-10-03 | Stop reason: SDUPTHER

## 2018-10-03 RX ORDER — MECLIZINE HCL 12.5 MG 12.5 MG/1
TABLET ORAL
COMMUNITY
End: 2018-10-03 | Stop reason: SDUPTHER

## 2018-10-03 RX ORDER — MESALAMINE 500 MG/1
CAPSULE, EXTENDED RELEASE ORAL
COMMUNITY
End: 2018-10-03

## 2018-10-03 RX ORDER — CHLORTHALIDONE 25 MG/1
TABLET ORAL
COMMUNITY
End: 2018-10-03 | Stop reason: SDUPTHER

## 2018-10-03 NOTE — PROGRESS NOTES
Chief Complaint Patient presents with  Hypertension  
  follow up Mammogram 2/28/2018 1. Have you been to the ER, urgent care clinic since your last visit? Hospitalized since your last visit? No 
 
2. Have you seen or consulted any other health care providers outside of the [address] [place] [address] since your last visit? Include any pap smears or colon screening. No 
 
Verbal order received per Dr. Rivera- flu vaccine IM- Kailee Ennis Pt received flu vaccine IM in left deltoid without any difficulty. Verbal order received per Dr. Aj Astudillo 23 IM for need for immunization-VORB. Pt received Pneumococcal 23 IM in right deltoid without any difficulty.

## 2018-10-03 NOTE — MR AVS SNAPSHOT
Juan Carlos Woods 
 
 
 [address] [facility] [address] 
[phone] Patient: Richard Gonzalez MRN: [id number] :1970 Visit Information Date & Time Provider Department Dept. Phone Encounter #  
 10/3/2018  3:00 PM Nav Carmona MD [facility] [phone] [id number] Follow-up Instructions Return in about 6 months (around 4/3/2019) for physical.  
  
Upcoming Health Maintenance Date Due Influenza Age 5 to Adult 2018 BREAST CANCER SCRN MAMMOGRAM 2019 PAP AKA CERVICAL CYTOLOGY 2020 DTaP/Tdap/Td series (2 - Td) 3/5/2024 COLONOSCOPY 2027 Allergies as of 10/3/2018  Review Complete On: 10/3/2018 By: Nav Carmona MD  
  
 Severity Noted Reaction Type Reactions Ibuprofen    Other (comments) Can't take due to Crohns Sulfa (Sulfonamide Antibiotics)  2018    Nausea Only Current Immunizations  Reviewed on 10/3/2018 Name Date Influenza Vaccine Mati Session) 2015 Influenza Vaccine (Quad) PF 2018, 2017 Tdap 3/5/2014 Reviewed by Nav Carmona MD on 10/3/2018 at  3:21 PM  
You Were Diagnosed With   
  
 Codes Comments Crohn's disease without complication, unspecified gastrointestinal tract location [facility])    -  Primary ICD-10-CM: K50.90 ICD-9-CM: 555.9 IGT (impaired glucose tolerance)     ICD-10-CM: R73.02 
ICD-9-CM: 790.22 Hypovitaminosis D     ICD-10-CM: E55.9 ICD-9-CM: 268.9 Encounter for medication monitoring     ICD-10-CM: Z51.81 
ICD-9-CM: V58.83 Non morbid obesity     ICD-10-CM: E66.9 ICD-9-CM: 278.00 Vitals BP Pulse Temp Resp Height(growth percentile) Weight(growth percentile) 110/80 (BP 1 Location: Right arm, BP Patient Position: Sitting) 89 98.5 °F (36.9 °C) (Oral) 16 5' 4\" (1.626 m) 183 lb 6.4 oz (83.2 kg) LMP SpO2 BMI OB Status Smoking Status 09/12/2018 96% 31.48 kg/m2 Having regular periods Never Smoker BMI and BSA Data Body Mass Index Body Surface Area  
 31.48 kg/m 2 1.94 m 2 Preferred Pharmacy Pharmacy Name Phone [facility] [address], [address] [place] [address] Avenue [phone] Your Updated Medication List  
  
   
This list is accurate as of 10/3/18  3:37 PM.  Always use your most recent med list. amLODIPine 10 mg tablet Commonly known as:  Chavarria Pee Take 1 Tab by mouth daily. chlorthalidone 25 mg tablet Commonly known as:  Alexandra Keens Take 1 Tab by mouth daily. cyanocobalamin 1,000 mcg tablet Take 1,000 mcg by mouth daily. ferrous sulfate 325 mg (65 mg iron) tablet Take 325 mg by mouth Daily (before breakfast). HUMIRA PEN 40 mg/0.8 mL injection pen Generic drug:  adalimumab Humira Pen 40 mg/0.8 mL subcutaneous every other week  
  
 meclizine 12.5 mg tablet Commonly known as:  ANTIVERT Take 1 Tab by mouth three (3) times daily as needed. pantoprazole 40 mg tablet Commonly known as:  PROTONIX Take 1 Tab by mouth daily as needed. PREVIDENT 5000 ENAMEL PROTECT 1.1-5 % Pste Generic drug:  sodium fluoride-pot nitrate APPLY TWO TIMES A DAY Prescriptions Sent to Pharmacy Refills  
 pantoprazole (PROTONIX) 40 mg tablet 3 Sig: Take 1 Tab by mouth daily as needed. Class: Normal  
 Pharmacy: [facility]il[facility], [address] [facility] #: [phone] Route: Oral  
  
We Performed the Following AMB POC HEMOGLOBIN A1C [29496 CPT(R)] METABOLIC PANEL, BASIC [74913 CPT(R)] VITAMIN D, 25 HYDROXY [id number] CPT(R)] Follow-up Instructions Return in about 6 months (around 4/3/2019) for physical.  
  
  
Introducing [facility]! Dear Triston Rivera: 
Thank you for requesting a [email or web address] account. Our records indicate that you already have an active [email or web address] account.   You can access your account anytime at [email or web address] Did you know that you can access your hospital and ER discharge instructions at any time in [employer]? You can also review all of your test results from your hospital stay or ER visit. Additional Information If you have questions, please visit the Frequently Asked Questions section of the [employer] website at [email or web address] Remember, [employer] is NOT to be used for urgent needs. For medical emergencies, dial 911. Now available from your iPhone and Android! Please provide this summary of care documentation to your next provider. Your primary care clinician is listed as Nav Carmona. If you have any questions after today's visit, please call [phone].

## 2018-10-04 LAB
25(OH)D3+25(OH)D2 SERPL-MCNC: 34.7 NG/ML (ref 30–100)
BUN SERPL-MCNC: 9 MG/DL (ref 6–24)
BUN/CREAT SERPL: 11 (ref 9–23)
CALCIUM SERPL-MCNC: 9.8 MG/DL (ref 8.7–10.2)
CHLORIDE SERPL-SCNC: 96 MMOL/L (ref 96–106)
CO2 SERPL-SCNC: 24 MMOL/L (ref 20–29)
CREAT SERPL-MCNC: 0.85 MG/DL (ref 0.57–1)
GLUCOSE SERPL-MCNC: 100 MG/DL (ref 65–99)
POTASSIUM SERPL-SCNC: 3.6 MMOL/L (ref 3.5–5.2)
SODIUM SERPL-SCNC: 136 MMOL/L (ref 134–144)

## 2019-02-20 ENCOUNTER — TELEPHONE (OUTPATIENT)
Dept: FAMILY MEDICINE CLINIC | Age: 49
End: 2019-02-20

## 2019-02-20 NOTE — TELEPHONE ENCOUNTER
----- Message from Fabricio Tan sent at 2/19/2019  5:41 PM EST -----  Regarding: Dr. Russell Riggs  Pt is unable to make appointment on 4/03 at 2:00 pm and rescheduled for 6/07 at 9:15am. Best contact number is [phone].

## 2019-03-14 ENCOUNTER — HOSPITAL ENCOUNTER (OUTPATIENT)
Dept: MAMMOGRAPHY | Age: 49
Discharge: HOME | End: 2019-03-14
Attending: FAMILY MEDICINE
Payer: COMMERCIAL

## 2019-03-14 DIAGNOSIS — Z12.39 SCREENING BREAST EXAMINATION: ICD-10-CM

## 2019-03-14 PROCEDURE — 77067 SCR MAMMO BI INCL CAD: CPT

## 2019-04-24 ENCOUNTER — OFFICE VISIT (OUTPATIENT)
Dept: FAMILY MEDICINE CLINIC | Age: 49
End: 2019-04-24

## 2019-04-24 VITALS
OXYGEN SATURATION: 96 % | TEMPERATURE: 98.8 F | DIASTOLIC BLOOD PRESSURE: 83 MMHG | SYSTOLIC BLOOD PRESSURE: 115 MMHG | HEART RATE: 92 BPM | WEIGHT: 177.2 LBS | RESPIRATION RATE: 16 BRPM | HEIGHT: 64 IN | BODY MASS INDEX: 30.25 KG/M2

## 2019-04-24 DIAGNOSIS — Z51.81 ENCOUNTER FOR MEDICATION MONITORING: ICD-10-CM

## 2019-04-24 DIAGNOSIS — K50.90 CROHN'S DISEASE WITHOUT COMPLICATION, UNSPECIFIED GASTROINTESTINAL TRACT LOCATION (HCC): ICD-10-CM

## 2019-04-24 DIAGNOSIS — Z00.00 ROUTINE GENERAL MEDICAL EXAMINATION AT A HEALTH CARE FACILITY: Primary | ICD-10-CM

## 2019-04-24 DIAGNOSIS — E66.9 NON MORBID OBESITY: ICD-10-CM

## 2019-04-24 DIAGNOSIS — I10 ESSENTIAL HYPERTENSION: ICD-10-CM

## 2019-04-24 DIAGNOSIS — R73.02 IGT (IMPAIRED GLUCOSE TOLERANCE): ICD-10-CM

## 2019-04-24 LAB
GLUCOSE POC: 112 MG/DL
HBA1C MFR BLD HPLC: 5.8 %

## 2019-04-24 RX ORDER — CHOLECALCIFEROL (VITAMIN D3) 125 MCG
1 CAPSULE ORAL DAILY
COMMUNITY

## 2019-04-24 RX ORDER — DIAPER,BRIEF,INFANT-TODD,DISP
1 EACH MISCELLANEOUS DAILY
COMMUNITY
End: 2020-04-28 | Stop reason: ALTCHOICE

## 2019-04-24 NOTE — PROGRESS NOTES
Subjective:  
51 y.o. female for Well Woman Check. Patient's last menstrual period was 04/22/2019. Pap and breast exam done by GYN. Cardiovascular Review: 
The patient has hypertension and obesity. Diet and Lifestyle: generally follows a low fat low cholesterol diet, generally follows a low sodium diet, exercises sporadically Home BP Monitoring: is not measured at home. Pertinent ROS: taking medications as instructed, no medication side effects noted, no TIA's, no chest pain on exertion, no dyspnea on exertion, no swelling of ankles, no palpitations. Glucose intolerance reveiw: She has IGT. Last a1c level was 6.2 in 10/18. She has been working on her diet and weight is down by about 8-9 pounds. Diabetic ROS - further diabetic ROS: no polyuria or polydipsia, no chest pain, dyspnea or TIA's, no numbness, tingling or pain in extremities, no unusual visual symptoms. Lab review: orders written for new lab studies as appropriate; see orders. Has hx of Crohn's disease being followed by GI. She is on Humira injections which seems to be working well for her. HM: 
Pap and pelvic by Dr. Hunter Bird 2/2019 Mammogram 3/14/2019 Colonoscopy 05/16/2017 Dr. Agnes Suarez repeat in 10 years Patient Active Problem List  
Diagnosis Code  Hypovitaminosis D E55.9  Herpes genitalia A60.00  Anemia D64.9  
 IGT (impaired glucose tolerance) R73.02  
 [facility] Q58.86  
 Encounter for medication monitoring Z51.81  
 Non morbid obesity E66.9 Current Outpatient Medications Medication Sig Dispense Refill  cholecalciferol, vitamin D3, (VITAMIN D3) 2,000 unit tab Take 1 Tab by mouth daily.  biotin 10,000 mcg cap Take 1 Tab by mouth daily.  adalimumab (HUMIRA PEN) 40 mg/0.8 mL injection pen Humira Pen 40 mg/0.8 mL subcutaneous every other week  pantoprazole (PROTONIX) 40 mg tablet Take 1 Tab by mouth daily as needed.  30 Tab 3  
  chlorthalidone (HYGROTEN) 25 mg tablet Take 1 Tab by mouth daily. 90 Tab 1  
 amLODIPine (NORVASC) 10 mg tablet Take 1 Tab by mouth daily. 90 Tab 1  
 meclizine (ANTIVERT) 12.5 mg tablet Take 1 Tab by mouth three (3) times daily as needed. 30 Tab 0  
 cyanocobalamin 1,000 mcg tablet Take 1,000 mcg by mouth daily.  ferrous sulfate 325 mg (65 mg iron) tablet Take 325 mg by mouth Daily (before breakfast).  PREVIDENT 5000 ENAMEL PROTECT 1.1-5 % pste APPLY TWO TIMES A DAY  3 Allergies Allergen Reactions  Ibuprofen Other (comments) Can't take due to Crohns  Sulfa (Sulfonamide Antibiotics) Nausea Only Past Medical History:  
Diagnosis Date  Crohn disease ([facility]   
 5/2017  Environmental allergies  GERD (gastroesophageal reflux disease)  Herpes genitalia Past Surgical History:  
Procedure Laterality Date Efraín 78 Laser- HPV Family History Problem Relation Age of Onset  Heart Disease Mother  Bleeding Prob Mother   
     was on coumadin  Stroke Father  Cancer Sister   
     lymphoma  Diabetes Brother  Schizophrenia Brother  Diabetes Sister  Diabetes Sister  Mental Retardation Sister  No Known Problems Sister  Other Brother   
     paralysis from car accident  Heart Disease Brother  Pacemaker Brother Social History Tobacco Use  Smoking status: Never Smoker  Smokeless tobacco: Never Used Substance Use Topics  Alcohol use: Yes Comment: occasional  
  
 
ROS:  Feeling well. No dyspnea or chest pain on exertion. No abdominal pain, change in bowel habits, black or bloody stools. No urinary tract symptoms. GYN ROS: normal menses, no abnormal bleeding, pelvic pain or discharge, no breast pain or new or enlarging lumps on self exam, no discharge or pelvic pain. No neurological complaints. Objective:  
 
Visit Vitals /83 (BP 1 Location: Left arm, BP Patient Position: Sitting) Pulse 92 Temp 98.8 °F (37.1 °C) (Oral) Resp 16 Ht 5' 4\" (1.626 m) Wt 177 lb 3.2 oz (80.4 kg) LMP 04/22/2019 SpO2 96% BMI 30.42 kg/m² The patient appears well, alert, oriented x 3, in no distress. ENT normal.  Neck supple. No adenopathy or thyromegaly. RADHA. Lungs are clear, good air entry, no wheezes, rhonchi or rales. S1 and S2 normal, no murmurs, regular rate and rhythm. Abdomen soft without tenderness, guarding, mass or organomegaly. Extremities show no edema, normal peripheral pulses. Neurological is normal, no focal findings. BREAST EXAM: not examined PELVIC EXAM: examination not indicated Assessment/Plan:  
well woman 
counseled on breast self exam, mammography screening and adequate intake of calcium and vitamin D 
additional lab tests per orders Diagnoses and all orders for this visit: 1. Routine general medical examination at a health care facility 2. Essential hypertension Stable -     LIPID PANEL 
 
3. IGT (impaired glucose tolerance) -     AMB POC GLUCOSE, QUANTITATIVE, BLOOD 
-     AMB POC HEMOGLOBIN A1C 4. Crohn's disease without complication, unspecified gastrointestinal tract location [facility]) Stable 5. Encounter for medication monitoring -     METABOLIC PANEL, COMPREHENSIVE 
-     CBC W/O DIFF 6. Non morbid obesity I have reviewed/discussed the above normal BMI with the patient. I have recommended the following interventions: dietary management education, guidance, and counseling . Follow-up and Dispositions · Return in about 6 months (around 10/24/2019). reviewed diet, exercise and weight control 
cardiovascular risk and specific lipid/LDL goals reviewed 
reviewed medications and side effects in detail 
specific diabetic recommendations: low cholesterol diet, weight control and daily exercise discussed and glycohemoglobin and other lab monitoring discussed I have discussed diagnosis listed in this note with pt and/or family.  I have discussed treatment plans and options and the risk/benefit analysis of those options, including safe use of medications and possible medication side effects. Through the use of shared decision making we have agreed to the above plan. The patient has received an after-visit summary and questions were answered concerning future plans and follow up. Advise pt of any urgent changes then to proceed to the ER. Rosa Lutz

## 2019-04-24 NOTE — PROGRESS NOTES
Chief Complaint Patient presents with  Complete Physical  
  LMP 4/22/2019 no pap Patient states she had pap and pelvic by Dr. Tauna Snellen 2/2019 Mammogram 3/14/2019 Colonoscopy 05/16/2017 Dr. Manny Greer repeat in 10 years 1. Have you been to the ER, urgent care clinic since your last visit? Hospitalized since your last visit? No 
 
2. Have you seen or consulted any other health care providers outside of the [address] [place] [address] since your last visit? Include any pap smears or colon screening. Mammogram 3/14/2019

## 2019-04-25 LAB
ALBUMIN SERPL-MCNC: 4.8 G/DL (ref 3.5–5.5)
ALBUMIN/GLOB SERPL: 1.7 {RATIO} (ref 1.2–2.2)
ALP SERPL-CCNC: 88 IU/L (ref 39–117)
ALT SERPL-CCNC: 13 IU/L (ref 0–32)
AST SERPL-CCNC: 15 IU/L (ref 0–40)
BILIRUB SERPL-MCNC: 0.5 MG/DL (ref 0–1.2)
BUN SERPL-MCNC: 14 MG/DL (ref 6–24)
BUN/CREAT SERPL: 15 (ref 9–23)
CALCIUM SERPL-MCNC: 10.2 MG/DL (ref 8.7–10.2)
CHLORIDE SERPL-SCNC: 95 MMOL/L (ref 96–106)
CHOLEST SERPL-MCNC: 222 MG/DL (ref 100–199)
CO2 SERPL-SCNC: 26 MMOL/L (ref 20–29)
CREAT SERPL-MCNC: 0.96 MG/DL (ref 0.57–1)
ERYTHROCYTE [DISTWIDTH] IN BLOOD BY AUTOMATED COUNT: 15.6 % (ref 12.3–15.4)
GLOBULIN SER CALC-MCNC: 2.9 G/DL (ref 1.5–4.5)
GLUCOSE SERPL-MCNC: 113 MG/DL (ref 65–99)
HCT VFR BLD AUTO: 37.3 % (ref 34–46.6)
HDLC SERPL-MCNC: 50 MG/DL
HGB BLD-MCNC: 12.9 G/DL (ref 11.1–15.9)
INTERPRETATION, 910389: NORMAL
LDLC SERPL CALC-MCNC: 146 MG/DL (ref 0–99)
MCH RBC QN AUTO: 25.3 PG (ref 26.6–33)
MCHC RBC AUTO-ENTMCNC: 34.6 G/DL (ref 31.5–35.7)
MCV RBC AUTO: 73 FL (ref 79–97)
PLATELET # BLD AUTO: 376 X10E3/UL (ref 150–379)
POTASSIUM SERPL-SCNC: 3.3 MMOL/L (ref 3.5–5.2)
PROT SERPL-MCNC: 7.7 G/DL (ref 6–8.5)
RBC # BLD AUTO: 5.09 X10E6/UL (ref 3.77–5.28)
SODIUM SERPL-SCNC: 140 MMOL/L (ref 134–144)
TRIGL SERPL-MCNC: 129 MG/DL (ref 0–149)
VLDLC SERPL CALC-MCNC: 26 MG/DL (ref 5–40)
WBC # BLD AUTO: 11.5 X10E3/UL (ref 3.4–10.8)

## 2019-04-25 RX ORDER — POTASSIUM CHLORIDE 750 MG/1
10 TABLET, EXTENDED RELEASE ORAL DAILY
Qty: 30 TAB | Refills: 6 | Status: SHIPPED | OUTPATIENT
Start: 2019-04-25 | End: 2019-10-23 | Stop reason: SDUPTHER

## 2019-07-19 ENCOUNTER — OFFICE VISIT (OUTPATIENT)
Dept: FAMILY MEDICINE CLINIC | Age: 49
End: 2019-07-19

## 2019-07-19 VITALS
DIASTOLIC BLOOD PRESSURE: 84 MMHG | BODY MASS INDEX: 30.52 KG/M2 | SYSTOLIC BLOOD PRESSURE: 130 MMHG | HEART RATE: 86 BPM | RESPIRATION RATE: 16 BRPM | WEIGHT: 178.8 LBS | OXYGEN SATURATION: 99 % | TEMPERATURE: 97.2 F | HEIGHT: 64 IN

## 2019-07-19 DIAGNOSIS — N30.00 ACUTE CYSTITIS WITHOUT HEMATURIA: Primary | ICD-10-CM

## 2019-07-19 DIAGNOSIS — R73.02 IGT (IMPAIRED GLUCOSE TOLERANCE): ICD-10-CM

## 2019-07-19 LAB
BILIRUB UR QL STRIP: NEGATIVE
GLUCOSE POC: 96 MG/DL
GLUCOSE UR-MCNC: NEGATIVE MG/DL
HBA1C MFR BLD HPLC: 5.9 %
KETONES P FAST UR STRIP-MCNC: NEGATIVE MG/DL
PH UR STRIP: 6 [PH] (ref 4.6–8)
PROT UR QL STRIP: NEGATIVE
SP GR UR STRIP: 1.01 (ref 1–1.03)
UA UROBILINOGEN AMB POC: NORMAL (ref 0.2–1)
URINALYSIS CLARITY POC: CLEAR
URINALYSIS COLOR POC: YELLOW
URINE BLOOD POC: NORMAL
URINE LEUKOCYTES POC: NORMAL
URINE NITRITES POC: NEGATIVE

## 2019-07-19 RX ORDER — NITROFURANTOIN 25; 75 MG/1; MG/1
100 CAPSULE ORAL 2 TIMES DAILY
Qty: 10 CAP | Refills: 0 | Status: SHIPPED | OUTPATIENT
Start: 2019-07-19 | End: 2019-07-24

## 2019-07-19 NOTE — PROGRESS NOTES
Chief Complaint   Patient presents with    Urinary Frequency           1. Have you been to the ER, urgent care clinic since your last visit? Hospitalized since your last visit? No    2. Have you seen or consulted any other health care providers outside of the [address] [place] [address] since your last visit? Include any pap smears or colon screening.  No

## 2019-07-19 NOTE — PROGRESS NOTES
HISTORY OF PRESENT ILLNESS  Justin Leach is a 47 y.o. female. HPI   C/o urinary frequency for the past 2 to 3 weeks. No lower back. Slight pressure in the lower abd with urination. No fever or chills. No blood in the urine. Getting up 2 to 3 times at night to also pass her urine. Patient Active Problem List   Diagnosis Code    Hypovitaminosis D E55.9    Herpes genitalia A60.00    Anemia D64.9    IGT (impaired glucose tolerance) R73.02    [facility] X66.20    Encounter for medication monitoring Z51.81    Non morbid obesity E66.9       Current Outpatient Medications   Medication Sig Dispense Refill    nitrofurantoin, macrocrystal-monohydrate, (MACROBID) 100 mg capsule Take 1 Cap by mouth two (2) times a day for 5 days. 10 Cap 0    potassium chloride (KLOR-CON) 10 mEq tablet Take 1 Tab by mouth daily. 30 Tab 6    cholecalciferol, vitamin D3, (VITAMIN D3) 2,000 unit tab Take 1 Tab by mouth daily.  biotin 10,000 mcg cap Take 1 Tab by mouth daily.  adalimumab (HUMIRA PEN) 40 mg/0.8 mL injection pen Humira Pen 40 mg/0.8 mL subcutaneous every other week      pantoprazole (PROTONIX) 40 mg tablet Take 1 Tab by mouth daily as needed. 30 Tab 3    chlorthalidone (HYGROTEN) 25 mg tablet Take 1 Tab by mouth daily. 90 Tab 1    amLODIPine (NORVASC) 10 mg tablet Take 1 Tab by mouth daily. 90 Tab 1    meclizine (ANTIVERT) 12.5 mg tablet Take 1 Tab by mouth three (3) times daily as needed. 30 Tab 0    cyanocobalamin 1,000 mcg tablet Take 1,000 mcg by mouth daily.  ferrous sulfate 325 mg (65 mg iron) tablet Take 325 mg by mouth Daily (before breakfast).       PREVIDENT 5000 ENAMEL PROTECT 1.1-5 % pste APPLY TWO TIMES A DAY  3       Allergies   Allergen Reactions    Ibuprofen Other (comments)     Can't take due to Crohns    Sulfa (Sulfonamide Antibiotics) Nausea Only       Past Medical History:   Diagnosis Date    [facility]     5/2017    Environmental allergies     GERD (gastroesophageal reflux disease)     Herpes genitalia        Past Surgical History:   Procedure Laterality Date    HX GYN  1991    Laser- HPV       Family History   Problem Relation Age of Onset    Heart Disease Mother     Bleeding Prob Mother         was on coumadin    Stroke Father     Cancer Sister         lymphoma    Diabetes Brother     Schizophrenia Brother     Diabetes Sister     Diabetes Sister     Mental Retardation Sister     No Known Problems Sister     Other Brother         paralysis from car accident    Heart Disease Brother     Pacemaker Brother        Social History     Tobacco Use    Smoking status: Never Smoker    Smokeless tobacco: Never Used   Substance Use Topics    Alcohol use: Yes     Comment: occasional          ROS    Physical Exam   Appears well, in no apparent distress. Vital signs are normal. The abdomen is soft without tenderness, guarding, mass, rebound or organomegaly. No CVA tenderness or inguinal adenopathy noted. Urine dipstick shows positive for WBC's. Micro exam: 5-10 WBC's per HPF and few bacteria. ASSESSMENT and PLAN  Diagnoses and all orders for this visit:    1. Acute cystitis without hematuria  -     CULTURE, URINE  -     AMB POC URINALYSIS DIP STICK AUTO W/ MICRO  -     nitrofurantoin, macrocrystal-monohydrate, (MACROBID) 100 mg capsule; Take 1 Cap by mouth two (2) times a day for 5 days. Treatment per orders - also push fluids, may use Pyridium OTC prn. Call or return to clinic prn if these symptoms worsen or fail to improve as anticipated.     3. IGT (impaired glucose tolerance)  -     AMB POC HEMOGLOBIN A1C        reviewed medications and side effects in detail

## 2019-07-21 LAB — BACTERIA UR CULT: NORMAL

## 2019-08-12 ENCOUNTER — TELEPHONE (OUTPATIENT)
Dept: FAMILY MEDICINE CLINIC | Age: 49
End: 2019-08-12

## 2019-08-12 NOTE — TELEPHONE ENCOUNTER
Per Dr. Rivera advised patient to get AZO and use , drink plenty of water and call in a couple of days.

## 2019-08-12 NOTE — TELEPHONE ENCOUNTER
Patient finished medication from July 19 th and is still having urinary frequency and some pulling in lower abdomen.

## 2019-10-23 ENCOUNTER — OFFICE VISIT (OUTPATIENT)
Dept: FAMILY MEDICINE CLINIC | Age: 49
End: 2019-10-23

## 2019-10-23 VITALS
BODY MASS INDEX: 30.56 KG/M2 | TEMPERATURE: 97.8 F | OXYGEN SATURATION: 97 % | SYSTOLIC BLOOD PRESSURE: 125 MMHG | HEIGHT: 64 IN | WEIGHT: 179 LBS | HEART RATE: 96 BPM | RESPIRATION RATE: 16 BRPM | DIASTOLIC BLOOD PRESSURE: 87 MMHG

## 2019-10-23 DIAGNOSIS — E55.9 HYPOVITAMINOSIS D: ICD-10-CM

## 2019-10-23 DIAGNOSIS — I10 ESSENTIAL HYPERTENSION: Primary | ICD-10-CM

## 2019-10-23 DIAGNOSIS — R73.02 IGT (IMPAIRED GLUCOSE TOLERANCE): ICD-10-CM

## 2019-10-23 DIAGNOSIS — E78.00 HYPERCHOLESTEROLEMIA: ICD-10-CM

## 2019-10-23 DIAGNOSIS — Z23 ENCOUNTER FOR IMMUNIZATION: ICD-10-CM

## 2019-10-23 DIAGNOSIS — K21.9 GASTROESOPHAGEAL REFLUX DISEASE, ESOPHAGITIS PRESENCE NOT SPECIFIED: ICD-10-CM

## 2019-10-23 DIAGNOSIS — E66.9 NON MORBID OBESITY: ICD-10-CM

## 2019-10-23 DIAGNOSIS — K50.90 CROHN'S DISEASE WITHOUT COMPLICATION, UNSPECIFIED GASTROINTESTINAL TRACT LOCATION (HCC): ICD-10-CM

## 2019-10-23 DIAGNOSIS — Z51.81 ENCOUNTER FOR MEDICATION MONITORING: ICD-10-CM

## 2019-10-23 LAB — HBA1C MFR BLD HPLC: 5.9 %

## 2019-10-23 RX ORDER — POTASSIUM CHLORIDE 750 MG/1
10 TABLET, EXTENDED RELEASE ORAL DAILY
Qty: 90 TAB | Refills: 3 | Status: SHIPPED | OUTPATIENT
Start: 2019-10-23 | End: 2021-05-25 | Stop reason: SDUPTHER

## 2019-10-23 RX ORDER — CHLORTHALIDONE 25 MG/1
25 TABLET ORAL DAILY
Qty: 90 TAB | Refills: 3 | Status: SHIPPED | OUTPATIENT
Start: 2019-10-23 | End: 2021-06-22

## 2019-10-23 RX ORDER — AMLODIPINE BESYLATE 10 MG/1
10 TABLET ORAL DAILY
Qty: 90 TAB | Refills: 3 | Status: SHIPPED | OUTPATIENT
Start: 2019-10-23 | End: 2021-06-22

## 2019-10-23 RX ORDER — PANTOPRAZOLE SODIUM 40 MG/1
40 TABLET, DELAYED RELEASE ORAL
Qty: 90 TAB | Refills: 3 | Status: SHIPPED | OUTPATIENT
Start: 2019-10-23 | End: 2020-12-14 | Stop reason: ALTCHOICE

## 2019-10-23 NOTE — PROGRESS NOTES
HISTORY OF PRESENT ILLNESS Marcia Blanc is a 47 y.o. female. HPI 
{Choose one or more HPI Chronic Disease Notes, press DELETE if none desired:02044} {Choose one or more SmartLinks; press DELETE if none desired:[id number]} {Choose one or more Last Lab values; press DELETE if none desired:[id number]} ROS Physical Exam 
 
ASSESSMENT and PLAN 
{ASSESSMENT/PLAN:75545}

## 2019-10-23 NOTE — PROGRESS NOTES
HISTORY OF PRESENT ILLNESS  Sandro Crawley is a 47 y.o. female. Follow up on BP. Has hx of Crohn's disease being followed by GI. She is on Humira injections which seems to be working well for her. Cardiovascular Review:  The patient has hypertension. Diet and Lifestyle: generally follows a low fat low cholesterol diet, generally follows a low sodium diet, exercises sporadically  Home BP Monitoring: is not measured at home. Pertinent ROS: taking medications as instructed, no medication side effects noted, no TIA's, no chest pain on exertion, no dyspnea on exertion, no swelling of ankles, no palpitations. Glucose intolerance reveiw:  She has IGT. Last a1c level was 5.8 in 1/18. Diabetic ROS - further diabetic ROS: no polyuria or polydipsia, no chest pain, dyspnea or TIA's, no numbness, tingling or pain in extremities, no unusual visual symptoms. Lab review: orders written for new lab studies as appropriate; see orders. Patient Active Problem List   Diagnosis Code    Hypovitaminosis D E55.9    Herpes genitalia A60.00    Anemia D64.9    IGT (impaired glucose tolerance) R73.02    Crohn's disease without complication ([facility] V22.53    Encounter for medication monitoring Z51.81    Non morbid obesity E66.9       Current Outpatient Medications   Medication Sig Dispense Refill    amLODIPine (NORVASC) 10 mg tablet Take 1 Tab by mouth daily. 90 Tab 3    chlorthalidone (HYGROTEN) 25 mg tablet Take 1 Tab by mouth daily. 90 Tab 3    pantoprazole (PROTONIX) 40 mg tablet Take 1 Tab by mouth daily as needed (acid reflux). 90 Tab 3    potassium chloride (KLOR-CON) 10 mEq tablet Take 1 Tab by mouth daily. 90 Tab 3    cholecalciferol, vitamin D3, (VITAMIN D3) 2,000 unit tab Take 1 Tab by mouth daily.  biotin 10,000 mcg cap Take 1 Tab by mouth daily.       adalimumab (HUMIRA PEN) 40 mg/0.8 mL injection pen Humira Pen 40 mg/0.8 mL subcutaneous every other week      meclizine (ANTIVERT) 12.5 mg tablet Take 1 Tab by mouth three (3) times daily as needed. 30 Tab 0    cyanocobalamin 1,000 mcg tablet Take 1,000 mcg by mouth daily.  ferrous sulfate 325 mg (65 mg iron) tablet Take 325 mg by mouth Daily (before breakfast).       PREVIDENT 5000 ENAMEL PROTECT 1.1-5 % pste APPLY TWO TIMES A DAY  3       Allergies   Allergen Reactions    Ibuprofen Other (comments)     Can't take due to Crohns    Sulfa (Sulfonamide Antibiotics) Nausea Only         Past Medical History:   Diagnosis Date    Crohn disease ([address]     5/2017    Environmental allergies     GERD (gastroesophageal reflux disease)     Herpes genitalia          Past Surgical History:   Procedure Laterality Date    HX GYN  1991    Laser- HPV         Family History   Problem Relation Age of Onset    Heart Disease Mother     Bleeding Prob Mother         was on coumadin    Stroke Father     Cancer Sister         lymphoma    Diabetes Brother     Schizophrenia Brother     Diabetes Sister     Diabetes Sister     Mental Retardation Sister     No Known Problems Sister     Other Brother         paralysis from car accident    Heart Disease Brother     Pacemaker Brother     Kidney Disease Brother     Crohn's Disease Brother        Social History     Tobacco Use    Smoking status: Never Smoker    Smokeless tobacco: Never Used   Substance Use Topics    Alcohol use: Yes     Comment: occasional        Lab Results   Component Value Date/Time    WBC 11.5 (H) 04/24/2019 04:26 PM    HGB 12.9 04/24/2019 04:26 PM    HCT 37.3 04/24/2019 04:26 PM    PLATELET 574 14/31/4523 04:26 PM    MCV 73 (L) 04/24/2019 04:26 PM     Lab Results   Component Value Date/Time    Cholesterol, total 222 (H) 04/24/2019 04:26 PM    HDL Cholesterol 50 04/24/2019 04:26 PM    LDL, calculated 146 (H) 04/24/2019 04:26 PM    Triglyceride 129 04/24/2019 04:26 PM     Lab Results   Component Value Date/Time    TSH 1.250 09/02/2015 10:21 AM      Lab Results   Component Value Date/Time Sodium 140 04/24/2019 04:26 PM    Potassium 3.3 (L) 04/24/2019 04:26 PM    Chloride 95 (L) 04/24/2019 04:26 PM    CO2 26 04/24/2019 04:26 PM    Glucose 113 (H) 04/24/2019 04:26 PM    BUN 14 04/24/2019 04:26 PM    Creatinine 0.96 04/24/2019 04:26 PM    BUN/Creatinine ratio 15 04/24/2019 04:26 PM    GFR est AA 80 04/24/2019 04:26 PM    GFR est non-AA 70 04/24/2019 04:26 PM    Calcium 10.2 04/24/2019 04:26 PM    Bilirubin, total 0.5 04/24/2019 04:26 PM    ALT (SGPT) 13 04/24/2019 04:26 PM    AST (SGOT) 15 04/24/2019 04:26 PM    Alk. phosphatase 88 04/24/2019 04:26 PM    Protein, total 7.7 04/24/2019 04:26 PM    Albumin 4.8 04/24/2019 04:26 PM    A-G Ratio 1.7 04/24/2019 04:26 PM      Lab Results   Component Value Date/Time    Hemoglobin A1c 6.4 (H) 03/05/2014 12:58 PM    Hemoglobin A1c (POC) 5.9 10/23/2019 03:08 PM         Review of Systems   Constitutional: Negative for malaise/fatigue. HENT: Negative for congestion. Eyes: Negative for blurred vision. Respiratory: Negative for cough and shortness of breath. Cardiovascular: Negative for chest pain, palpitations and leg swelling. Gastrointestinal: Negative for abdominal pain, constipation and heartburn. Genitourinary: Negative for dysuria, frequency and urgency. Musculoskeletal: Negative for back pain and joint pain. Neurological: Negative for dizziness, tingling and headaches. Endo/Heme/Allergies: Negative for environmental allergies. Psychiatric/Behavioral: Negative for depression. The patient does not have insomnia. Physical Exam   Constitutional: She appears well-developed and well-nourished.    /87 (BP 1 Location: Left arm, BP Patient Position: Sitting)   Pulse 96   Temp 97.8 °F (36.6 °C) (Oral)   Resp 16   Ht 5' 4\" (1.626 m)   Wt 179 lb (81.2 kg)   LMP 09/26/2019   SpO2 97%   BMI 30.73 kg/m²      HENT:   Right Ear: Tympanic membrane and ear canal normal.   Left Ear: Tympanic membrane and ear canal normal.   Nose: No mucosal edema or rhinorrhea. Mouth/Throat: Oropharynx is clear and moist and mucous membranes are normal.   Neck: Normal range of motion. Neck supple. No thyromegaly present. Cardiovascular: Normal rate and regular rhythm. No murmur heard. Pulmonary/Chest: Effort normal and breath sounds normal.   Abdominal: Soft. Bowel sounds are normal. There is no tenderness. Musculoskeletal: Normal range of motion. She exhibits no edema. Lymphadenopathy:     She has no cervical adenopathy. Skin: Skin is warm and dry. Psychiatric: She has a normal mood and affect. Nursing note and vitals reviewed. ASSESSMENT and PLAN  Diagnoses and all orders for this visit:    1. Essential hypertension  -     Refill amLODIPine (NORVASC) 10 mg tablet; Take 1 Tab by mouth daily. -     Refill chlorthalidone (HYGROTEN) 25 mg tablet; Take 1 Tab by mouth daily. Discussed sodium restriction, high k rich diet, maintaining ideal body weight and regular exercise program such as daily walking 30 min perday 4-5 times per week, as physiologic means to achieve blood pressure control.  Medication compliance advised. 2. Hypercholesterolemia  -     LIPID PANEL    3. IGT (impaired glucose tolerance)  -     AMB POC HEMOGLOBIN A1C    4. Gastroesophageal reflux disease, esophagitis presence not specified  Stable   -     pantoprazole (PROTONIX) 40 mg tablet; Take 1 Tab by mouth daily as needed (acid reflux). 5. Crohn's disease without complication, unspecified gastrointestinal tract location ([facility]  Stable     6. Hypovitaminosis D  -     VITAMIN D, 25 HYDROXY    7. Encounter for medication monitoring  -     METABOLIC PANEL, BASIC    8. Non morbid obesity  I have reviewed/discussed the above normal BMI with the patient. I have recommended the following interventions: dietary management education, guidance, and counseling . 9.  Encounter for immunization  -     INFLUENZA VIRUS VAC QUAD,SPLIT,PRESV FREE SYRINGE IM  -     IN Aldair Johnston ADMIN,1 SINGLE/COMB VAC/TOXOID    Other orders  -     potassium chloride (KLOR-CON) 10 mEq tablet; Take 1 Tab by mouth daily. Follow-up and Dispositions    · Return in about 6 months (around 4/23/2020). reviewed diet, exercise and weight control  cardiovascular risk and specific lipid/LDL goals reviewed  reviewed medications and side effects in detail  specific diabetic recommendations: low cholesterol diet, weight control and daily exercise discussed and glycohemoglobin and other lab monitoring discussed      I have discussed diagnosis listed in this note with pt and/or family. I have discussed treatment plans and options and the risk/benefit analysis of those options, including safe use of medications and possible medication side effects. Through the use of shared decision making we have agreed to the above plan. The patient has received an after-visit summary and questions were answered concerning future plans and follow up. Advise pt of any urgent changes then to proceed to the ER.

## 2019-10-23 NOTE — PROGRESS NOTES
Chief Complaint   Patient presents with    Hypertension     follow up           Mammogram 3/14/2019    Colonoscopy 5/16/2017 Dr. Miguel Jarquin repeat in 10 years      Verbal order received per Dr. Ian Tejeda- flu vaccine IM- VORB    Pt received flu vaccine IM in left deltoid without any difficulty. 1. Have you been to the ER, urgent care clinic since your last visit? Hospitalized since your last visit? No    2. Have you seen or consulted any other health care providers outside of the [address] [place] [address] since your last visit? Include any pap smears or colon screening.  Mammogram 3/14/2019

## 2019-10-24 LAB
25(OH)D3+25(OH)D2 SERPL-MCNC: 26.1 NG/ML (ref 30–100)
BUN SERPL-MCNC: 10 MG/DL (ref 6–24)
BUN/CREAT SERPL: 13 (ref 9–23)
CALCIUM SERPL-MCNC: 10.2 MG/DL (ref 8.7–10.2)
CHLORIDE SERPL-SCNC: 96 MMOL/L (ref 96–106)
CHOLEST SERPL-MCNC: 204 MG/DL (ref 100–199)
CO2 SERPL-SCNC: 27 MMOL/L (ref 20–29)
CREAT SERPL-MCNC: 0.79 MG/DL (ref 0.57–1)
GLUCOSE SERPL-MCNC: 81 MG/DL (ref 65–99)
HDLC SERPL-MCNC: 45 MG/DL
INTERPRETATION, 910389: NORMAL
LDLC SERPL CALC-MCNC: 120 MG/DL (ref 0–99)
POTASSIUM SERPL-SCNC: 3.7 MMOL/L (ref 3.5–5.2)
SODIUM SERPL-SCNC: 136 MMOL/L (ref 134–144)
TRIGL SERPL-MCNC: 193 MG/DL (ref 0–149)
VLDLC SERPL CALC-MCNC: 39 MG/DL (ref 5–40)

## 2020-03-09 ENCOUNTER — HOSPITAL ENCOUNTER (OUTPATIENT)
Dept: INFUSION THERAPY | Age: 50
Discharge: HOME | End: 2020-03-09
Payer: COMMERCIAL

## 2020-03-09 VITALS
DIASTOLIC BLOOD PRESSURE: 70 MMHG | TEMPERATURE: 97.4 F | BODY MASS INDEX: 31.58 KG/M2 | OXYGEN SATURATION: 97 % | RESPIRATION RATE: 18 BRPM | WEIGHT: 184 LBS | SYSTOLIC BLOOD PRESSURE: 111 MMHG | HEART RATE: 82 BPM

## 2020-03-09 PROCEDURE — 74011250636 HC RX REV CODE- 250/636: Performed by: INTERNAL MEDICINE

## 2020-03-09 PROCEDURE — 96365 THER/PROPH/DIAG IV INF INIT: CPT

## 2020-03-09 RX ADMIN — USTEKINUMAB 390 MG: 130 SOLUTION INTRAVENOUS at 12:10

## 2020-03-09 NOTE — PROGRESS NOTES
[facility] Short Note                       Date: 2020    Name: Ale Hernandez    MRN: [id number]         : 1970    Treatment: Zygmunt Champ      Ms. Chu was assessed and education was provided. 24 gauge IV placed to the right FA w/o difficulty. No labs required. All questions and concerns regarding infusion addressed. Patient here for initial infusion then will do matainace injections at home every 8 weeks. Problem: Knowledge Deficit  Goal: *Verbalizes understanding of procedures and medications  Outcome: Progressing Towards Goal     Problem: Patient Education:  Go to Education Activity  Goal: Patient/Family Education  Outcome: Progressing Towards Goal    Ms. Chu's vitals were reviewed prior to and after treatment. Patient Vitals for the past 12 hrs:   Temp Pulse Resp BP SpO2   20 1320 97.4 °F (36.3 °C) 82 18 111/70    20 1130 98.3 °F (36.8 °C) 89 18 116/74 97 %       Medications given:  Medications Administered     ustekinumab (STELARA) 390 mg in 0.9% sodium chloride 250 mL infusion     Admin Date  2020 Action  Given Dose  390 mg Rate  250 mL/hr Route  IntraVENous Administered By  Flakito Avilez RN                Ms. Claudette Cedeno tolerated the infusion, and had no complaints. IV flushed and removed. Ms. Claudette Cedeno was discharged from [place] [address] in stable condition at 1325.      Future Appointments   Date Time Provider Lino Argueta   2020  2:45 PM MD Arsh Michael RN  2020  2:01 PM

## 2020-04-28 ENCOUNTER — VIRTUAL VISIT (OUTPATIENT)
Dept: FAMILY MEDICINE CLINIC | Age: 50
End: 2020-04-28

## 2020-04-28 VITALS — WEIGHT: 184 LBS | BODY MASS INDEX: 31.41 KG/M2 | HEIGHT: 64 IN

## 2020-04-28 DIAGNOSIS — R73.02 IGT (IMPAIRED GLUCOSE TOLERANCE): ICD-10-CM

## 2020-04-28 DIAGNOSIS — K21.9 GASTROESOPHAGEAL REFLUX DISEASE, ESOPHAGITIS PRESENCE NOT SPECIFIED: ICD-10-CM

## 2020-04-28 DIAGNOSIS — E66.9 NON MORBID OBESITY: ICD-10-CM

## 2020-04-28 DIAGNOSIS — I10 ESSENTIAL HYPERTENSION: Primary | ICD-10-CM

## 2020-04-28 DIAGNOSIS — E78.00 HYPERCHOLESTEROLEMIA: ICD-10-CM

## 2020-04-28 DIAGNOSIS — K50.90 CROHN'S DISEASE WITHOUT COMPLICATION, UNSPECIFIED GASTROINTESTINAL TRACT LOCATION (HCC): ICD-10-CM

## 2020-04-28 DIAGNOSIS — Z51.81 ENCOUNTER FOR MEDICATION MONITORING: ICD-10-CM

## 2020-04-28 RX ORDER — IBUPROFEN 800 MG/1
800 TABLET ORAL
Qty: 30 TAB | Refills: 1 | Status: SHIPPED | OUTPATIENT
Start: 2020-04-28 | End: 2020-04-28 | Stop reason: CLARIF

## 2020-04-28 RX ORDER — CYCLOBENZAPRINE HCL 10 MG
5-10 TABLET ORAL
Qty: 20 TAB | Refills: 0 | Status: SHIPPED | OUTPATIENT
Start: 2020-04-28 | End: 2020-04-28 | Stop reason: CLARIF

## 2020-04-28 NOTE — PROGRESS NOTES
Per Dr. Moi Shearer, called [facility] [phone] and canceled e-scribe Flexeril and Ibuprofen.

## 2020-04-28 NOTE — PROGRESS NOTES
Name and  Verified. Chief Complaint   Patient presents with    Follow-up     3 Month/Hypertension/Hypercholesterolemia     Vitals: BP/Pulse/Resp/SpO2- Not Taken    Temp: Patient denies having fever    1. Have you been to the ER, urgent care clinic since your last visit? Hospitalized since your last visit? No    2. Have you seen or consulted any other health care providers outside of the [address] [place] [address] since your last visit? Include any pap smears or colon screening.   Yes    Dr. Bert Dey  2020  Gastroenterologist  Crohn's

## 2020-04-28 NOTE — PROGRESS NOTES
HISTORY OF PRESENT ILLNESS  Creston Sicard is a 47 y.o. female. HPI   Patient encounter by synchronous (real-time) audio-video technology which is patient-initiated. Patient is aware that this encounter is a billable service with coverage as determined by her insurance carrier, all discussed at the time of check-in. Patient has given verbal consent to proceed. Follow up on BP. She is feeling well. She is teleworking. Lives with her dtg and son and her . Her family is working outside of the home but she is taking the precautionary measures to sanitize the common areas that they share in the home. Magalis Saleh Has hx of Crohn's disease being followed by GI. She is on Humira injections which seems to be working well for her. Cardiovascular Review:  The patient has hypertension. Diet and Lifestyle: generally follows a low fat low cholesterol diet, generally follows a low sodium diet, exercises sporadically  Home BP Monitoring: is not measured at home. Pertinent ROS: taking medications as instructed, no medication side effects noted, no TIA's, no chest pain on exertion, no dyspnea on exertion, no swelling of ankles, no palpitations. Glucose intolerance reveiw:  She has IGT. Last a1c level was 5.8 in 1/18. Diabetic ROS - further diabetic ROS: no polyuria or polydipsia, no chest pain, dyspnea or TIA's, no numbness, tingling or pain in extremities, no unusual visual symptoms. Lab review: orders written for new lab studies as appropriate; see orders. Patient Active Problem List   Diagnosis Code    Hypovitaminosis D E55.9    Herpes genitalia A60.00    Anemia D64.9    IGT (impaired glucose tolerance) R73.02    Cro[facility]'s disease without complication ([facility] N65.04    Encounter for medication monitoring Z51.81    Non morbid obesity E66.9       Current Outpatient Medications   Medication Sig Dispense Refill    ustekinumab (ustekinaumab) 90 mg/mL injection 90 mg by SubCUTAneous route once.  Once a Month      amLODIPine (NORVASC) 10 mg tablet Take 1 Tab by mouth daily. 90 Tab 3    chlorthalidone (HYGROTEN) 25 mg tablet Take 1 Tab by mouth daily. 90 Tab 3    pantoprazole (PROTONIX) 40 mg tablet Take 1 Tab by mouth daily as needed (acid reflux). 90 Tab 3    potassium chloride (KLOR-CON) 10 mEq tablet Take 1 Tab by mouth daily. 90 Tab 3    cholecalciferol, vitamin D3, (VITAMIN D3) 2,000 unit tab Take 1 Tab by mouth daily.  cyanocobalamin 1,000 mcg tablet Take 1,000 mcg by mouth daily.  ferrous sulfate 325 mg (65 mg iron) tablet Take 325 mg by mouth Daily (before breakfast).  PREVIDENT 5000 ENAMEL PROTECT 1.1-5 % pste APPLY TWO TIMES A DAY  3    biotin 10,000 mcg cap Take 1 Tab by mouth daily.  adalimumab (HUMIRA PEN) 40 mg/0.8 mL injection pen Humira Pen 40 mg/0.8 mL subcutaneous every other week      meclizine (ANTIVERT) 12.5 mg tablet Take 1 Tab by mouth three (3) times daily as needed.  30 Tab 0       Allergies   Allergen Reactions    Ibuprofen Other (comments)     Can't take due to Crohns    Sulfa (Sulfonamide Antibiotics) Nausea Only       Past Medical History:   Diagnosis Date    [facility] ([facility]     5/2017    Environmental allergies     GERD (gastroesophageal reflux disease)     Herpes genitalia        Past Surgical History:   Procedure Laterality Date    HX GYN  1991    Laser- HPV       Family History   Problem Relation Age of Onset    Heart Disease Mother     Bleeding Prob Mother         was on coumadin    Stroke Father     Cancer Sister         lymphoma    Diabetes Brother     Schizophrenia Brother     Diabetes Sister     Diabetes Sister     Mental Retardation Sister     No Known Problems Sister     Other Brother         paralysis from car accident    Heart Disease Brother     Pacemaker Brother     Kidney Disease Brother     Crohn's Disease Brother        Social History     Tobacco Use    Smoking status: Never Smoker    Smokeless tobacco: Never Used Substance Use Topics    Alcohol use: Yes     Comment: occasional        Lab Results   Component Value Date/Time    WBC 11.5 (H) 04/24/2019 04:26 PM    HGB 12.9 04/24/2019 04:26 PM    HCT 37.3 04/24/2019 04:26 PM    PLATELET 605 52/92/1113 04:26 PM    MCV 73 (L) 04/24/2019 04:26 PM     Lab Results   Component Value Date/Time    Cholesterol, total 204 (H) 10/23/2019 03:05 PM    HDL Cholesterol 45 10/23/2019 03:05 PM    LDL, calculated 120 (H) 10/23/2019 03:05 PM    Triglyceride 193 (H) 10/23/2019 03:05 PM     Lab Results   Component Value Date/Time    TSH 1.250 09/02/2015 10:21 AM      Lab Results   Component Value Date/Time    Sodium 136 10/23/2019 03:05 PM    Potassium 3.7 10/23/2019 03:05 PM    Chloride 96 10/23/2019 03:05 PM    CO2 27 10/23/2019 03:05 PM    Glucose 81 10/23/2019 03:05 PM    BUN 10 10/23/2019 03:05 PM    Creatinine 0.79 10/23/2019 03:05 PM    BUN/Creatinine ratio 13 10/23/2019 03:05 PM    GFR est  10/23/2019 03:05 PM    GFR est non-AA 88 10/23/2019 03:05 PM    Calcium 10.2 10/23/2019 03:05 PM    Bilirubin, total 0.5 04/24/2019 04:26 PM    ALT (SGPT) 13 04/24/2019 04:26 PM    AST (SGOT) 15 04/24/2019 04:26 PM    Alk. phosphatase 88 04/24/2019 04:26 PM    Protein, total 7.7 04/24/2019 04:26 PM    Albumin 4.8 04/24/2019 04:26 PM    A-G Ratio 1.7 04/24/2019 04:26 PM      Lab Results   Component Value Date/Time    Hemoglobin A1c 6.4 (H) 03/05/2014 12:58 PM    Hemoglobin A1c (POC) 5.9 10/23/2019 03:08 PM         Review of Systems   Constitutional: Negative for malaise/fatigue. HENT: Negative for congestion. Eyes: Negative for blurred vision. Respiratory: Negative for cough and shortness of breath. Cardiovascular: Negative for chest pain, palpitations and leg swelling. Gastrointestinal: Negative for abdominal pain, constipation and heartburn. Genitourinary: Negative for dysuria, frequency and urgency. Musculoskeletal: Negative for back pain and joint pain.    Neurological: Negative for dizziness, tingling and headaches. Endo/Heme/Allergies: Negative for environmental allergies. Psychiatric/Behavioral: Negative for depression. The patient does not have insomnia. Physical Exam  Constitutional:       Appearance: Normal appearance. Pulmonary:      Effort: Pulmonary effort is normal.   Neurological:      Mental Status: She is alert. Psychiatric:         Mood and Affect: Mood normal.         Thought Content: Thought content normal.         ASSESSMENT and PLAN  Diagnoses and all orders for this visit:    1. Essential hypertension  Discussed sodium restriction, high k rich diet, maintaining ideal body weight and regular exercise program such as daily walking 30 min perday 4-5 times per week, as physiologic means to achieve blood pressure control.  Medication compliance advised. 2. Hypercholesterolemia  Continue to monitor. Work on diet and exercise. 3. IGT (impaired glucose tolerance)  Continue to monitor. Work on diet and exercise. 4. Gastroesophageal reflux disease, esophagitis presence not specified  Stable on protonix. 5. Crohn's disease without complication, unspecified gastrointestinal tract location [facility])  As per GI    6. Encounter for medication monitoring    7. Non morbid obesity  I have reviewed/discussed the above normal BMI with the patient. I have recommended the following interventions: dietary management education, guidance, and counseling . reviewed diet, exercise and weight control  cardiovascular risk and specific lipid/LDL goals reviewed  reviewed medications and side effects in detail    I have discussed diagnosis listed in this note with pt and/or family. I have discussed treatment plans and options and the risk/benefit analysis of those options, including safe use of medications and possible medication side effects. Through the use of shared decision making we have agreed to the above plan.  The patient has received an after-visit summary and questions were answered concerning future plans and follow up. Advise pt of any urgent changes then to proceed to the ER. Discussed issues related to COVID-19. Pt was told that the best way to prevent illness is to avoid being exposed to this virus by cleaning  hands often using soap and water or using a hand  that contains at least 60% alcohol. Avoid touching your eyes, nose, and mouth. Avoid close contact with people who are sick. Put distance between yourself and other people of at least 6 feet. Throw used tissues in the trash immediately. Pt was told that currently there is no antiviral medication which is recommended to treat COVID-19. Current treatment is aimed to relieve sxs such as pain relievers and to avoid ibuprofen but to use acetaminophen, anti-cough medication, get plenty of rest and a lot of oral hydration. Take everyday precautions to keep space between yourself and others when you go out in public. Avoid crowds as much as possible. Avoid non-essential travel going only out to get groceries, seek medical care or to the pharmacy to pick prescriptions. Wear a facemask when going out. During a COVID-19 outbreak in your community or work, stay home as much as possible to further reduce your risk of being exposed. Pt was told that currently there is no antiviral medication which is recommended to treat COVID-19. Current treatment is aimed to relieve sxs such as pain relievers and to avoid ibuprofen but to use acetaminophen, anti-cough medication, get plenty of rest and a lot of oral hydration. Due to this being a TeleHealth encounter (During [address] emergency), evaluation of the following organ systems was limited: Vital/Constitutional/EENT/Resp/CV/GI//MS/Neuro/Skin/Heme-Lymph-Imm.   Pursuant to the emergency declaration under the [address] and [place] [address] waiver authority and the [employer] and [employer] Supplemental Appropriations Act, this Virtual Visit was conducted, with patient's consent, to reduce the patient's risk of exposure to COVID-19 and provide continuity of care for an established patient. Services were provided through a video synchronous discussion virtually to substitute for in-person appointment. This visit was completed using [email or web address] me    Time in virtual visit: 14 minutes

## 2020-10-29 ENCOUNTER — TRANSCRIBE ORDER (OUTPATIENT)
Dept: SCHEDULING | Age: 50
End: 2020-10-29

## 2020-10-29 ENCOUNTER — TELEPHONE (OUTPATIENT)
Dept: FAMILY MEDICINE CLINIC | Age: 50
End: 2020-10-29

## 2020-10-29 DIAGNOSIS — Z12.31 VISIT FOR SCREENING MAMMOGRAM: Primary | ICD-10-CM

## 2020-10-29 NOTE — TELEPHONE ENCOUNTER
----- Message from [employer] sent at 10/29/2020 11:43 AM EDT -----  Regarding: Dr. Hoover Dakins first and last name and relationship to patient (if not the patient): n/a  Best contact number: [phone]  Preferred date and time: any day/time  Scheduled appointment date and time: non avail  Reason for appointment: CPE  Details to clarify the request:  Ms. Des Choco is requesting a CPE for insurance purposes or follow up care per Dr. Devendra Guardado.

## 2020-10-29 NOTE — TELEPHONE ENCOUNTER
Patient would like to know if Amarjit Pavon can send out requested labs A1C,Iron,cholesterol,Vit. B and B12 she can be reached @ [phone]

## 2020-11-05 ENCOUNTER — HOSPITAL ENCOUNTER (OUTPATIENT)
Dept: MAMMOGRAPHY | Age: 50
Discharge: HOME | End: 2020-11-05
Attending: FAMILY MEDICINE
Payer: COMMERCIAL

## 2020-11-05 DIAGNOSIS — Z12.31 VISIT FOR SCREENING MAMMOGRAM: ICD-10-CM

## 2020-11-05 PROCEDURE — 77063 BREAST TOMOSYNTHESIS BI: CPT

## 2020-12-14 ENCOUNTER — OFFICE VISIT (OUTPATIENT)
Dept: FAMILY MEDICINE CLINIC | Age: 50
End: 2020-12-14
Payer: COMMERCIAL

## 2020-12-14 VITALS
OXYGEN SATURATION: 97 % | TEMPERATURE: 97.1 F | SYSTOLIC BLOOD PRESSURE: 110 MMHG | HEART RATE: 90 BPM | WEIGHT: 190 LBS | HEIGHT: 64 IN | BODY MASS INDEX: 32.44 KG/M2 | DIASTOLIC BLOOD PRESSURE: 78 MMHG | RESPIRATION RATE: 18 BRPM

## 2020-12-14 DIAGNOSIS — I10 ESSENTIAL HYPERTENSION: Primary | ICD-10-CM

## 2020-12-14 DIAGNOSIS — Z51.81 ENCOUNTER FOR MEDICATION MONITORING: ICD-10-CM

## 2020-12-14 DIAGNOSIS — E55.9 HYPOVITAMINOSIS D: ICD-10-CM

## 2020-12-14 DIAGNOSIS — K50.90 CROHN'S DISEASE WITHOUT COMPLICATION, UNSPECIFIED GASTROINTESTINAL TRACT LOCATION (HCC): ICD-10-CM

## 2020-12-14 DIAGNOSIS — E78.00 HYPERCHOLESTEROLEMIA: ICD-10-CM

## 2020-12-14 DIAGNOSIS — R73.02 IGT (IMPAIRED GLUCOSE TOLERANCE): ICD-10-CM

## 2020-12-14 DIAGNOSIS — E66.9 NON MORBID OBESITY: ICD-10-CM

## 2020-12-14 DIAGNOSIS — R35.0 URINARY FREQUENCY: ICD-10-CM

## 2020-12-14 DIAGNOSIS — Z23 NEEDS FLU SHOT: ICD-10-CM

## 2020-12-14 DIAGNOSIS — Z23 NEED FOR SHINGLES VACCINE: ICD-10-CM

## 2020-12-14 LAB
BILIRUB UR QL STRIP: NEGATIVE
GLUCOSE UR-MCNC: NEGATIVE MG/DL
KETONES P FAST UR STRIP-MCNC: NEGATIVE MG/DL
PH UR STRIP: 5.5 [PH] (ref 4.6–8)
PROT UR QL STRIP: NEGATIVE
SP GR UR STRIP: 1.02 (ref 1–1.03)
UA UROBILINOGEN AMB POC: NORMAL (ref 0.2–1)
URINALYSIS CLARITY POC: CLEAR
URINALYSIS COLOR POC: YELLOW
URINE BLOOD POC: NEGATIVE
URINE LEUKOCYTES POC: NEGATIVE
URINE NITRITES POC: NEGATIVE

## 2020-12-14 PROCEDURE — 90686 IIV4 VACC NO PRSV 0.5 ML IM: CPT

## 2020-12-14 PROCEDURE — 99214 OFFICE O/P EST MOD 30 MIN: CPT | Performed by: FAMILY MEDICINE

## 2020-12-14 PROCEDURE — 81001 URINALYSIS AUTO W/SCOPE: CPT | Performed by: FAMILY MEDICINE

## 2020-12-14 PROCEDURE — 90471 IMMUNIZATION ADMIN: CPT

## 2020-12-14 RX ORDER — PANTOPRAZOLE SODIUM 40 MG/1
40 TABLET, DELAYED RELEASE ORAL
COMMUNITY
End: 2021-06-22

## 2020-12-14 NOTE — PROGRESS NOTES
Chief Complaint   Patient presents with    Hypertension     follow up             Mammogram 11/5/2020    Colonoscopy 5/16/2017 Dr. Kari Rubio repeat in 10 years          1. Have you been to the ER, urgent care clinic since your last visit? Hospitalized since your last visit? No    2. Have you seen or consulted any other health care providers outside of the [address] [place] [address] since your last visit? Include any pap smears or colon screening.  Mammogram 3/14/2019

## 2020-12-14 NOTE — PROGRESS NOTES
HISTORY OF PRESENT ILLNESS  Rosendo Murray is a 45 y.o. female. Follow up on BP. She is feeling well. She is teleworking. Lives with her dtg and son and her . Her family is working outside of the home but she is taking the precautionary measures to sanitize the common areas that they share in the home. Has hx of Crohn's disease being followed by GI. Cardiovascular Review:  The patient has hypertension. Diet and Lifestyle: generally follows a low fat low cholesterol diet, generally follows a low sodium diet, exercises sporadically  Home BP Monitoring: is not measured at home. Pertinent ROS: taking medications as instructed, no medication side effects noted, no TIA's, no chest pain on exertion, no dyspnea on exertion, no swelling of ankles, no palpitations. Glucose intolerance reveiw:  She has IGT. Diabetic ROS - further diabetic ROS: no polyuria or polydipsia, no chest pain, dyspnea or TIA's, no numbness, tingling or pain in extremities, no unusual visual symptoms. Lab review: orders written for new lab studies as appropriate; see orders. Patient Active Problem List   Diagnosis Code    Hypovitaminosis D E55.9    Herpes genitalia A60.00    Anemia D64.9    IGT (impaired glucose tolerance) R73.02    Crohn's disease without complication ([facility] D14.58    Encounter for medication monitoring Z51.81    Non morbid obesity E66.9       Current Outpatient Medications   Medication Sig Dispense Refill    pantoprazole (Protonix) 40 mg tablet Take 40 mg by mouth two (2) times daily as needed.  ustekinumab (ustekinaumab) 90 mg/mL injection Inject 90 mg subcutaneous every 42 days      amLODIPine (NORVASC) 10 mg tablet Take 1 Tab by mouth daily. 90 Tab 3    chlorthalidone (HYGROTEN) 25 mg tablet Take 1 Tab by mouth daily. 90 Tab 3    potassium chloride (KLOR-CON) 10 mEq tablet Take 1 Tab by mouth daily.  90 Tab 3    cholecalciferol, vitamin D3, (VITAMIN D3) 2,000 unit tab Take 1 Tab by mouth daily.  cyanocobalamin 1,000 mcg tablet Take 1,000 mcg by mouth daily.  ferrous sulfate 325 mg (65 mg iron) tablet Take 325 mg by mouth every other day.       PREVIDENT 5000 ENAMEL PROTECT 1.1-5 % pste APPLY TWO TIMES A DAY  3       Allergies   Allergen Reactions    Ibuprofen Other (comments)     Can't take due to Crohns    Sulfa (Sulfonamide Antibiotics) Nausea Only         Past Medical History:   Diagnosis Date    Crohn disease ([address]     5/2017    Environmental allergies     GERD (gastroesophageal reflux disease)     Herpes genitalia          Past Surgical History:   Procedure Laterality Date    HX GYN  1991    Laser- HPV         Family History   Problem Relation Age of Onset    Heart Disease Mother     Bleeding Prob Mother         was on coumadin    Stroke Father     Cancer Sister         lymphoma    Diabetes Brother     Schizophrenia Brother     Diabetes Sister     Diabetes Sister     Mental Retardation Sister     No Known Problems Sister     Other Brother         paralysis from car accident    Heart Disease Brother     Pacemaker Brother     Kidney Disease Brother     Crohn's Disease Brother        Social History     Tobacco Use    Smoking status: Never Smoker    Smokeless tobacco: Never Used   Substance Use Topics    Alcohol use: Yes     Comment: occasional        Lab Results   Component Value Date/Time    WBC 11.5 (H) 04/24/2019 04:26 PM    HGB 12.9 04/24/2019 04:26 PM    HCT 37.3 04/24/2019 04:26 PM    PLATELET 756 59/31/6654 04:26 PM    MCV 73 (L) 04/24/2019 04:26 PM     Lab Results   Component Value Date/Time    Cholesterol, total 204 (H) 10/23/2019 03:05 PM    HDL Cholesterol 45 10/23/2019 03:05 PM    LDL, calculated 120 (H) 10/23/2019 03:05 PM    Triglyceride 193 (H) 10/23/2019 03:05 PM     Lab Results   Component Value Date/Time    TSH 1.250 09/02/2015 10:21 AM      Lab Results   Component Value Date/Time    Sodium 136 10/23/2019 03:05 PM    Potassium 3.7 10/23/2019 03:05 PM    Chloride 96 10/23/2019 03:05 PM    CO2 27 10/23/2019 03:05 PM    Glucose 81 10/23/2019 03:05 PM    BUN 10 10/23/2019 03:05 PM    Creatinine 0.79 10/23/2019 03:05 PM    BUN/Creatinine ratio 13 10/23/2019 03:05 PM    GFR est  10/23/2019 03:05 PM    GFR est non-AA 88 10/23/2019 03:05 PM    Calcium 10.2 10/23/2019 03:05 PM    Bilirubin, total 0.5 04/24/2019 04:26 PM    ALT (SGPT) 13 04/24/2019 04:26 PM    Alk. phosphatase 88 04/24/2019 04:26 PM    Protein, total 7.7 04/24/2019 04:26 PM    Albumin 4.8 04/24/2019 04:26 PM    A-G Ratio 1.7 04/24/2019 04:26 PM      Lab Results   Component Value Date/Time    Hemoglobin A1c 6.4 (H) 03/05/2014 12:58 PM    Hemoglobin A1c (POC) 5.9 10/23/2019 03:08 PM         Review of Systems   Constitutional: Negative for malaise/fatigue. HENT: Negative for congestion. Eyes: Negative for blurred vision. Respiratory: Negative for cough and shortness of breath. Cardiovascular: Negative for chest pain, palpitations and leg swelling. Gastrointestinal: Negative for abdominal pain, constipation and heartburn. Genitourinary: Positive for frequency (some increased frequency over the past severaal weeks. She had tried to cut back on her fliuds close to bedtime. ). Negative for dysuria and urgency. Musculoskeletal: Negative for back pain and joint pain. Neurological: Negative for dizziness, tingling and headaches. Endo/Heme/Allergies: Negative for environmental allergies. Psychiatric/Behavioral: Negative for depression. The patient does not have insomnia. Physical Exam  Vitals signs and nursing note reviewed. Constitutional:       Appearance: Normal appearance. She is well-developed.       Comments: /78   Pulse 90   Temp 97.1 °F (36.2 °C) (Temporal)   Resp 18   Ht 5' 4\" (1.626 m)   Wt 190 lb (86.2 kg)   SpO2 97%   BMI 32.61 kg/m²    HENT:      Right Ear: Tympanic membrane and ear canal normal.      Left Ear: Tympanic membrane and ear canal normal.      Nose: No mucosal edema or rhinorrhea. Neck:      Musculoskeletal: Normal range of motion and neck supple. Thyroid: No thyromegaly. Cardiovascular:      Rate and Rhythm: Normal rate and regular rhythm. Heart sounds: Normal heart sounds. No gallop. Pulmonary:      Effort: Pulmonary effort is normal.      Breath sounds: Normal breath sounds. Abdominal:      General: Bowel sounds are normal.      Palpations: Abdomen is soft. There is no mass. Tenderness: There is no abdominal tenderness. Musculoskeletal: Normal range of motion. General: No swelling. Lymphadenopathy:      Cervical: No cervical adenopathy. Skin:     General: Skin is warm and dry. Neurological:      General: No focal deficit present. Mental Status: She is alert and oriented to person, place, and time. Psychiatric:         Mood and Affect: Mood normal.         Thought Content: Thought content normal.         ASSESSMENT and PLAN  Diagnoses and all orders for this visit:    1. Essential hypertension  Discussed sodium restriction, high k rich diet, maintaining ideal body weight and regular exercise program such as daily walking 30 min perday 4-5 times per week, as physiologic means to achieve blood pressure control. Medication compliance advised. 2. Hypercholesterolemia  -     LIPID PANEL; Future    3. IGT (impaired glucose tolerance)  -     HEMOGLOBIN A1C WITH EAG; Future    4. Crohn's disease without complication, unspecified gastrointestinal tract location [facility]  -     FERRITIN; Future  -     VITAMIN B12; Future  -     IRON PROFILE; Future    5. Hypovitaminosis D  -     VITAMIN D, 25 HYDROXY; Future    6. Urinary frequency  -     AMB POC URINALYSIS DIP STICK AUTO W/ MICRO    7. Encounter for medication monitoring  -     CBC W/O DIFF; Future  -     METABOLIC PANEL, COMPREHENSIVE; Future    8. Non morbid obesity  I have reviewed/discussed the above normal BMI with the patient.   I have recommended the following interventions: dietary management education, guidance, and counseling . 9. Needs flu shot  -     INFLUENZA VIRUS VAC QUAD,SPLIT,PRESV FREE SYRINGE IM    10. Need for Shingle Vaccine  -     varicella-zoster recombinant, PF, (SHINGRIX) 50 mcg/0.5 mL susr injection; 0.5 mL by IntraMUSCular route once for 1 dose. Repeat second dose in 6 months. Follow-up and Dispositions    · Return in about 6 months (around 6/14/2021). reviewed diet, exercise and weight control  cardiovascular risk and specific lipid/LDL goals reviewed  reviewed medications and side effects in detail  specific diabetic recommendations: low cholesterol diet, weight control and daily exercise discussed and glycohemoglobin and other lab monitoring discussed     I have discussed diagnosis listed in this note with pt and/or family. I have discussed treatment plans and options and the risk/benefit analysis of those options, including safe use of medications and possible medication side effects. Through the use of shared decision making we have agreed to the above plan. The patient has received an after-visit summary and questions were answered concerning future plans and follow up. Advise pt of any urgent changes then to proceed to the ER.

## 2020-12-14 NOTE — PROGRESS NOTES
Order placed for flu injection  per Verbal Order from Dr. Lance Ramirez on 12/14/2020 due to need    Flu injection 0.5 ml given in left arm IM, no reaction noted. Ronold Kanner

## 2020-12-19 LAB
25(OH)D3+25(OH)D2 SERPL-MCNC: 29.5 NG/ML (ref 30–100)
ALBUMIN SERPL-MCNC: 4.2 G/DL (ref 3.8–4.9)
ALBUMIN/GLOB SERPL: 1.4 {RATIO} (ref 1.2–2.2)
ALP SERPL-CCNC: 103 IU/L (ref 39–117)
ALT SERPL-CCNC: 10 IU/L (ref 0–32)
AST SERPL-CCNC: 12 IU/L (ref 0–40)
BILIRUB SERPL-MCNC: 0.4 MG/DL (ref 0–1.2)
BUN SERPL-MCNC: 10 MG/DL (ref 6–24)
BUN/CREAT SERPL: 14 (ref 9–23)
CALCIUM SERPL-MCNC: 10 MG/DL (ref 8.7–10.2)
CHLORIDE SERPL-SCNC: 95 MMOL/L (ref 96–106)
CHOLEST SERPL-MCNC: 213 MG/DL (ref 100–199)
CO2 SERPL-SCNC: 31 MMOL/L (ref 20–29)
CREAT SERPL-MCNC: 0.72 MG/DL (ref 0.57–1)
ERYTHROCYTE [DISTWIDTH] IN BLOOD BY AUTOMATED COUNT: 15.5 % (ref 11.7–15.4)
EST. AVERAGE GLUCOSE BLD GHB EST-MCNC: 134 MG/DL
FERRITIN SERPL-MCNC: 86 NG/ML (ref 15–150)
GLOBULIN SER CALC-MCNC: 3 G/DL (ref 1.5–4.5)
GLUCOSE SERPL-MCNC: 118 MG/DL (ref 65–99)
HBA1C MFR BLD: 6.3 % (ref 4.8–5.6)
HCT VFR BLD AUTO: 39.5 % (ref 34–46.6)
HDLC SERPL-MCNC: 51 MG/DL
HGB BLD-MCNC: 12.7 G/DL (ref 11.1–15.9)
INTERPRETATION, 910389: NORMAL
IRON SATN MFR SERPL: 13 % (ref 15–55)
IRON SERPL-MCNC: 45 UG/DL (ref 27–159)
LDLC SERPL CALC-MCNC: 141 MG/DL (ref 0–99)
MCH RBC QN AUTO: 24.2 PG (ref 26.6–33)
MCHC RBC AUTO-ENTMCNC: 32.2 G/DL (ref 31.5–35.7)
MCV RBC AUTO: 75 FL (ref 79–97)
PLATELET # BLD AUTO: 385 X10E3/UL (ref 150–450)
POTASSIUM SERPL-SCNC: 3.4 MMOL/L (ref 3.5–5.2)
PROT SERPL-MCNC: 7.2 G/DL (ref 6–8.5)
RBC # BLD AUTO: 5.25 X10E6/UL (ref 3.77–5.28)
SODIUM SERPL-SCNC: 138 MMOL/L (ref 134–144)
TIBC SERPL-MCNC: 350 UG/DL (ref 250–450)
TRIGL SERPL-MCNC: 118 MG/DL (ref 0–149)
UIBC SERPL-MCNC: 305 UG/DL (ref 131–425)
VIT B12 SERPL-MCNC: 591 PG/ML (ref 232–1245)
VLDLC SERPL CALC-MCNC: 21 MG/DL (ref 5–40)
WBC # BLD AUTO: 8.7 X10E3/UL (ref 3.4–10.8)

## 2021-02-25 ENCOUNTER — TELEPHONE (OUTPATIENT)
Dept: FAMILY MEDICINE CLINIC | Age: 51
End: 2021-02-25

## 2021-02-25 NOTE — TELEPHONE ENCOUNTER
----- Message from Deonna Montero sent at 2/25/2021  8:41 AM EST -----  Regarding: Dr. Nicole Obregon first and last name: pt      Reason for call: Question      Callback required yes/no and why: yes, to confirm      Best contact number(s): [phone]        Details to clarify the request: pt needs finger x-ray has appointment 2/26/21 and wants to know if she can come in to get x-ray also.       Deonna Montero

## 2021-02-26 ENCOUNTER — OFFICE VISIT (OUTPATIENT)
Dept: FAMILY MEDICINE CLINIC | Age: 51
End: 2021-02-26

## 2021-02-26 VITALS
TEMPERATURE: 96.9 F | SYSTOLIC BLOOD PRESSURE: 116 MMHG | HEART RATE: 88 BPM | OXYGEN SATURATION: 96 % | RESPIRATION RATE: 16 BRPM | WEIGHT: 189 LBS | DIASTOLIC BLOOD PRESSURE: 84 MMHG | HEIGHT: 64 IN | BODY MASS INDEX: 32.27 KG/M2

## 2021-02-26 DIAGNOSIS — M20.021 BOUTONNIERE DEFORMITY OF FINGER, RIGHT: ICD-10-CM

## 2021-02-26 DIAGNOSIS — M19.041: Primary | ICD-10-CM

## 2021-02-26 PROCEDURE — 99212 OFFICE O/P EST SF 10 MIN: CPT | Performed by: FAMILY MEDICINE

## 2021-02-26 RX ORDER — PREDNISONE 10 MG/1
10 TABLET ORAL 2 TIMES DAILY
Qty: 10 TAB | Refills: 0 | Status: SHIPPED | OUTPATIENT
Start: 2021-02-26 | End: 2021-03-03

## 2021-02-26 RX ORDER — MELOXICAM 15 MG/1
15 TABLET ORAL DAILY
Status: CANCELLED | OUTPATIENT
Start: 2021-02-26

## 2021-02-26 NOTE — PROGRESS NOTES
Chief Complaint   Patient presents with    Finger Swelling     patient c/o right little finger swelling and pain for about 2 weeks                 1. Have you been to the ER, urgent care clinic since your last visit? Hospitalized since your last visit? No    2. Have you seen or consulted any other health care providers outside of the [address] [place] [address] since your last visit? Include any pap smears or colon screening.  Mammogram 3/14/2019

## 2021-02-26 NOTE — PROGRESS NOTES
HISTORY OF PRESENT ILLNESS  Genesis Qiu is a 45 y.o. female. HPI   C/o swelling in the pinky finger on the right hand over the past 2 weeks. Has had some increased pain in the finger in the past few days. She can not straighten the finger completely. Can't bend the finger fully and sometimes get stuck in a bent position. 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 No injury. Has not taken anything for the pain. No redness. Rest of the hand also feels achy but no swelling of the joints. Patient Active Problem List   Diagnosis Code    Hypovitaminosis D E55.9    Herpes genitalia A60.00    Anemia D64.9    IGT (impaired glucose tolerance) R73.02    [facility] D02.47    Encounter for medication monitoring Z51.81    Non morbid obesity E66.9       Current Outpatient Medications   Medication Sig Dispense Refill    pantoprazole (Protonix) 40 mg tablet Take 40 mg by mouth two (2) times daily as needed.  ustekinumab (ustekinaumab) 90 mg/mL injection Inject 90 mg subcutaneous every 42 days      amLODIPine (NORVASC) 10 mg tablet Take 1 Tab by mouth daily. 90 Tab 3    chlorthalidone (HYGROTEN) 25 mg tablet Take 1 Tab by mouth daily. 90 Tab 3    potassium chloride (KLOR-CON) 10 mEq tablet Take 1 Tab by mouth daily. 90 Tab 3    cholecalciferol, vitamin D3, (VITAMIN D3) 2,000 unit tab Take 1 Tab by mouth daily.  cyanocobalamin 1,000 mcg tablet Take 1,000 mcg by mouth daily.  ferrous sulfate 325 mg (65 mg iron) tablet Take 325 mg by mouth every other day.       PREVIDENT 5000 ENAMEL PROTECT 1.1-5 % pste APPLY TWO TIMES A DAY  3       Allergies   Allergen Reactions    Ibuprofen Other (comments)     Can't take due to Crohns    Sulfa (Sulfonamide Antibiotics) Nausea Only       Past Medical History:   Diagnosis Date    Cro[facility] disease ([facility]     5/2017    Environmental allergies     GERD (gastroesophageal reflux disease)     Herpes genitalia        Past Surgical History:   Procedure Laterality Date    HX GYN  1991    Laser- HPV       Family History   Problem Relation Age of Onset    Heart Disease Mother     Bleeding Prob Mother         was on coumadin    Stroke Father     Cancer Sister         lymphoma    Diabetes Brother     Schizophrenia Brother     Diabetes Sister     Diabetes Sister     Mental Retardation Sister     No Known Problems Sister     Other Brother         paralysis from car accident    Heart Disease Brother     Pacemaker Brother     Kidney Disease Brother     Crohn's Disease Brother        Social History     Tobacco Use    Smoking status: Never Smoker    Smokeless tobacco: Never Used   Substance Use Topics    Alcohol use: Yes     Frequency: Monthly or less     Drinks per session: 1 or 2     Binge frequency: Never        Lab Results   Component Value Date/Time    WBC 8.7 12/18/2020 09:19 AM    HGB 12.7 12/18/2020 09:19 AM    HCT 39.5 12/18/2020 09:19 AM    PLATELET 249 05/65/1050 09:19 AM    MCV 75 (L) 12/18/2020 09:19 AM     Lab Results   Component Value Date/Time    Cholesterol, total 213 (H) 12/18/2020 09:19 AM    HDL Cholesterol 51 12/18/2020 09:19 AM    LDL, calculated 141 (H) 12/18/2020 09:19 AM    LDL, calculated 120 (H) 10/23/2019 03:05 PM    Triglyceride 118 12/18/2020 09:19 AM     Lab Results   Component Value Date/Time    TSH 1.250 09/02/2015 10:21 AM      Lab Results   Component Value Date/Time    Sodium 138 12/18/2020 09:19 AM    Potassium 3.4 (L) 12/18/2020 09:19 AM    Chloride 95 (L) 12/18/2020 09:19 AM    CO2 31 (H) 12/18/2020 09:19 AM    Glucose 118 (H) 12/18/2020 09:19 AM    BUN 10 12/18/2020 09:19 AM    Creatinine 0.72 12/18/2020 09:19 AM    BUN/Creatinine ratio 14 12/18/2020 09:19 AM    GFR est  12/18/2020 09:19 AM    GFR est non-AA 97 12/18/2020 09:19 AM    Calcium 10.0 12/18/2020 09:19 AM    Bilirubin, total 0.4 12/18/2020 09:19 AM    ALT (SGPT) 10 12/18/2020 09:19 AM    Alk. phosphatase 103 12/18/2020 09:19 AM    Protein, total 7.2 12/18/2020 09:19 AM    Albumin 4.2 12/18/2020 09:19 AM    A-G Ratio 1.4 12/18/2020 09:19 AM      Lab Results   Component Value Date/Time    Hemoglobin A1c 6.3 (H) 12/18/2020 09:19 AM    Hemoglobin A1c (POC) 5.9 10/23/2019 03:08 PM         Review of Systems   Constitutional: Negative for malaise/fatigue. HENT: Negative for congestion. Eyes: Negative for blurred vision. Respiratory: Negative for cough and shortness of breath. Cardiovascular: Negative for chest pain, palpitations and leg swelling. Gastrointestinal: Negative for abdominal pain, constipation and heartburn. Genitourinary: Negative for dysuria, frequency and urgency. Musculoskeletal: Negative for back pain and joint pain. Neurological: Negative for dizziness, tingling and headaches. Endo/Heme/Allergies: Negative for environmental allergies. Psychiatric/Behavioral: Negative for depression. The patient does not have insomnia. Physical Exam  Vitals signs and nursing note reviewed. Constitutional:       Appearance: Normal appearance. She is well-developed. Comments: /84 (BP 1 Location: Left arm, BP Patient Position: Sitting)   Pulse 88   Temp 96.9 °F (36.1 °C) (Temporal)   Resp 16   Ht 5' 4\" (1.626 m)   Wt 189 lb (85.7 kg)   LMP 01/30/2021   SpO2 96%   BMI 32.44 kg/m²      Neck:      Thyroid: No thyromegaly. Cardiovascular:      Rate and Rhythm: Normal rate and regular rhythm. Heart sounds: Normal heart sounds. No gallop. Pulmonary:      Effort: Pulmonary effort is normal.      Breath sounds: Normal breath sounds. Musculoskeletal:      Comments: Swelling and tenderness of the finger and the PIP joint. Not red or warm to touch. Can not fully extend finger and can not fully flex finger d/t swelling deformity at the joint. Neurological:      Mental Status: She is alert.          ASSESSMENT and PLAN  Diagnoses and all orders for this visit:    1. Degenerative arthritis of little finger of right hand//  2. Boutonniere deformity of finger, right  -     REFERRAL TO ORTHOPEDICS  -     XR 5TH FINGER RT MIN 2 V; Future  -     predniSONE (DELTASONE) 10 mg tablet; Take 10 mg by mouth two (2) times a day for 5 days. Can alternate ice with heat as needed. I have discussed diagnosis listed in this note with pt and/or family. I have discussed treatment plans and options and the risk/benefit analysis of those options, including safe use of medications and possible medication side effects. Through the use of shared decision making we have agreed to the above plan. The patient has received an after-visit summary and questions were answered concerning future plans and follow up. Advise pt of any urgent changes then to proceed to the ER.

## 2021-02-26 NOTE — PATIENT INSTRUCTIONS
Finger: Exercises Introduction Here are some examples of exercises for you to try. The exercises may be suggested for a condition or for rehabilitation. Start each exercise slowly. Ease off the exercises if you start to have pain. You will be told when to start these exercises and which ones will work best for you. How to do the exercises Tendon glimimi 1. In this exercise, the steps follow one another to make a continuous movement. 2. With one hand, point your fingers and thumb straight up. Your wrist should be relaxed, following the line of your fingers and thumb. 3. Curl your fingers so that the top two joints in them are bent, and your fingers wrap down. Your fingertips should touch or be near the base of your fingers. Your fingers will look like a hook. 4. Make a fist by bending your knuckles. Your thumb can gently rest against your index (pointing) finger. 5. Unwind your fingers slightly so that your fingertips can touch the base of your palm. Your thumb can rest against your index finger. Hold that position for about 6 seconds. 6. Move back to your starting position, with your fingers and thumb pointing up. 7. Repeat the series of motions 8 to 12 times. 8. Switch hands, and repeat steps 1 through 6. Thumb flexion/extension 1. Place your forearm and hand on a table with your thumb pointing up. 2. Bend your thumb downward and across your palm so that your thumb touches the base of your little finger. Hold that position for about 6 seconds. Then straighten your thumb. 3. Repeat 8 to 12 times. 4. Switch hands, and repeat steps 1 through 3. Thumb abduction/adduction 1. With one hand, point your fingers and thumb straight up. Your wrist should be relaxed, following the line of your fingers and thumb. 2. Pull your thumb away from your palm as far as you can. Hold that position for about 6 seconds. Then slowly move your thumb back to the starting position, with your thumb resting against your index (pointing) finger. 3. Repeat 8 to 12 times. 4. Switch hands, and repeat steps 1 through 3. Finger opposition 1. With one hand, point your fingers and thumb straight up. Your wrist should be relaxed, following the line of your fingers and thumb. 2. Touch your thumb to each finger, one finger at a time. This will look like an \"okay\" sign, but try to keep your other fingers straight and pointing upward as much as you can. 3. Repeat 8 to 12 times. 4. Switch hands, and repeat steps 1 through 3. Follow-up care is a key part of your treatment and safety. Be sure to make and go to all appointments, and call your doctor if you are having problems. It's also a good idea to know your test results and keep a list of the medicines you take. Where can you learn more? Go to http://www.gray.com/ Enter K460 in the search box to learn more about \"Finger: Exercises. \" Current as of: March 2, 2020               Content Version: 12.6 © 1781-4956 [employer]. Care instructions adapted under license by [employer] (which disclaims liability or warranty for this information). If you have questions about a medical condition or this instruction, always ask your healthcare professional. [address] any warranty or liability for your use of this information.

## 2021-03-01 ENCOUNTER — TELEPHONE (OUTPATIENT)
Dept: FAMILY MEDICINE CLINIC | Age: 51
End: 2021-03-01

## 2021-03-02 ENCOUNTER — TELEPHONE (OUTPATIENT)
Dept: FAMILY MEDICINE CLINIC | Age: 51
End: 2021-03-02

## 2021-03-02 NOTE — TELEPHONE ENCOUNTER
Made appointment with Dr. Malinda Castillo on March 24 th at 8:10 am --little finger right hand     [address] [address]  [address]     [phone]    Bring x rays, insurance card and photo ID    patient notified

## 2021-03-02 NOTE — TELEPHONE ENCOUNTER
Advised patient referral coordinator is working on appointment with hand specialist, advised patient to call back beginning of next week if we have not called back with appointment

## 2021-04-02 ENCOUNTER — TELEPHONE (OUTPATIENT)
Dept: FAMILY MEDICINE CLINIC | Age: 51
End: 2021-04-02

## 2021-04-02 NOTE — TELEPHONE ENCOUNTER
Patient states that she would like a call from [address] [place] [address] regarding the covid shot interfering with her medication she can be reached @ [phone]

## 2021-04-02 NOTE — TELEPHONE ENCOUNTER
Patient has concerns from what she I shearing in the news concerning taking Covid Vaccine due to Crohns disease, advised patient to call her GI for advice, she agreed.

## 2021-04-05 ENCOUNTER — OFFICE VISIT (OUTPATIENT)
Dept: FAMILY MEDICINE CLINIC | Age: 51
End: 2021-04-05
Payer: COMMERCIAL

## 2021-04-05 VITALS
HEIGHT: 64 IN | RESPIRATION RATE: 16 BRPM | DIASTOLIC BLOOD PRESSURE: 86 MMHG | WEIGHT: 188 LBS | SYSTOLIC BLOOD PRESSURE: 121 MMHG | BODY MASS INDEX: 32.1 KG/M2 | TEMPERATURE: 98.3 F | OXYGEN SATURATION: 98 % | HEART RATE: 89 BPM

## 2021-04-05 DIAGNOSIS — R73.02 IGT (IMPAIRED GLUCOSE TOLERANCE): ICD-10-CM

## 2021-04-05 DIAGNOSIS — E55.9 HYPOVITAMINOSIS D: ICD-10-CM

## 2021-04-05 DIAGNOSIS — E66.9 NON MORBID OBESITY: ICD-10-CM

## 2021-04-05 DIAGNOSIS — Z51.81 ENCOUNTER FOR MEDICATION MONITORING: ICD-10-CM

## 2021-04-05 DIAGNOSIS — K50.90 CROHN'S DISEASE WITHOUT COMPLICATION, UNSPECIFIED GASTROINTESTINAL TRACT LOCATION (HCC): ICD-10-CM

## 2021-04-05 DIAGNOSIS — N95.1 PERIMENOPAUSE: ICD-10-CM

## 2021-04-05 DIAGNOSIS — Z11.59 NEED FOR HEPATITIS C SCREENING TEST: ICD-10-CM

## 2021-04-05 DIAGNOSIS — E78.00 HYPERCHOLESTEROLEMIA: ICD-10-CM

## 2021-04-05 DIAGNOSIS — Z00.00 ROUTINE GENERAL MEDICAL EXAMINATION AT A HEALTH CARE FACILITY: Primary | ICD-10-CM

## 2021-04-05 DIAGNOSIS — I10 ESSENTIAL HYPERTENSION: ICD-10-CM

## 2021-04-05 PROCEDURE — 99396 PREV VISIT EST AGE 40-64: CPT | Performed by: FAMILY MEDICINE

## 2021-04-05 NOTE — PROGRESS NOTES
Subjective:   45 y.o. female for Well Woman Check. 01/19/2021. Had pap done by GYN in November 2020. She is feeling well. She is teleworking. Lives with her dtg and son and her . Her family is working outside of the home but she is taking the precautionary measures to sanitize the common areas that they share in the home. Has hx of Crohn's disease being followed by GI. Cardiovascular Review:  The patient has hypertension. Diet and Lifestyle: generally follows a low fat low cholesterol diet, generally follows a low sodium diet, exercises sporadically  Home BP Monitoring: is not measured at home. Pertinent ROS: taking medications as instructed, no medication side effects noted, no TIA's, no chest pain on exertion, no dyspnea on exertion, no swelling of ankles, no palpitations. Glucose intolerance reveiw:  She has IGT. Diabetic ROS - further diabetic ROS: no polyuria or polydipsia, no chest pain, dyspnea or TIA's, no numbness, tingling or pain in extremities, no unusual visual symptoms. Lab review: orders written for new lab studies as appropriate; see orders. Patient Active Problem List   Diagnosis Code    Hypovitaminosis D E55.9    Herpes genitalia A60.00    Anemia D64.9    IGT (impaired glucose tolerance) R73.02    Crohn's disease without complication ([facility] H99.09    Encounter for medication monitoring Z51.81    Non morbid obesity E66.9       Current Outpatient Medications   Medication Sig Dispense Refill    pantoprazole (Protonix) 40 mg tablet Take 40 mg by mouth two (2) times daily as needed.  ustekinumab (ustekinaumab) 90 mg/mL injection Inject 90 mg subcutaneous every 42 days      amLODIPine (NORVASC) 10 mg tablet Take 1 Tab by mouth daily. 90 Tab 3    chlorthalidone (HYGROTEN) 25 mg tablet Take 1 Tab by mouth daily. 90 Tab 3    potassium chloride (KLOR-CON) 10 mEq tablet Take 1 Tab by mouth daily.  90 Tab 3    cholecalciferol, vitamin D3, (VITAMIN D3) 2,000 unit tab Take 1 Tab by mouth daily.  cyanocobalamin 1,000 mcg tablet Take 1,000 mcg by mouth daily.  ferrous sulfate 325 mg (65 mg iron) tablet Take 325 mg by mouth every other day.  PREVIDENT 5000 ENAMEL PROTECT 1.1-5 % pste APPLY TWO TIMES A DAY  3       Allergies   Allergen Reactions    Ibuprofen Other (comments)     Can't take due to Crohns    Sulfa (Sulfonamide Antibiotics) Nausea Only       Past Medical History:   Diagnosis Date    Crohn disease ([address]     5/2017    Environmental allergies     GERD (gastroesophageal reflux disease)     Herpes genitalia        Past Surgical History:   Procedure Laterality Date    HX GYN  1991    Laser- HPV       Family History   Problem Relation Age of Onset    Heart Disease Mother     Bleeding Prob Mother         was on coumadin    Stroke Father     Cancer Sister         lymphoma    Diabetes Brother     Schizophrenia Brother     Diabetes Sister     Diabetes Sister     Mental Retardation Sister     No Known Problems Sister     Other Brother         paralysis from car accident    Heart Disease Brother     Pacemaker Brother     Kidney Disease Brother     Crohn's Disease Brother        Social History     Tobacco Use    Smoking status: Never Smoker    Smokeless tobacco: Never Used   Substance Use Topics    Alcohol use: Yes     Frequency: Monthly or less     Drinks per session: 1 or 2     Binge frequency: Never          ROS:  Feeling well. No dyspnea or chest pain on exertion. No abdominal pain, change in bowel habits, black or bloody stools. No urinary tract symptoms. GYN ROS: no breast pain or new or enlarging lumps on self exam, no discharge or pelvic pain, she complains of some occasional hot flashes which comes and goes. Seems worse at night. Periods have been irregular. Last periods was in January. No neurological complaints.     Objective:       Visit Vitals  /86 (BP 1 Location: Left arm, BP Patient Position: Sitting)   Pulse 89   Temp 98.3 °F (36.8 °C) (Temporal)   Resp 16   Ht 5' 4\" (1.626 m)   Wt 188 lb (85.3 kg)   LMP 01/19/2021   SpO2 98%   BMI 32.27 kg/m²       The patient appears well, alert, oriented x 3, in no distress. ENT normal.  Neck supple. No adenopathy or thyromegaly. RADHA. Lungs are clear, good air entry, no wheezes, rhonchi or rales. S1 and S2 normal, no murmurs, regular rate and rhythm. Abdomen soft without tenderness, guarding, mass or organomegaly. Extremities show no edema, normal peripheral pulses. Neurological is normal, no focal findings. BREAST EXAM: breasts appear normal, no suspicious masses, no skin or nipple changes or axillary nodes    PELVIC EXAM: RECTAL: normal rectal, no masses, guaiac negative stool obtained    Assessment/Plan:   well woman  mammogram  pap smear  counseled on breast self exam, mammography screening, menopause, osteoporosis and adequate intake of calcium and vitamin D  additional lab tests per orders  Diagnoses and all orders for this visit:    1. Routine general medical examination at a health care facility    2. Essential hypertension  Discussed sodium restriction, high k rich diet, maintaining ideal body weight and regular exercise program such as daily walking 30 min perday 4-5 times per week, as physiologic means to achieve blood pressure control. Medication compliance advised. 3. Hypercholesterolemia  Continue to monitor. Work on diet and exercise.  -     LIPID PANEL; Future    4. IGT (impaired glucose tolerance)  Continue to monitor. Work on diet and exercise.  -     HEMOGLOBIN A1C WITH EAG; Future    5. Crohn's disease without complication, unspecified gastrointestinal tract location ([facility]  Stable     6. Hypovitaminosis D  Continue on supplement. 7. Encounter for medication monitoring  -     METABOLIC PANEL, BASIC; Future    8. Non morbid obesity  I have reviewed/discussed the above normal BMI with the patient.   I have recommended the following interventions: dietary management education, guidance, and counseling . 9. Need for hepatitis C screening test  -     HEPATITIS C AB; Future    10. Perimenopause  Monitor. Can try [facility] for hot flashes. Follow-up and Dispositions    · Return in about 4 months (around 8/5/2021). reviewed diet, exercise and weight control  cardiovascular risk and specific lipid/LDL goals reviewed  reviewed medications and side effects in detail  specific diabetic recommendations: low cholesterol diet, weight control and daily exercise discussed and glycohemoglobin and other lab monitoring discussed. I have discussed diagnosis listed in this note with pt and/or family. I have discussed treatment plans and options and the risk/benefit analysis of those options, including safe use of medications and possible medication side effects. Through the use of shared decision making we have agreed to the above plan. The patient has received an after-visit summary and questions were answered concerning future plans and follow up. Advise pt of any urgent changes then to proceed to the ER.

## 2021-04-05 NOTE — PROGRESS NOTES
Chief Complaint   Patient presents with    Complete Physical     LMP 1/19/2021         Patient states she goes to [facility] for her pap and pelvic and had in 11/2021. Mammogram 11/5/2020    Colonoscopy 5/16/2017 Dr. Henry Short repeat in 10 years          1. Have you been to the ER, urgent care clinic since your last visit? Hospitalized since your last visit? No    2. Have you seen or consulted any other health care providers outside of the [address] [place] [address] since your last visit? Include any pap smears or colon screening.

## 2021-04-06 LAB
BUN SERPL-MCNC: 10 MG/DL (ref 6–24)
BUN/CREAT SERPL: 10 (ref 9–23)
CALCIUM SERPL-MCNC: 10 MG/DL (ref 8.7–10.2)
CHLORIDE SERPL-SCNC: 99 MMOL/L (ref 96–106)
CHOLEST SERPL-MCNC: 215 MG/DL (ref 100–199)
CO2 SERPL-SCNC: 26 MMOL/L (ref 20–29)
CREAT SERPL-MCNC: 1 MG/DL (ref 0.57–1)
EST. AVERAGE GLUCOSE BLD GHB EST-MCNC: 143 MG/DL
GLUCOSE SERPL-MCNC: 158 MG/DL (ref 65–99)
HBA1C MFR BLD: 6.6 % (ref 4.8–5.6)
HCV AB S/CO SERPL IA: <0.1 S/CO RATIO (ref 0–0.9)
HDLC SERPL-MCNC: 39 MG/DL
IMP & REVIEW OF LAB RESULTS: NORMAL
LDLC SERPL CALC-MCNC: 132 MG/DL (ref 0–99)
Lab: NORMAL
POTASSIUM SERPL-SCNC: 3.8 MMOL/L (ref 3.5–5.2)
SODIUM SERPL-SCNC: 139 MMOL/L (ref 134–144)
TRIGL SERPL-MCNC: 246 MG/DL (ref 0–149)
VLDLC SERPL CALC-MCNC: 44 MG/DL (ref 5–40)

## 2021-04-15 ENCOUNTER — TELEPHONE (OUTPATIENT)
Dept: FAMILY MEDICINE CLINIC | Age: 51
End: 2021-04-15

## 2021-04-15 RX ORDER — METFORMIN HYDROCHLORIDE 500 MG/1
TABLET, EXTENDED RELEASE ORAL
Qty: 120 TAB | Refills: 3 | Status: SHIPPED | OUTPATIENT
Start: 2021-04-15 | End: 2021-05-04

## 2021-04-15 NOTE — TELEPHONE ENCOUNTER
Metformin ER 500mg Take one tab daily with breakfast for the first week. Then increase to one tab twice a day with meals for week 2, then increase to 2 tabs twice a day with meals(breakfast and dinner) called into [facility] on file.

## 2021-04-29 ENCOUNTER — OFFICE VISIT (OUTPATIENT)
Dept: FAMILY MEDICINE CLINIC | Age: 51
End: 2021-04-29

## 2021-04-29 DIAGNOSIS — R73.02 IGT (IMPAIRED GLUCOSE TOLERANCE): Primary | ICD-10-CM

## 2021-04-29 NOTE — PROGRESS NOTES
Pt presents for new diagnosis diabetes education, referred by Dr. Braeden Cervantes, who she saw on 4/5 at which time A1c was 6.6% up from a previous prediabetes A1c level for sometime. Taking metformin 500 mg twice daily with food and titrating up on Rx - tolerating so far  Had vaccine 2nd dose of COVID vaccine Saturday and doing fine    Working from home - not active last year    Diabetes in her family - lots of family members - long ago amputations     Challenge managing diet due toCrohn's disease - if has a flare up - she will eat a potato, avoid greasy / fried foods, can't eat peanuts or popcorn   Can eat salads, but vinegar / olive oil and fruit vs. Heavy dressings   Can eat red meat   On Stelara now for Crohn's   Goes on budesonide / prednisone on and off for Crohn's also - discussed with patient to be aware that sugars may increase when on steroids  Hugh Jones MD for Crohn's - been seeing him for 5 years     Daily Routine:  2 adult children and pt in home - dter in college, son may move out soon  Working from home  -  /    Irregular eating  B: chicken hash brown bowl chick gonzalo   L: varies - works through lunch - salad, greek yogurt, not many sandwiches or bread; leftovers; may skip lunch   D: oven roasted veggies, with smoked turkey  Snacks:  Slices of cheese, boiled eggs   Drinks: juice sometimes, water, not many sodas - sometimes ginger ale, sweet tea - now diluting it   Doesn't eat sweets often - does like fruit, on occasion a slice of pie or cake, mini ice cream sandwich     Activity:   Was 176 pounds in March 2020 due to diet changes and exercise, then [place] hit  Starting to walk again, gym with daughter in 2 weeks when vaccine full immunity     Pt's weight is down today due to some of the changes she has made to her diet!      Education Today - New Diabetes Diagnosis:  -Nutrition: utilized \"Planning Healthy Meals\" Education Materials to review healthy plate, Carbohydrate and Non-Carbohydrate foods, Carbohydrate content for foods / serving sizes / 15 grams = 1 Carb serving, reading the nutrition label / focusing on total carbs and serving size, portion sizes / portion control of carbs, healthy snacks that are low carb, discussion of fruits / portion sizes to target  Provided several more written education pamphlets on nutrition / meal planning   -SMBG:potentially in the future   -Goals: SMBGs, A1c - education hand outs provided   -Signs and Symptoms of Hyperglycemia and Hypoglycemia and what to do - education hand outs provided  -Exercise: positive impact on blood sugar control  -Complications Avoidance - controlling blood glucose to prevent complications  -Monitoring recommended including eye exam, foot exam, microalbumin, kindey function, blood pressure, cholesterol, vaccines    Goals set for exercise and diet regarding juices. Continue with metformin. Congratulated pt on efforts. Sees PCP in one month and follow up with me as desired.      Silvia Feliz, PHARMD, [facility] Only     CPA in place: YES    Recommendation Provided To: Patient/Caregiver: 1 via In person   Time Spent (min): 60

## 2021-05-04 RX ORDER — METFORMIN HYDROCHLORIDE 500 MG/1
TABLET, EXTENDED RELEASE ORAL
Qty: 120 TAB | Refills: 3
Start: 2021-05-04 | End: 2021-05-24 | Stop reason: SDUPTHER

## 2021-05-04 NOTE — TELEPHONE ENCOUNTER
Patient is having problems adjusting to the:metFORMIN ER (GLUCOPHAGE XR) 500 mg tablet. It is making her stomach hurt. When dosage went up the pain started. Please call [phone].

## 2021-05-04 NOTE — TELEPHONE ENCOUNTER
Patient states when she increased her Metformin to 2 a day BID, her stomach hurts , she has vomited x 2 in the evening, burping a lot and some diarrhea.

## 2021-05-24 ENCOUNTER — OFFICE VISIT (OUTPATIENT)
Dept: FAMILY MEDICINE CLINIC | Age: 51
End: 2021-05-24
Payer: COMMERCIAL

## 2021-05-24 VITALS
OXYGEN SATURATION: 97 % | SYSTOLIC BLOOD PRESSURE: 116 MMHG | BODY MASS INDEX: 31.62 KG/M2 | TEMPERATURE: 98.5 F | WEIGHT: 185.2 LBS | DIASTOLIC BLOOD PRESSURE: 84 MMHG | HEIGHT: 64 IN | HEART RATE: 91 BPM | RESPIRATION RATE: 16 BRPM

## 2021-05-24 DIAGNOSIS — E66.9 NON MORBID OBESITY: ICD-10-CM

## 2021-05-24 DIAGNOSIS — I10 ESSENTIAL HYPERTENSION: ICD-10-CM

## 2021-05-24 DIAGNOSIS — E78.00 HYPERCHOLESTEROLEMIA: ICD-10-CM

## 2021-05-24 DIAGNOSIS — K50.90 CROHN'S DISEASE WITHOUT COMPLICATION, UNSPECIFIED GASTROINTESTINAL TRACT LOCATION (HCC): ICD-10-CM

## 2021-05-24 DIAGNOSIS — E11.9 TYPE 2 DIABETES MELLITUS WITHOUT COMPLICATION, WITHOUT LONG-TERM CURRENT USE OF INSULIN (HCC): Primary | ICD-10-CM

## 2021-05-24 DIAGNOSIS — Z51.81 ENCOUNTER FOR MEDICATION MONITORING: ICD-10-CM

## 2021-05-24 LAB
BILIRUB UR QL STRIP: NEGATIVE
GLUCOSE POC: 125 MG/DL
GLUCOSE UR-MCNC: NEGATIVE MG/DL
KETONES P FAST UR STRIP-MCNC: NEGATIVE MG/DL
PH UR STRIP: 5.5 [PH] (ref 4.6–8)
PROT UR QL STRIP: NEGATIVE
SP GR UR STRIP: 1.02 (ref 1–1.03)
UA UROBILINOGEN AMB POC: NORMAL (ref 0.2–1)
URINALYSIS CLARITY POC: CLEAR
URINALYSIS COLOR POC: YELLOW
URINE BLOOD POC: NEGATIVE
URINE LEUKOCYTES POC: NEGATIVE
URINE NITRITES POC: NEGATIVE

## 2021-05-24 PROCEDURE — 82947 ASSAY GLUCOSE BLOOD QUANT: CPT | Performed by: FAMILY MEDICINE

## 2021-05-24 PROCEDURE — 81003 URINALYSIS AUTO W/O SCOPE: CPT | Performed by: FAMILY MEDICINE

## 2021-05-24 PROCEDURE — 99213 OFFICE O/P EST LOW 20 MIN: CPT | Performed by: FAMILY MEDICINE

## 2021-05-24 RX ORDER — METFORMIN HYDROCHLORIDE 500 MG/1
500 TABLET, EXTENDED RELEASE ORAL 2 TIMES DAILY WITH MEALS
COMMUNITY
End: 2022-05-25 | Stop reason: SDUPTHER

## 2021-05-24 NOTE — PROGRESS NOTES
Chief Complaint   Patient presents with    Diabetes     6 week follow up    Medication Evaluation     6 week follow up               1. Have you been to the ER, urgent care clinic since your last visit? Hospitalized since your last visit? No    2. Have you seen or consulted any other health care providers outside of the [address] [place] [address] since your last visit? Include any pap smears or colon screening.  no

## 2021-05-24 NOTE — PROGRESS NOTES
HISTORY OF PRESENT ILLNESS  Papito Stephen is a 45 y.o. female. HPI   Follow up on blood sugar and starting on Metformin. She had increased stomach cramping and diarrhea with takeing 1000 mg twice a day so she dropped back to 500 mg twice a day which she is tolerating better. Met with Con Randall which was helpful with her diet. She is not checking her BS. DM type II follow up:  Compliant w/ meds, diabetic diet, and exercise. Denies any tingling sensation, polyuria and polydipsia. No blurred vision. No significant weight changes. Feeling well since last OV. Cardiovascular Review:  The patient has hypertension. Diet and Lifestyle: generally follows a low fat low cholesterol diet, generally follows a low sodium diet, exercises sporadically  Home BP Monitoring: is not measured at home. Pertinent ROS: taking medications as instructed, no medication side effects noted, no TIA's, no chest pain on exertion, no dyspnea on exertion, no swelling of ankles, no palpitations. Patient Active Problem List   Diagnosis Code    Hypovitaminosis D E55.9    Herpes genitalia A60.00    Anemia D64.9    IGT (impaired glucose tolerance) R73.02    [facility] N29.88    Encounter for medication monitoring Z51.81    Non morbid obesity E66.9       Current Outpatient Medications   Medication Sig Dispense Refill    metFORMIN ER (GLUCOPHAGE XR) 500 mg tablet Take 500 mg by mouth two (2) times daily (with meals).  pantoprazole (Protonix) 40 mg tablet Take 40 mg by mouth two (2) times daily as needed.  ustekinumab (ustekinaumab) 90 mg/mL injection Inject 90 mg subcutaneous every 42 days      amLODIPine (NORVASC) 10 mg tablet Take 1 Tab by mouth daily. 90 Tab 3    chlorthalidone (HYGROTEN) 25 mg tablet Take 1 Tab by mouth daily. 90 Tab 3    potassium chloride (KLOR-CON) 10 mEq tablet Take 1 Tab by mouth daily.  90 Tab 3    cholecalciferol, vitamin D3, (VITAMIN D3) 2,000 unit tab Take 1 Tab by mouth daily.  cyanocobalamin 1,000 mcg tablet Take 1,000 mcg by mouth daily.  ferrous sulfate 325 mg (65 mg iron) tablet Take 325 mg by mouth every other day.       PREVIDENT 5000 ENAMEL PROTECT 1.1-5 % pste 2 times daily as needed  3       Allergies   Allergen Reactions    Ibuprofen Other (comments)     Can't take due to Crohns    Sulfa (Sulfonamide Antibiotics) Nausea Only       Past Medical History:   Diagnosis Date    Crohn disease ([address]     5/2017    Environmental allergies     GERD (gastroesophageal reflux disease)     Herpes genitalia        Past Surgical History:   Procedure Laterality Date    HX GYN  1991    Laser- HPV       Family History   Problem Relation Age of Onset    Heart Disease Mother     Bleeding Prob Mother         was on coumadin    Stroke Father     Diabetes Father     Cancer Sister         lymphoma    Diabetes Brother     Schizophrenia Brother     Diabetes Sister     Diabetes Sister     Mental Retardation Sister     No Known Problems Sister     Other Brother         paralysis from car accident    Heart Disease Brother     Pacemaker Brother     Kidney Disease Brother     Crohn's Disease Brother        Social History     Tobacco Use    Smoking status: Never Smoker    Smokeless tobacco: Never Used   Substance Use Topics    Alcohol use: Yes     Comment: edi        Lab Results   Component Value Date/Time    WBC 8.7 12/18/2020 09:19 AM    HGB 12.7 12/18/2020 09:19 AM    HCT 39.5 12/18/2020 09:19 AM    PLATELET 062 36/72/2798 09:19 AM    MCV 75 (L) 12/18/2020 09:19 AM     Lab Results   Component Value Date/Time    Cholesterol, total 215 (H) 04/05/2021 12:00 AM    HDL Cholesterol 39 (L) 04/05/2021 12:00 AM    LDL, calculated 132 (H) 04/05/2021 12:00 AM    LDL, calculated 120 (H) 10/23/2019 03:05 PM    Triglyceride 246 (H) 04/05/2021 12:00 AM     Lab Results   Component Value Date/Time    TSH 1.250 09/02/2015 10:21 AM      Lab Results   Component Value Date/Time    Sodium 139 04/05/2021 12:00 AM    Potassium 3.8 04/05/2021 12:00 AM    Chloride 99 04/05/2021 12:00 AM    CO2 26 04/05/2021 12:00 AM    Glucose 158 (H) 04/05/2021 12:00 AM    BUN 10 04/05/2021 12:00 AM    Creatinine 1.00 04/05/2021 12:00 AM    BUN/Creatinine ratio 10 04/05/2021 12:00 AM    GFR est AA 75 04/05/2021 12:00 AM    GFR est non-AA 65 04/05/2021 12:00 AM    Calcium 10.0 04/05/2021 12:00 AM    Bilirubin, total 0.4 12/18/2020 09:19 AM    ALT (SGPT) 10 12/18/2020 09:19 AM    Alk. phosphatase 103 12/18/2020 09:19 AM    Protein, total 7.2 12/18/2020 09:19 AM    Albumin 4.2 12/18/2020 09:19 AM    A-G Ratio 1.4 12/18/2020 09:19 AM      Lab Results   Component Value Date/Time    Hemoglobin A1c 6.6 (H) 04/05/2021 12:00 AM    Hemoglobin A1c (POC) 5.9 10/23/2019 03:08 PM         Review of Systems   Constitutional: Negative for malaise/fatigue. HENT: Negative for congestion. Eyes: Negative for blurred vision. Respiratory: Negative for cough and shortness of breath. Cardiovascular: Negative for chest pain, palpitations and leg swelling. Gastrointestinal: Negative for abdominal pain, constipation and heartburn. Genitourinary: Negative for dysuria, frequency and urgency. Musculoskeletal: Negative for back pain and joint pain. Neurological: Negative for dizziness, tingling and headaches. Endo/Heme/Allergies: Negative for environmental allergies. Psychiatric/Behavioral: Negative for depression. The patient does not have insomnia. Physical Exam  Vitals and nursing note reviewed. Constitutional:       Appearance: Normal appearance. She is well-developed. Comments: /84 (BP 1 Location: Left arm, BP Patient Position: Sitting)   Pulse 91   Temp 98.5 °F (36.9 °C) (Oral)   Resp 16   Ht 5' 4\" (1.626 m)   Wt 185 lb 3.2 oz (84 kg)   LMP 05/02/2021   SpO2 97%   BMI 31.79 kg/m²      Neck:      Thyroid: No thyromegaly.    Cardiovascular:      Rate and Rhythm: Normal rate and regular rhythm. Heart sounds: Normal heart sounds. No gallop. Pulmonary:      Effort: Pulmonary effort is normal.      Breath sounds: Normal breath sounds. Musculoskeletal:      Right lower leg: No edema. Left lower leg: No edema. Neurological:      Mental Status: She is alert. ASSESSMENT and PLAN  Diagnoses and all orders for this visit:    1. Type 2 diabetes mellitus without complication, without long-term current use of insulin (HCC)  Random .    -     AMB POC GLUCOSE, QUANTITATIVE, BLOOD  -     MICROALBUMIN, UR, RAND W/ MICROALB/CREAT RATIO; Future  -     AMB POC URINALYSIS DIP STICK AUTO W/O MICRO    2. Essential hypertension  Stable at goal    3. Hypercholesterolemia  Continue to monitor. Work on diet and exercise. 4. Crohn's disease without complication, unspecified gastrointestinal tract location [facility])  As per GI    5. Encounter for medication monitoring  -     METABOLIC PANEL, BASIC; Future    6. Non morbid obesity  I have reviewed/discussed the above normal BMI with the patient. I have recommended the following interventions: dietary management education, guidance, and counseling . Follow-up and Dispositions    · Return in about 3 months (around 8/24/2021).        reviewed diet, exercise and weight control  cardiovascular risk and specific lipid/LDL goals reviewed  reviewed medications and side effects in detail  specific diabetic recommendations: low cholesterol diet, weight control and daily exercise discussed and glycohemoglobin and other lab monitoring discussed

## 2021-05-25 LAB
ANION GAP SERPL CALC-SCNC: 9 MMOL/L (ref 5–15)
BUN SERPL-MCNC: 9 MG/DL (ref 6–20)
BUN/CREAT SERPL: 11 (ref 12–20)
CALCIUM SERPL-MCNC: 9.8 MG/DL (ref 8.5–10.1)
CHLORIDE SERPL-SCNC: 98 MMOL/L (ref 97–108)
CO2 SERPL-SCNC: 29 MMOL/L (ref 21–32)
CREAT SERPL-MCNC: 0.82 MG/DL (ref 0.55–1.02)
CREAT UR-MCNC: 104 MG/DL
GLUCOSE SERPL-MCNC: 125 MG/DL (ref 65–100)
MICROALBUMIN UR-MCNC: 0.99 MG/DL
MICROALBUMIN/CREAT UR-RTO: 10 MG/G (ref 0–30)
POTASSIUM SERPL-SCNC: 3.4 MMOL/L (ref 3.5–5.1)
SODIUM SERPL-SCNC: 136 MMOL/L (ref 136–145)

## 2021-05-25 RX ORDER — POTASSIUM CHLORIDE 750 MG/1
20 TABLET, EXTENDED RELEASE ORAL DAILY
Qty: 180 TABLET | Refills: 3 | Status: SHIPPED | OUTPATIENT
Start: 2021-05-25 | End: 2021-11-18 | Stop reason: SDUPTHER

## 2021-06-22 DIAGNOSIS — I10 ESSENTIAL HYPERTENSION: ICD-10-CM

## 2021-06-22 RX ORDER — CHLORTHALIDONE 25 MG/1
TABLET ORAL
Qty: 90 TABLET | Refills: 2 | Status: SHIPPED
Start: 2021-06-22 | End: 2022-03-15 | Stop reason: DRUGHIGH

## 2021-06-22 RX ORDER — PANTOPRAZOLE SODIUM 40 MG/1
TABLET, DELAYED RELEASE ORAL
Qty: 90 TABLET | Refills: 2 | Status: SHIPPED | OUTPATIENT
Start: 2021-06-22

## 2021-06-22 RX ORDER — AMLODIPINE BESYLATE 10 MG/1
TABLET ORAL
Qty: 90 TABLET | Refills: 2 | Status: SHIPPED | OUTPATIENT
Start: 2021-06-22 | End: 2022-03-15 | Stop reason: SDUPTHER

## 2021-08-04 ENCOUNTER — OFFICE VISIT (OUTPATIENT)
Dept: FAMILY MEDICINE CLINIC | Age: 51
End: 2021-08-04
Payer: COMMERCIAL

## 2021-08-04 VITALS
HEART RATE: 87 BPM | TEMPERATURE: 98.2 F | OXYGEN SATURATION: 98 % | WEIGHT: 185.8 LBS | HEIGHT: 64 IN | SYSTOLIC BLOOD PRESSURE: 112 MMHG | DIASTOLIC BLOOD PRESSURE: 80 MMHG | BODY MASS INDEX: 31.72 KG/M2 | RESPIRATION RATE: 16 BRPM

## 2021-08-04 DIAGNOSIS — I10 ESSENTIAL HYPERTENSION: Primary | ICD-10-CM

## 2021-08-04 DIAGNOSIS — E78.00 HYPERCHOLESTEROLEMIA: ICD-10-CM

## 2021-08-04 DIAGNOSIS — E66.9 NON MORBID OBESITY: ICD-10-CM

## 2021-08-04 DIAGNOSIS — E11.9 TYPE 2 DIABETES MELLITUS WITHOUT COMPLICATION, WITHOUT LONG-TERM CURRENT USE OF INSULIN (HCC): ICD-10-CM

## 2021-08-04 DIAGNOSIS — K50.90 CROHN'S DISEASE WITHOUT COMPLICATION, UNSPECIFIED GASTROINTESTINAL TRACT LOCATION (HCC): ICD-10-CM

## 2021-08-04 DIAGNOSIS — Z51.81 ENCOUNTER FOR MEDICATION MONITORING: ICD-10-CM

## 2021-08-04 LAB
ALBUMIN SERPL-MCNC: 3.7 G/DL (ref 3.5–5)
ALBUMIN/GLOB SERPL: 1 {RATIO} (ref 1.1–2.2)
ALP SERPL-CCNC: 94 U/L (ref 45–117)
ALT SERPL-CCNC: 31 U/L (ref 12–78)
ANION GAP SERPL CALC-SCNC: 7 MMOL/L (ref 5–15)
AST SERPL-CCNC: 18 U/L (ref 15–37)
BILIRUB SERPL-MCNC: 0.3 MG/DL (ref 0.2–1)
BUN SERPL-MCNC: 10 MG/DL (ref 6–20)
BUN/CREAT SERPL: 13 (ref 12–20)
CALCIUM SERPL-MCNC: 9.7 MG/DL (ref 8.5–10.1)
CHLORIDE SERPL-SCNC: 100 MMOL/L (ref 97–108)
CHOLEST SERPL-MCNC: 213 MG/DL
CO2 SERPL-SCNC: 28 MMOL/L (ref 21–32)
CREAT SERPL-MCNC: 0.78 MG/DL (ref 0.55–1.02)
ERYTHROCYTE [DISTWIDTH] IN BLOOD BY AUTOMATED COUNT: 15.7 % (ref 11.5–14.5)
GLOBULIN SER CALC-MCNC: 3.7 G/DL (ref 2–4)
GLUCOSE POC: 92 MG/DL
GLUCOSE SERPL-MCNC: 91 MG/DL (ref 65–100)
HBA1C MFR BLD HPLC: 6 %
HCT VFR BLD AUTO: 38.9 % (ref 35–47)
HDLC SERPL-MCNC: 55 MG/DL
HDLC SERPL: 3.9 {RATIO} (ref 0–5)
HGB BLD-MCNC: 12 G/DL (ref 11.5–16)
LDLC SERPL CALC-MCNC: 127.6 MG/DL (ref 0–100)
MCH RBC QN AUTO: 23.7 PG (ref 26–34)
MCHC RBC AUTO-ENTMCNC: 30.8 G/DL (ref 30–36.5)
MCV RBC AUTO: 76.9 FL (ref 80–99)
NRBC # BLD: 0 K/UL (ref 0–0.01)
NRBC BLD-RTO: 0 PER 100 WBC
PLATELET # BLD AUTO: 415 K/UL (ref 150–400)
PMV BLD AUTO: 9.8 FL (ref 8.9–12.9)
POTASSIUM SERPL-SCNC: 3.7 MMOL/L (ref 3.5–5.1)
PROT SERPL-MCNC: 7.4 G/DL (ref 6.4–8.2)
RBC # BLD AUTO: 5.06 M/UL (ref 3.8–5.2)
SODIUM SERPL-SCNC: 135 MMOL/L (ref 136–145)
TRIGL SERPL-MCNC: 152 MG/DL (ref ?–150)
VLDLC SERPL CALC-MCNC: 30.4 MG/DL
WBC # BLD AUTO: 9.3 K/UL (ref 3.6–11)

## 2021-08-04 PROCEDURE — 83036 HEMOGLOBIN GLYCOSYLATED A1C: CPT | Performed by: FAMILY MEDICINE

## 2021-08-04 PROCEDURE — 82947 ASSAY GLUCOSE BLOOD QUANT: CPT | Performed by: FAMILY MEDICINE

## 2021-08-04 PROCEDURE — 99213 OFFICE O/P EST LOW 20 MIN: CPT | Performed by: FAMILY MEDICINE

## 2021-08-04 NOTE — PROGRESS NOTES
Chief Complaint   Patient presents with    Diabetes     follow up    Hypertension     follow up             Mammogram 11/5/2020    Colonoscopy 5/16/2017 Dr. Ravindra Lawrence repeat in 10 years. Patient states she had eye exam 9/2020 by Dr. Henriquez . Patient will call to schedule appt. 1. Have you been to the ER, urgent care clinic since your last visit? Hospitalized since your last visit? No    2. Have you seen or consulted any other health care providers outside of the [address] [place] [address] since your last visit? Include any pap smears or colon screening.

## 2021-08-04 NOTE — PROGRESS NOTES
HISTORY OF PRESENT ILLNESS  Michael Christian is a 45 y.o. female. Follow up on blood sugar and starting on Metformin. She had increased stomach cramping and diarrhea with takeing 1000 mg twice a day so she dropped back to 500 mg twice a day which she is tolerating better. Met with Edward Armas which was helpful with her diet. She is not checking her BS. DM type II follow up:  Compliant w/ meds, diabetic diet, and exercise. Denies any tingling sensation, polyuria and polydipsia. No blurred vision. No significant weight changes. Feeling well since last OV. Cardiovascular Review:  The patient has hypertension. Diet and Lifestyle: generally follows a low fat low cholesterol diet, generally follows a low sodium diet, exercises sporadically  Home BP Monitoring: is not measured at home. Pertinent ROS: taking medications as instructed, no medication side effects noted, no TIA's, no chest pain on exertion, no dyspnea on exertion, no swelling of ankles, no palpitations. Patient Active Problem List   Diagnosis Code    Hypovitaminosis D E55.9    Herpes genitalia A60.00    Anemia D64.9    IGT (impaired glucose tolerance) R73.02    Crohn's disease without complication ([facility] N74.92    Encounter for medication monitoring Z51.81    Non morbid obesity E66.9       Current Outpatient Medications   Medication Sig Dispense Refill    amLODIPine (NORVASC) 10 mg tablet TAKE ONE TABLET BY MOUTH DAILY 90 Tablet 2    chlorthalidone (HYGROTON) 25 mg tablet TAKE ONE TABLET BY MOUTH DAILY 90 Tablet 2    pantoprazole (PROTONIX) 40 mg tablet TAKE ONE TABLET BY MOUTH DAILY AS NEEDED FOR ACID REFLUX 90 Tablet 2    potassium chloride (KLOR-CON) 10 mEq tablet Take 2 Tablets by mouth daily. 180 Tablet 3    metFORMIN ER (GLUCOPHAGE XR) 500 mg tablet Take 500 mg by mouth two (2) times daily (with meals).       ustekinumab (ustekinaumab) 90 mg/mL injection Inject 90 mg subcutaneous every 42 days      cholecalciferol, vitamin D3, (VITAMIN D3) 2,000 unit tab Take 1 Tab by mouth daily.  cyanocobalamin 1,000 mcg tablet Take 1,000 mcg by mouth daily.  ferrous sulfate 325 mg (65 mg iron) tablet Take 325 mg by mouth every other day.       PREVIDENT 5000 ENAMEL PROTECT 1.1-5 % pste 2 times daily as needed  3       Allergies   Allergen Reactions    Ibuprofen Other (comments)     Can't take due to Crohns    Sulfa (Sulfonamide Antibiotics) Nausea Only         Past Medical History:   Diagnosis Date    Crohn disease ([address]     5/2017    Environmental allergies     GERD (gastroesophageal reflux disease)     Herpes genitalia          Past Surgical History:   Procedure Laterality Date    HX GYN  1991    Laser- HPV         Family History   Problem Relation Age of Onset    Heart Disease Mother     Bleeding Prob Mother         was on coumadin    Stroke Father     Diabetes Father     Cancer Sister         lymphoma    Diabetes Brother     Schizophrenia Brother     Diabetes Sister     Diabetes Sister     Mental Retardation Sister     No Known Problems Sister     Other Brother         paralysis from car accident    Heart Disease Brother     Pacemaker Brother     Kidney Disease Brother     Crohn's Disease Brother        Social History     Tobacco Use    Smoking status: Never Smoker    Smokeless tobacco: Never Used   Substance Use Topics    Alcohol use: Yes     Comment: edi        Lab Results   Component Value Date/Time    WBC 8.7 12/18/2020 09:19 AM    HGB 12.7 12/18/2020 09:19 AM    HCT 39.5 12/18/2020 09:19 AM    PLATELET 238 32/31/2119 09:19 AM    MCV 75 (L) 12/18/2020 09:19 AM     Lab Results   Component Value Date/Time    Cholesterol, total 215 (H) 04/05/2021 12:00 AM    HDL Cholesterol 39 (L) 04/05/2021 12:00 AM    LDL, calculated 132 (H) 04/05/2021 12:00 AM    LDL, calculated 120 (H) 10/23/2019 03:05 PM    Triglyceride 246 (H) 04/05/2021 12:00 AM     Lab Results   Component Value Date/Time    TSH 1.250 09/02/2015 10:21 report received from ELKIN Dumont, pt resting comfortably w/ mom at bedside, currently breast feeding and tolerating PO. awaiting urine sample. will continue to monitor AM      Lab Results   Component Value Date/Time    Sodium 136 05/24/2021 04:15 PM    Potassium 3.4 (L) 05/24/2021 04:15 PM    Chloride 98 05/24/2021 04:15 PM    CO2 29 05/24/2021 04:15 PM    Anion gap 9 05/24/2021 04:15 PM    Glucose 125 (H) 05/24/2021 04:15 PM    BUN 9 05/24/2021 04:15 PM    Creatinine 0.82 05/24/2021 04:15 PM    BUN/Creatinine ratio 11 (L) 05/24/2021 04:15 PM    GFR est AA >60 05/24/2021 04:15 PM    GFR est non-AA >60 05/24/2021 04:15 PM    Calcium 9.8 05/24/2021 04:15 PM    Bilirubin, total 0.4 12/18/2020 09:19 AM    ALT (SGPT) 10 12/18/2020 09:19 AM    Alk. phosphatase 103 12/18/2020 09:19 AM    Protein, total 7.2 12/18/2020 09:19 AM    Albumin 4.2 12/18/2020 09:19 AM    A-G Ratio 1.4 12/18/2020 09:19 AM      Lab Results   Component Value Date/Time    Hemoglobin A1c 6.6 (H) 04/05/2021 12:00 AM    Hemoglobin A1c (POC) 5.9 10/23/2019 03:08 PM         Review of Systems   Constitutional: Negative for malaise/fatigue. HENT: Negative for congestion. Eyes: Negative for blurred vision. Respiratory: Negative for cough and shortness of breath. Cardiovascular: Negative for chest pain, palpitations and leg swelling. Gastrointestinal: Negative for abdominal pain, constipation and heartburn. Genitourinary: Negative for dysuria, frequency and urgency. Musculoskeletal: Negative for back pain and joint pain. Neurological: Negative for dizziness, tingling and headaches. Endo/Heme/Allergies: Negative for environmental allergies. Psychiatric/Behavioral: Negative for depression. The patient does not have insomnia. Physical Exam  Vitals and nursing note reviewed. Constitutional:       Appearance: Normal appearance. She is well-developed.       Comments: /80 (BP 1 Location: Left arm, BP Patient Position: Sitting)   Pulse 87   Temp 98.2 °F (36.8 °C) (Oral)   Resp 16   Ht 5' 4\" (1.626 m)   Wt 185 lb 12.8 oz (84.3 kg)   LMP 07/15/2021   SpO2 98%   BMI 31.89 kg/m²        HENT: Right Ear: Tympanic membrane and ear canal normal.      Left Ear: Tympanic membrane and ear canal normal.      Nose: No mucosal edema. Neck:      Thyroid: No thyromegaly. Cardiovascular:      Rate and Rhythm: Normal rate and regular rhythm. Heart sounds: Normal heart sounds. No gallop. Pulmonary:      Effort: Pulmonary effort is normal.      Breath sounds: Normal breath sounds. Abdominal:      General: Bowel sounds are normal.      Palpations: Abdomen is soft. There is no mass. Tenderness: There is no abdominal tenderness. Musculoskeletal:         General: Normal range of motion. Cervical back: Normal range of motion and neck supple. Right lower leg: No edema. Left lower leg: No edema. Lymphadenopathy:      Cervical: No cervical adenopathy. Skin:     General: Skin is warm and dry. Neurological:      General: No focal deficit present. Mental Status: She is alert and oriented to person, place, and time. Psychiatric:         Mood and Affect: Mood normal.         ASSESSMENT and PLAN  Diagnoses and all orders for this visit:    1. Essential hypertension  Stable. We discussed change her to ace or arb at her next visit. Discussed sodium restriction, high k rich diet, maintaining ideal body weight and regular exercise program such as daily walking 30 min perday 4-5 times per week, as physiologic means to achieve blood pressure control. Medication compliance advised. 2. Type 2 diabetes mellitus without complication, without long-term current use of insulin ([address]  Continue to monitor. Work on diet and exercise. -     AMB POC HEMOGLOBIN A1C  -     AMB POC GLUCOSE, QUANTITATIVE, BLOOD    3. Hypercholesterolemia  Continue to monitor. Work on diet and exercise.  -     LIPID PANEL; Future    4. Crohn's disease without complication, unspecified gastrointestinal tract location ([address]  Stable     5.  Encounter for medication monitoring  -     METABOLIC PANEL, COMPREHENSIVE; Future  -     CBC W/O DIFF; Future    6. Non morbid obesity  I have reviewed/discussed the above normal BMI with the patient. I have recommended the following interventions: dietary management education, guidance, and counseling . Follow-up and Dispositions    · Return in about 5 months (around 1/4/2022). reviewed diet, exercise and weight control  cardiovascular risk and specific lipid/LDL goals reviewed  reviewed medications and side effects in detail  specific diabetic recommendations: low cholesterol diet, weight control and daily exercise discussed and glycohemoglobin and other lab monitoring discussed      I have discussed diagnosis listed in this note with pt and/or family. I have discussed treatment plans and options and the risk/benefit analysis of those options, including safe use of medications and possible medication side effects. Through the use of shared decision making we have agreed to the above plan. The patient has received an after-visit summary and questions were answered concerning future plans and follow up. Advise pt of any urgent changes then to proceed to the ER.

## 2021-08-20 ENCOUNTER — TRANSCRIBE ORDER (OUTPATIENT)
Dept: SCHEDULING | Age: 51
End: 2021-08-20

## 2021-08-20 DIAGNOSIS — Z12.31 VISIT FOR SCREENING MAMMOGRAM: Primary | ICD-10-CM

## 2021-10-06 ENCOUNTER — CLINICAL SUPPORT (OUTPATIENT)
Dept: FAMILY MEDICINE CLINIC | Age: 51
End: 2021-10-06
Payer: COMMERCIAL

## 2021-10-06 DIAGNOSIS — Z23 NEEDS FLU SHOT: ICD-10-CM

## 2021-10-06 DIAGNOSIS — Z23 ENCOUNTER FOR IMMUNIZATION: ICD-10-CM

## 2021-10-06 PROCEDURE — 90471 IMMUNIZATION ADMIN: CPT | Performed by: FAMILY MEDICINE

## 2021-10-06 PROCEDURE — 90686 IIV4 VACC NO PRSV 0.5 ML IM: CPT | Performed by: FAMILY MEDICINE

## 2021-10-06 NOTE — PATIENT INSTRUCTIONS
Vaccine Information Statement    Influenza (Flu) Vaccine (Live, Intranasal): What You Need to Know    Many vaccine information statements are available in Mong[place]ian and other languages. See www.immunize.org/vis. Hojas de información sobre vacunas están disponibles en español y en muchos otros idiomas. Visite www.immunize.org/vis. 1. Why get vaccinated? Influenza vaccine can prevent influenza (flu). Flu is a contagious disease that spreads around the [facility] every year, usually between October and May. Anyone can get the flu, but it is more dangerous for some people. Infants and young children, people 72years of age and older, pregnant people, and people with certain health conditions or a weakened immune system are at greatest risk of flu complications. Pneumonia, bronchitis, sinus infections, and ear infections are examples of flu-related complications. If you have a medical condition, such as heart disease, cancer, or diabetes, flu can make it worse. Flu can cause fever and chills, sore throat, muscle aches, fatigue, cough, headache, and runny or stuffy nose. Some people may have vomiting and diarrhea, though this is more common in children than adults. In an average year, thousands of people in the [facility] die from flu, and many more are hospitalized. Flu vaccine prevents millions of illnesses and flu-related visits to the doctor each year. 2. Live, attenuated influenza vaccine     CDC recommends everyone 6 months and older get vaccinated every flu season. Children 6 months through 6years of age may need 2 doses during a single flu season. Everyone else needs only 1 dose each flu season. Live, attenuated influenza vaccine (called LAIV) is a nasal spray vaccine that may be given to non-pregnant people 2 through 52years of age. It takes about 2 weeks for protection to develop after vaccination. There are many flu viruses, and they are always changing.  Each year a new flu vaccine is made to protect against the influenza viruses believed to be likely to cause disease in the upcoming flu season. Even when the vaccine doesnt exactly match these viruses, it may still provide some protection. Influenza vaccine does not cause flu. Influenza vaccine may be given at the same time as other vaccines. 3. Talk with your health care provider    Tell your vaccination provider if the person getting the vaccine:   Is younger than 2 years or older than 52years of age  Laura Leslie Is pregnant. Live, attenuated influenza vaccine is not recommended for pregnant people   Has had an allergic reaction after a previous dose of influenza vaccine, or has any severe, life-threatening allergies   Is a child or adolescent 2 through 16years of age who is receiving aspirin or aspirin- or salicylate-containing products   Has a weakened immune system   Is a child 3through 3years old who has asthma or a history of wheezing in the past 12 months   Is 5 years or older and has asthma   Has taken influenza antiviral medication in the last 3 weeks   Cares for severely immunocompromised people who require a protected environment   Has other underlying medical conditions that can put people at higher risk of serious flu complications (such as lung disease, heart disease, kidney disease like diabetes, kidney or liver disorders, neurologic or neuromuscular or metabolic disorders)   Does not have a spleen, or has a non-functioning spleen   Has a cochlear implant   Has a cerebrospinal fluid leak (a leak of the fluid that surrounds the brain to the nose, throat, ear, or some other location in the head)   Has had Guillain-Barré Syndrome within 6 weeks after a previous dose of influenza vaccine    In some cases, your health care provider may decide to postpone influenza vaccination until a future visit.       For some patients, a different type of influenza vaccine (inactivated or recombinant influenza vaccine) might be more appropriate than live, attenuated influenza vaccine. People with minor illnesses, such as a cold, may be vaccinated. People who are moderately or severely ill should usually wait until they recover before getting influenza vaccine. Your health care provider can give you more information. 4. Risks of a vaccine reaction     Runny nose or nasal congestion, wheezing, and headache can happen after LAIV vaccination.  Vomiting, muscle aches, fever, sore throat, and cough are other possible side effects. If these problems occur, they usually begin soon after vaccination and are mild and short-lived. As with any medicine, there is a very remote chance of a vaccine causing a severe allergic reaction, other serious injury, or death. 5. What if there is a serious problem? An allergic reaction could occur after the vaccinated person leaves the clinic. If you see signs of a severe allergic reaction (hives, swelling of the face and throat, difficulty breathing, a fast heartbeat, dizziness, or weakness), call 9-1-1 and get the person to the nearest hospital.    For other signs that concern you, call your health care provider. Adverse reactions should be reported to the Vaccine Adverse Event Reporting System (VAERS). Your health care provider will usually file this report, or you can do it yourself. Visit the VAERS website at www.vaers. hhs.gov or call 4-799.525.2093. VAERS is only for reporting reactions, and VAERS staff members do not give medical advice. 6. The National Vaccine Injury Compensation Program    The [facility] Vaccine Injury Compensation Program (VICP) is a federal program that was created to compensate people who may have been injured by certain vaccines. Claims regarding alleged injury or death due to vaccination have a time limit for filing, which may be as short as two years.  Visit the VICP website at www.hrsa.gov/vaccinecompensation or call 4-289.172.3329 to learn about the program and about filing a claim. 7. How can I learn more?  Ask your health care provider.  Call your local or state health department.  Visit the website of the Food and Drug Administration (FDA) for vaccine package inserts and additional information at www.fda.gov/vaccines-blood-biologics/vaccines.  Contact the Centers for Disease Control and Prevention (CDC):  - Call 8-303.764.6353 (1-800-CDC-INFO) or  - Visit CDCs influenza website at www.cdc.gov/flu. Vaccine Information Statement   Live, Attenuated Influenza Vaccine   8/6/2021  42 JORGE Vigil [id number]-[id number]   Department of Health and Human Services  Centers for Disease Control and Prevention    Office Use Only

## 2021-10-06 NOTE — PROGRESS NOTES
Chief Complaint   Patient presents with    Injection     Here for her yearly flu vaccine. Tolerated well with no adverse reactions. 1. Have you been to the ER, urgent care clinic since your last visit? Hospitalized since your last visit? No    2. Have you seen or consulted any other health care providers outside of the [address] [place] [address] since your last visit? Include any pap smears or colon screening.  No

## 2021-10-29 ENCOUNTER — TELEPHONE (OUTPATIENT)
Dept: FAMILY MEDICINE CLINIC | Age: 51
End: 2021-10-29

## 2021-10-29 NOTE — TELEPHONE ENCOUNTER
Pt stated she will do the COVID testing here on Monday @ 10 am and the virtual that evening. She was told to take flonase and claritin or allegra per Dr. Patrick Pearson. She also stated her eye was getting redder and crustier.   [phone]

## 2021-11-01 ENCOUNTER — VIRTUAL VISIT (OUTPATIENT)
Dept: FAMILY MEDICINE CLINIC | Age: 51
End: 2021-11-01
Payer: COMMERCIAL

## 2021-11-01 DIAGNOSIS — J06.9 UPPER RESPIRATORY TRACT INFECTION, UNSPECIFIED TYPE: Primary | ICD-10-CM

## 2021-11-01 DIAGNOSIS — H10.31 ACUTE BACTERIAL CONJUNCTIVITIS OF RIGHT EYE: ICD-10-CM

## 2021-11-01 PROCEDURE — 99212 OFFICE O/P EST SF 10 MIN: CPT | Performed by: FAMILY MEDICINE

## 2021-11-01 RX ORDER — TOBRAMYCIN 3 MG/ML
1 SOLUTION/ DROPS OPHTHALMIC 3 TIMES DAILY
Qty: 1 EACH | Refills: 0 | Status: SHIPPED | OUTPATIENT
Start: 2021-11-01 | End: 2021-11-08

## 2021-11-01 RX ORDER — AZITHROMYCIN 250 MG/1
TABLET, FILM COATED ORAL
Qty: 6 TABLET | Refills: 0 | Status: SHIPPED | OUTPATIENT
Start: 2021-11-01 | End: 2021-11-15 | Stop reason: ALTCHOICE

## 2021-11-01 NOTE — PROGRESS NOTES
Chief Complaint   Patient presents with    Cough    Red Eye     pt c/o right eye being slighly red                   1. Have you been to the ER, urgent care clinic since your last visit? Hospitalized since your last visit? No    2. Have you seen or consulted any other health care providers outside of the [address] [place] [address] since your last visit? Include any pap smears or colon screening.

## 2021-11-01 NOTE — PROGRESS NOTES
HISTORY OF PRESENT ILLNESS  Fidelia Leavitt is a 45 y.o. female. HPI   Patient encounter by synchronous (real-time) audio-video technology which is patient-initiated. Patient is aware that this encounter is a billable service with coverage as determined by her insurance carrier, all discussed at the time of check-in. Patient has given verbal consent to proceed. C/o chest and head congestion and cough for the past week. Coughing up phlegm. No fever or chills noted. Has malaise. Throat is sore but this that this is from the coughing. No chest pain or SOB. No wheezing noted. Has tried a number of OTC could remedies which has helped slightly. Still has increase coughing in the evenings. Also has right eye drainage and gunk crusty in the ye in the mornings. Has been using warm compresses to the eye. Pt is C19 vaccinated. Doing the precautionary measures at home to reduce risks of exposure COVID19. Also wearing mask when she is going out. No known sick contacts or known exposure to [address]. Patient Active Problem List   Diagnosis Code    Hypovitaminosis D E55.9    Herpes genitalia A60.00    Anemia D64.9    IGT (impaired glucose tolerance) R73.02    [facility]'s disease without complication ([facility] J78.07    Encounter for medication monitoring Z51.81    Non morbid obesity E66.9       Current Outpatient Medications   Medication Sig Dispense Refill    azithromycin (ZITHROMAX) 250 mg tablet Take 2 tablets today, then take 1 tablet daily 6 Tablet 0    tobramycin (TOBREX) 0.3 % ophthalmic solution Administer 1 Drop to right eye three (3) times daily for 7 days.  1 Each 0    amLODIPine (NORVASC) 10 mg tablet TAKE ONE TABLET BY MOUTH DAILY 90 Tablet 2    chlorthalidone (HYGROTON) 25 mg tablet TAKE ONE TABLET BY MOUTH DAILY 90 Tablet 2    pantoprazole (PROTONIX) 40 mg tablet TAKE ONE TABLET BY MOUTH DAILY AS NEEDED FOR ACID REFLUX 90 Tablet 2    potassium chloride (KLOR-CON) 10 mEq tablet Take 2 Tablets by mouth daily. 180 Tablet 3    metFORMIN ER (GLUCOPHAGE XR) 500 mg tablet Take 500 mg by mouth two (2) times daily (with meals).  ustekinumab (ustekinaumab) 90 mg/mL injection Inject 90 mg subcutaneous every 42 days      cholecalciferol, vitamin D3, (VITAMIN D3) 2,000 unit tab Take 1 Tab by mouth daily.  cyanocobalamin 1,000 mcg tablet Take 1,000 mcg by mouth daily.  ferrous sulfate 325 mg (65 mg iron) tablet Take 325 mg by mouth every other day.  PREVIDENT 5000 ENAMEL PROTECT 1.1-5 % pste 2 times daily as needed  3       Allergies   Allergen Reactions    Ibuprofen Other (comments)     Can't take due to Crohns    Sulfa (Sulfonamide Antibiotics) Nausea Only       Past Medical History:   Diagnosis Date    Cro[facility] disease ([facility]     5/2017    Environmental allergies     GERD (gastroesophageal reflux disease)     Herpes genitalia        Past Surgical History:   Procedure Laterality Date    HX GYN  1991    Laser- HPV       Family History   Problem Relation Age of Onset    Heart Disease Mother     Bleeding Prob Mother         was on coumadin    Stroke Father     Diabetes Father     Cancer Sister         lymphoma    Diabetes Brother     Schizophrenia Brother     Diabetes Sister     Diabetes Sister     Mental Retardation Sister     No Known Problems Sister     Other Brother         paralysis from car accident    Heart Disease Brother     Pacemaker Brother     Kidney Disease Brother     Crohn's Disease Brother        Social History     Tobacco Use    Smoking status: Never Smoker    Smokeless tobacco: Never Used   Substance Use Topics    Alcohol use: Yes     Comment: edi BEAVER    Physical Exam  Constitutional:       Appearance: Normal appearance. Pulmonary:      Effort: Pulmonary effort is normal.   Neurological:      Mental Status: She is alert. Psychiatric:         Mood and Affect: Mood normal.         Thought Content:  Thought content normal. ASSESSMENT and PLAN  Diagnoses and all orders for this visit:    1. Upper respiratory tract infection, unspecified type  -     NOVEL CORONAVIRUS (COVID-19); Future  -     azithromycin (ZITHROMAX) 250 mg tablet; Take 2 tablets today, then take 1 tablet daily  Continue with mucinex DM as needed for the cough. 2. Acute bacterial conjunctivitis of right eye  -     tobramycin (TOBREX) 0.3 % ophthalmic solution; Administer 1 Drop to right eye three (3) times daily for 7 days. reviewed medications and side effects in detail    Due to this being a TeleHealth encounter (During [address] emergency), evaluation of the following organ systems was limited: Vital/Constitutional/EENT/Resp/CV/GI//MS/Neuro/Skin/Heme-Lymph-Imm. Pursuant to the emergency declaration under the [address] and [facility] waiver authority and the [employer] Act, this Virtual Visit was conducted, with patient's consent, to reduce the patient's risk of exposure to COVID-19 and provide continuity of care for an established patient. Services were provided through a video synchronous discussion virtually to substitute for in-person appointment. This visit was completed using [email or web address]    Time in virtual visit: 11 minutes

## 2021-11-03 ENCOUNTER — TELEPHONE (OUTPATIENT)
Dept: FAMILY MEDICINE CLINIC | Age: 51
End: 2021-11-03

## 2021-11-03 LAB — SARS-COV-2, NAA: NORMAL

## 2021-11-15 ENCOUNTER — OFFICE VISIT (OUTPATIENT)
Dept: FAMILY MEDICINE CLINIC | Age: 51
End: 2021-11-15
Payer: COMMERCIAL

## 2021-11-15 VITALS
BODY MASS INDEX: 30.32 KG/M2 | WEIGHT: 177.6 LBS | HEART RATE: 77 BPM | TEMPERATURE: 97.7 F | RESPIRATION RATE: 16 BRPM | OXYGEN SATURATION: 98 % | HEIGHT: 64 IN | DIASTOLIC BLOOD PRESSURE: 87 MMHG | SYSTOLIC BLOOD PRESSURE: 121 MMHG

## 2021-11-15 DIAGNOSIS — E55.9 HYPOVITAMINOSIS D: ICD-10-CM

## 2021-11-15 DIAGNOSIS — I10 ESSENTIAL HYPERTENSION: Primary | ICD-10-CM

## 2021-11-15 DIAGNOSIS — E66.9 NON MORBID OBESITY: ICD-10-CM

## 2021-11-15 DIAGNOSIS — K50.90 CROHN'S DISEASE WITHOUT COMPLICATION, UNSPECIFIED GASTROINTESTINAL TRACT LOCATION (HCC): ICD-10-CM

## 2021-11-15 DIAGNOSIS — E53.8 LOW SERUM VITAMIN B12: ICD-10-CM

## 2021-11-15 DIAGNOSIS — E11.9 TYPE 2 DIABETES MELLITUS WITHOUT COMPLICATION, WITHOUT LONG-TERM CURRENT USE OF INSULIN (HCC): ICD-10-CM

## 2021-11-15 DIAGNOSIS — E78.00 HYPERCHOLESTEROLEMIA: ICD-10-CM

## 2021-11-15 DIAGNOSIS — Z23 ENCOUNTER FOR ADMINISTRATION OF COVID-19 VACCINE: ICD-10-CM

## 2021-11-15 DIAGNOSIS — D50.9 IRON DEFICIENCY ANEMIA, UNSPECIFIED IRON DEFICIENCY ANEMIA TYPE: ICD-10-CM

## 2021-11-15 DIAGNOSIS — Z51.81 ENCOUNTER FOR MEDICATION MONITORING: ICD-10-CM

## 2021-11-15 LAB
25(OH)D3 SERPL-MCNC: 30.2 NG/ML (ref 30–100)
ANION GAP SERPL CALC-SCNC: 7 MMOL/L (ref 5–15)
BUN SERPL-MCNC: 12 MG/DL (ref 6–20)
BUN/CREAT SERPL: 12 (ref 12–20)
CALCIUM SERPL-MCNC: 10.7 MG/DL (ref 8.5–10.1)
CHLORIDE SERPL-SCNC: 100 MMOL/L (ref 97–108)
CHOLEST SERPL-MCNC: 215 MG/DL
CO2 SERPL-SCNC: 30 MMOL/L (ref 21–32)
COMMENT, HOLDF: NORMAL
CREAT SERPL-MCNC: 0.97 MG/DL (ref 0.55–1.02)
FERRITIN SERPL-MCNC: 59 NG/ML (ref 26–388)
GLUCOSE POC: 114 MG/DL
GLUCOSE SERPL-MCNC: 99 MG/DL (ref 65–100)
HBA1C MFR BLD HPLC: 6.4 %
HDLC SERPL-MCNC: 50 MG/DL
HDLC SERPL: 4.3 {RATIO} (ref 0–5)
IRON SATN MFR SERPL: 10 % (ref 20–50)
IRON SERPL-MCNC: 44 UG/DL (ref 35–150)
LDLC SERPL CALC-MCNC: 125.2 MG/DL (ref 0–100)
POTASSIUM SERPL-SCNC: 3.4 MMOL/L (ref 3.5–5.1)
SAMPLES BEING HELD,HOLD: NORMAL
SODIUM SERPL-SCNC: 137 MMOL/L (ref 136–145)
TIBC SERPL-MCNC: 427 UG/DL (ref 250–450)
TRIGL SERPL-MCNC: 199 MG/DL (ref ?–150)
VIT B12 SERPL-MCNC: 385 PG/ML (ref 193–986)
VLDLC SERPL CALC-MCNC: 39.8 MG/DL

## 2021-11-15 PROCEDURE — 99214 OFFICE O/P EST MOD 30 MIN: CPT | Performed by: FAMILY MEDICINE

## 2021-11-15 PROCEDURE — 91300 COVID-19, MRNA, LNP-S, PF, 30MCG/0.3ML DOSE(PFIZER): CPT | Performed by: FAMILY MEDICINE

## 2021-11-15 PROCEDURE — 82947 ASSAY GLUCOSE BLOOD QUANT: CPT | Performed by: FAMILY MEDICINE

## 2021-11-15 PROCEDURE — 0004A COVID-19, MRNA, LNP-S, PF, 30MCG/0.3ML DOSE(PFIZER): CPT | Performed by: FAMILY MEDICINE

## 2021-11-15 PROCEDURE — 83036 HEMOGLOBIN GLYCOSYLATED A1C: CPT | Performed by: FAMILY MEDICINE

## 2021-11-15 NOTE — PROGRESS NOTES
HISTORY OF PRESENT ILLNESS  Zannie Cowden is a 45 y.o. female. HPI   Follow up on chronic medical problems. DM type II follow up:  Compliant w/ meds, diabetic diet, and exercise. Tolerating metformin. She does not check her BS at this time. Interested in seeing dietician/diabetic educator again to help with her diet. Denies any tingling sensation, polyuria and polydipsia. No blurred vision. No significant weight changes. Feeling well since last OV. Cardiovascular Review:  The patient has hypertension and hypercholesterolemia. Diet and Lifestyle: generally follows a low fat low cholesterol diet, generally follows a low sodium diet, exercises sporadically  Home BP Monitoring: is not measured at home. Pertinent ROS: taking medications as instructed, no medication side effects noted, no TIA's, no chest pain on exertion, no dyspnea on exertion, no swelling of ankles, no palpitations. Patient Active Problem List   Diagnosis Code    Hypovitaminosis D E55.9    Herpes genitalia A60.00    Anemia D64.9    IGT (impaired glucose tolerance) R73.02    [facility] W99.30    Encounter for medication monitoring Z51.81    Non morbid obesity E66.9       Current Outpatient Medications   Medication Sig Dispense Refill    amLODIPine (NORVASC) 10 mg tablet TAKE ONE TABLET BY MOUTH DAILY 90 Tablet 2    chlorthalidone (HYGROTON) 25 mg tablet TAKE ONE TABLET BY MOUTH DAILY 90 Tablet 2    pantoprazole (PROTONIX) 40 mg tablet TAKE ONE TABLET BY MOUTH DAILY AS NEEDED FOR ACID REFLUX 90 Tablet 2    potassium chloride (KLOR-CON) 10 mEq tablet Take 2 Tablets by mouth daily. 180 Tablet 3    metFORMIN ER (GLUCOPHAGE XR) 500 mg tablet Take 500 mg by mouth two (2) times daily (with meals).  ustekinumab (ustekinaumab) 90 mg/mL injection Inject 90 mg subcutaneous every 42 days      cholecalciferol, vitamin D3, (VITAMIN D3) 2,000 unit tab Take 1 Tab by mouth daily.       cyanocobalamin 1,000 mcg tablet Take 1,000 mcg by mouth daily.  ferrous sulfate 325 mg (65 mg iron) tablet Take 325 mg by mouth every other day.       PREVIDENT 5000 ENAMEL PROTECT 1.1-5 % pste 2 times daily as needed (Patient not taking: Reported on 11/15/2021)  3       Allergies   Allergen Reactions    Ibuprofen Other (comments)     Can't take due to Crohns    Sulfa (Sulfonamide Antibiotics) Nausea Only       Past Medical History:   Diagnosis Date    Crohn disease ([address]     5/2017    Environmental allergies     GERD (gastroesophageal reflux disease)     Herpes genitalia        Past Surgical History:   Procedure Laterality Date    HX GYN  1991    Laser- HPV       Family History   Problem Relation Age of Onset    Heart Disease Mother     Bleeding Prob Mother         was on coumadin    Stroke Father     Diabetes Father     Cancer Sister         lymphoma    Diabetes Brother     Schizophrenia Brother     Diabetes Sister     Diabetes Sister     Mental Retardation Sister     No Known Problems Sister     Other Brother         paralysis from car accident    Heart Disease Brother     Pacemaker Brother     Kidney Disease Brother     Crohn's Disease Brother        Social History     Tobacco Use    Smoking status: Never Smoker    Smokeless tobacco: Never Used   Substance Use Topics    Alcohol use: Yes     Comment: edi        Lab Results   Component Value Date/Time    WBC 9.3 08/04/2021 10:39 AM    HGB 12.0 08/04/2021 10:39 AM    HCT 38.9 08/04/2021 10:39 AM    PLATELET 526 (H) 73/60/4757 10:39 AM    MCV 76.9 (L) 08/04/2021 10:39 AM     Lab Results   Component Value Date/Time    Cholesterol, total 213 (H) 08/04/2021 10:39 AM    HDL Cholesterol 55 08/04/2021 10:39 AM    LDL, calculated 127.6 (H) 08/04/2021 10:39 AM    Triglyceride 152 (H) 08/04/2021 10:39 AM    CHOL/HDL Ratio 3.9 08/04/2021 10:39 AM     Lab Results   Component Value Date/Time    TSH 1.250 09/02/2015 10:21 AM      Lab Results   Component Value Date/Time    Sodium 135 (L) 08/04/2021 10:39 AM    Potassium 3.7 08/04/2021 10:39 AM    Chloride 100 08/04/2021 10:39 AM    CO2 28 08/04/2021 10:39 AM    Anion gap 7 08/04/2021 10:39 AM    Glucose 91 08/04/2021 10:39 AM    BUN 10 08/04/2021 10:39 AM    Creatinine 0.78 08/04/2021 10:39 AM    BUN/Creatinine ratio 13 08/04/2021 10:39 AM    GFR est AA >60 08/04/2021 10:39 AM    GFR est non-AA >60 08/04/2021 10:39 AM    Calcium 9.7 08/04/2021 10:39 AM    Bilirubin, total 0.3 08/04/2021 10:39 AM    ALT (SGPT) 31 08/04/2021 10:39 AM    Alk. phosphatase 94 08/04/2021 10:39 AM    Protein, total 7.4 08/04/2021 10:39 AM    Albumin 3.7 08/04/2021 10:39 AM    Globulin 3.7 08/04/2021 10:39 AM    A-G Ratio 1.0 (L) 08/04/2021 10:39 AM      Lab Results   Component Value Date/Time    Hemoglobin A1c 6.6 (H) 04/05/2021 12:00 AM    Hemoglobin A1c (POC) 6.4 11/15/2021 04:23 PM         Review of Systems   Constitutional: Negative for malaise/fatigue. HENT: Negative for congestion. Eyes: Negative for blurred vision. Respiratory: Negative for cough and shortness of breath. Cardiovascular: Negative for chest pain, palpitations and leg swelling. Gastrointestinal: Negative for abdominal pain, constipation and heartburn. Genitourinary: Negative for dysuria, frequency and urgency. Musculoskeletal: Negative for back pain and joint pain. Neurological: Negative for dizziness, tingling and headaches. Endo/Heme/Allergies: Negative for environmental allergies. Psychiatric/Behavioral: Negative for depression. The patient does not have insomnia. Physical Exam  Vitals and nursing note reviewed. Constitutional:       Appearance: Normal appearance. She is well-developed.       Comments: /87 (BP 1 Location: Right arm, BP Patient Position: Sitting, BP Cuff Size: Adult)   Pulse 77   Temp 97.7 °F (36.5 °C) (Oral)   Resp 16   Ht 5' 4\" (1.626 m)   Wt 177 lb 9.6 oz (80.6 kg)   LMP 10/04/2021   SpO2 98%   BMI 30.48 kg/m² HENT:      Right Ear: Tympanic membrane and ear canal normal.      Left Ear: Tympanic membrane and ear canal normal.      Nose: No mucosal edema. Neck:      Thyroid: No thyromegaly. Cardiovascular:      Rate and Rhythm: Normal rate and regular rhythm. Heart sounds: Normal heart sounds. Pulmonary:      Effort: Pulmonary effort is normal.      Breath sounds: Normal breath sounds. Abdominal:      General: Bowel sounds are normal.      Palpations: Abdomen is soft. There is no mass. Tenderness: There is no abdominal tenderness. Musculoskeletal:         General: Normal range of motion. Cervical back: Normal range of motion and neck supple. Right lower leg: No edema. Left lower leg: No edema. Comments: Foot exam done . Normal sensation to PP, LT and vibration. Sensation intact to microfilament testing. Pulses intact. No swelling. No skin lesions or sores noted. No tinea present. Lymphadenopathy:      Cervical: No cervical adenopathy. Skin:     General: Skin is warm and dry. Neurological:      General: No focal deficit present. Mental Status: She is alert and oriented to person, place, and time. Psychiatric:         Mood and Affect: Mood normal.         ASSESSMENT and PLAN  Diagnoses and all orders for this visit:    1. Essential hypertension  Discussed sodium restriction, high k rich diet, maintaining ideal body weight and regular exercise program such as daily walking 30 min perday 4-5 times per week, as physiologic means to achieve blood pressure control. Medication compliance advised. 2. Type 2 diabetes mellitus without complication, without long-term current use of insulin ([facility])  a1c at 6.4%. Continue to monitor. Work on diet and exercise. -     AMB POC HEMOGLOBIN A1C  -     AMB POC GLUCOSE, QUANTITATIVE, BLOOD    3. Hypercholesterolemia  Continue to monitor. Work on diet and exercise.  -     LIPID PANEL; Future    4.  Crohn's disease without complication, unspecified gastrointestinal tract location ([facility]  Stable seeing GI on next month. 5. Hypovitaminosis D  -     VITAMIN D, 25 HYDROXY; Future    6. Low serum vitamin B12  -     VITAMIN B12; Future    7. Iron deficiency anemia, unspecified iron deficiency anemia type  -     IRON PROFILE; Future  -     FERRITIN; Future    8. Encounter for medication monitoring  -     METABOLIC PANEL, BASIC; Future    9. Non morbid obesity  I have reviewed/discussed the above normal BMI with the patient. I have recommended the following interventions: dietary management education, guidance, and counseling . 10. Encounter for administration of COVID-19 vaccine  -     COVID-19, MRNA, LNP-S, PF, 30MCG/0.3ML DOSE(PFIZER)      Follow-up and Dispositions    · Return in about 6 months (around 5/15/2022). reviewed diet, exercise and weight control  cardiovascular risk and specific lipid/LDL goals reviewed  reviewed medications and side effects in detail  specific diabetic recommendations: low cholesterol diet, weight control and daily exercise discussed, all medications, side effects and compliance discussed carefully, foot care discussed and Podiatry visits discussed, annual eye examinations at Ophthalmology discussed and glycohemoglobin and other lab monitoring discussed     I have discussed diagnosis listed in this note with pt and/or family. I have discussed treatment plans and options and the risk/benefit analysis of those options, including safe use of medications and possible medication side effects. Through the use of shared decision making we have agreed to the above plan. The patient has received an after-visit summary and questions were answered concerning future plans and follow up. Advise pt of any urgent changes then to proceed to the ER.

## 2021-11-15 NOTE — PROGRESS NOTES
Verbal Order with Readback given by Beena Gramajo MD  for Pfizer COVID-19 Booster, 0.3 mL. Given in Left deltoid without difficulty. Pt monitored for 15 minutes with no reaction.

## 2021-11-15 NOTE — PROGRESS NOTES
Name and  Verified. Pharmacy verified      Chief Complaint   Patient presents with    Follow-up     3 Months/ Hypertension       1. Have you been to the ER, urgent care clinic since your last visit? Hospitalized since your last visit? No    2. Have you seen or consulted any other health care providers outside of the [address] [place] [address] since your last visit? Include any pap smears or colon screening.  No      Health Maintenance Due   Topic Date Due    Foot Exam Q1  Never done    Cervical cancer screen  Never done    Eye Exam Retinal or Dilated  2019

## 2021-11-18 RX ORDER — POTASSIUM CHLORIDE 750 MG/1
20 TABLET, EXTENDED RELEASE ORAL 2 TIMES DAILY
Qty: 360 TABLET | Refills: 3 | Status: SHIPPED | OUTPATIENT
Start: 2021-11-18 | End: 2021-12-02 | Stop reason: SDUPTHER

## 2021-12-02 RX ORDER — POTASSIUM CHLORIDE 750 MG/1
20 TABLET, EXTENDED RELEASE ORAL 2 TIMES DAILY
Qty: 360 TABLET | Refills: 3 | Status: SHIPPED | OUTPATIENT
Start: 2021-12-02

## 2021-12-05 NOTE — PROGRESS NOTES
Pt presenting for follow up diabetes visit as re referred by Dr. Verenice Mathew, with recent A1c up to 6.4% (from 6.0%). Saw pt initially in April 2021 when A1c had gone to 6.6% for new diabetes diagnosis education. Pt started on metformin then and looks like she's still on it. She was going to work on getting back into exercise and reducing juices after that visit. Lab letter to patient after PCP visit in Nov advised pt to cut chlorthalidone in half, start a potassium supplement, and need for consideration of cholesterol medication so will also follow up on these things. Reviewed labs with patient and educated on recommendations and cholesterol, consideration for medication as well as confirmed increase in potassium and cutting in half the chlorthalidone per lab letter to patient from PCP. Wanted to see a nutritionist / dietician but not covered she thinks, so referred back to me for more targeted education. Is doing better with low carb drinks. Answered a lot of questions regarding nutrition that patient had for the majority of the visit. Reviewed low saturated fat, low carbohydrate, and low salt options for the foods she likes / eats. Feels like she still has struggled with getting back into exercise.      [facility] referral for either of the following programs to help with getting back on track with exercise and continuing to work on nutrition:  Physical Activity   Patients who are trying to add physical activity into their life or continue physical rehabilitation   Dedicated time with fitness expert in our [address] [place] [address] Meet over 12 weekly sessions for total of 3 months   Ages 16+   Type 2 Diabetes Management   Patients are trying to control type 2 diabetes    Facilitated small group discussions on diabetes self-management, physical activity and general nutrition guidelines   Applied learning techniques: grocery store tour; group cooking session   Meet over 12 weekly sessions for total of 3 months   Patients must have a T2D diagnosis   Ages 18+     Follow up with PCP in March as planned and with me as desired. Patient verbalized understanding of information presented. Answered all of the patient's questions. Andre Ramsey, PHARMD, [address] [place] [address],[address]:  Yes   Recommendation Provided To: Patient/Caregiver: 2 via In person   Intervention Detail: Adherence Monitoring: 3   Intervention Accepted By: Patient/Caregiver: 3   Time Spent (min): 60

## 2021-12-06 ENCOUNTER — OFFICE VISIT (OUTPATIENT)
Dept: FAMILY MEDICINE CLINIC | Age: 51
End: 2021-12-06

## 2021-12-06 DIAGNOSIS — E11.9 TYPE 2 DIABETES MELLITUS WITHOUT COMPLICATION, WITHOUT LONG-TERM CURRENT USE OF INSULIN (HCC): Primary | ICD-10-CM

## 2021-12-08 ENCOUNTER — TELEPHONE (OUTPATIENT)
Dept: FAMILY MEDICINE CLINIC | Age: 51
End: 2021-12-08

## 2022-03-14 ENCOUNTER — HOSPITAL ENCOUNTER (OUTPATIENT)
Dept: MAMMOGRAPHY | Age: 52
Discharge: HOME | End: 2022-03-14
Attending: FAMILY MEDICINE
Payer: COMMERCIAL

## 2022-03-14 DIAGNOSIS — Z12.31 VISIT FOR SCREENING MAMMOGRAM: ICD-10-CM

## 2022-03-14 PROCEDURE — 77063 BREAST TOMOSYNTHESIS BI: CPT

## 2022-03-15 ENCOUNTER — OFFICE VISIT (OUTPATIENT)
Dept: FAMILY MEDICINE CLINIC | Age: 52
End: 2022-03-15
Payer: COMMERCIAL

## 2022-03-15 VITALS
WEIGHT: 176.8 LBS | BODY MASS INDEX: 30.19 KG/M2 | SYSTOLIC BLOOD PRESSURE: 122 MMHG | RESPIRATION RATE: 16 BRPM | TEMPERATURE: 98 F | HEART RATE: 79 BPM | DIASTOLIC BLOOD PRESSURE: 78 MMHG | OXYGEN SATURATION: 98 % | HEIGHT: 64 IN

## 2022-03-15 DIAGNOSIS — Z00.00 ROUTINE GENERAL MEDICAL EXAMINATION AT A HEALTH CARE FACILITY: Primary | ICD-10-CM

## 2022-03-15 DIAGNOSIS — I10 ESSENTIAL HYPERTENSION: ICD-10-CM

## 2022-03-15 DIAGNOSIS — E11.9 TYPE 2 DIABETES MELLITUS WITHOUT COMPLICATION, WITHOUT LONG-TERM CURRENT USE OF INSULIN (HCC): Primary | ICD-10-CM

## 2022-03-15 DIAGNOSIS — Z51.81 ENCOUNTER FOR MEDICATION MONITORING: ICD-10-CM

## 2022-03-15 DIAGNOSIS — R42 POSTURAL DIZZINESS: ICD-10-CM

## 2022-03-15 DIAGNOSIS — K50.90 CROHN'S DISEASE WITHOUT COMPLICATION, UNSPECIFIED GASTROINTESTINAL TRACT LOCATION (HCC): ICD-10-CM

## 2022-03-15 DIAGNOSIS — E78.00 HYPERCHOLESTEROLEMIA: ICD-10-CM

## 2022-03-15 DIAGNOSIS — E11.9 TYPE 2 DIABETES MELLITUS WITHOUT COMPLICATION, WITHOUT LONG-TERM CURRENT USE OF INSULIN (HCC): ICD-10-CM

## 2022-03-15 DIAGNOSIS — E66.9 NON MORBID OBESITY: ICD-10-CM

## 2022-03-15 LAB
GLUCOSE POC: 105 MG/DL
HBA1C MFR BLD HPLC: 6 %

## 2022-03-15 PROCEDURE — 93000 ELECTROCARDIOGRAM COMPLETE: CPT | Performed by: FAMILY MEDICINE

## 2022-03-15 PROCEDURE — 82947 ASSAY GLUCOSE BLOOD QUANT: CPT | Performed by: FAMILY MEDICINE

## 2022-03-15 PROCEDURE — 99396 PREV VISIT EST AGE 40-64: CPT | Performed by: FAMILY MEDICINE

## 2022-03-15 PROCEDURE — 83036 HEMOGLOBIN GLYCOSYLATED A1C: CPT | Performed by: FAMILY MEDICINE

## 2022-03-15 RX ORDER — BLOOD SUGAR DIAGNOSTIC
STRIP MISCELLANEOUS
Qty: 100 STRIP | Refills: 11 | Status: SHIPPED | OUTPATIENT
Start: 2022-03-15

## 2022-03-15 RX ORDER — OLMESARTAN MEDOXOMIL 5 MG/1
5 TABLET ORAL
Qty: 30 TABLET | Refills: 2 | Status: SHIPPED | OUTPATIENT
Start: 2022-03-15

## 2022-03-15 RX ORDER — AMLODIPINE BESYLATE 10 MG/1
5 TABLET ORAL DAILY
Qty: 90 TABLET | Refills: 2
Start: 2022-03-15 | End: 2022-11-04 | Stop reason: SDUPTHER

## 2022-03-15 RX ORDER — CHLORTHALIDONE 25 MG/1
12.5 TABLET ORAL DAILY
COMMUNITY
End: 2022-04-22 | Stop reason: DRUGHIGH

## 2022-03-15 RX ORDER — OLMESARTAN MEDOXOMIL 5 MG/1
5 TABLET ORAL DAILY
Qty: 30 TABLET | Refills: 2 | Status: SHIPPED | OUTPATIENT
Start: 2022-03-15 | End: 2022-03-15 | Stop reason: SDUPTHER

## 2022-03-15 RX ORDER — LANCETS
EACH MISCELLANEOUS
Qty: 100 EACH | Refills: 11 | Status: SHIPPED | OUTPATIENT
Start: 2022-03-15

## 2022-03-15 RX ORDER — INSULIN PUMP SYRINGE, 3 ML
EACH MISCELLANEOUS
Qty: 1 KIT | Refills: 0 | Status: SHIPPED | OUTPATIENT
Start: 2022-03-15

## 2022-03-15 NOTE — PROGRESS NOTES
Chief Complaint   Patient presents with    Complete Physical     no pap       Patient goes to [facility] for pap and pelvic. Mammogram 3/4/2022    Colonoscopy 5/16/2017 Dr. Jewels Burnette repeat in 10 years. Patient states she had eye exam 9/2020 by Dr. Talisha Reddy. Patient signed medical release. 1. Have you been to the ER, urgent care clinic since your last visit? Hospitalized since your last visit? No    2. Have you seen or consulted any other health care providers outside of the [address] [place] [address] since your last visit? Include any pap smears or colon screening.

## 2022-03-15 NOTE — PROGRESS NOTES
Subjective:   45 y.o. female for Well Woman Check. 01/19/2021. Had pap done by GYN in November 2020. She is feeling well. Has hx of Crohn's disease being followed by GI. Cardiovascular Review:  The patient has hypertension. Diet and Lifestyle: generally follows a low fat low cholesterol diet, generally follows a low sodium diet, exercises sporadically  Home BP Monitoring: is not measured at home. Pertinent ROS: taking medications as instructed, no medication side effects noted, no TIA's, no chest pain on exertion, no dyspnea on exertion, no swelling of ankles, no palpitations. No headaches. Has some occasional dizziness which seems to be triggered if moving positions too quickly. Seem worse at night when she goes to lay down when it does come on. Only last for a few seconds. DM type II follow up:  Compliant w/ meds, diabetic diet, and exercise. Obtains home glucose monitoring averaging 100-140. Checks BS BID on most days and prn. Pt does not have BS log at visit today. No Rf needed for today. Denies any tingling sensation, polyuria and polydipsia. No blurred vision. No significant weight changes. Feeling well since last OV. HM:  Mammogram 3/4/2022   Colonoscopy 5/16/2017 Dr. Leana Thompson repeat in 10 years. Patient Active Problem List   Diagnosis Code    Hypovitaminosis D E55.9    Herpes genitalia A60.00    Anemia D64.9    IGT (impaired glucose tolerance) R73.02    Crohn's disease without complication ([facility] W72.88    Encounter for medication monitoring Z51.81    Non morbid obesity E66.9       Current Outpatient Medications   Medication Sig Dispense Refill    potassium chloride (KLOR-CON M10) 10 mEq tablet Take 2 Tablets by mouth two (2) times a day.  360 Tablet 3    amLODIPine (NORVASC) 10 mg tablet TAKE ONE TABLET BY MOUTH DAILY 90 Tablet 2    chlorthalidone (HYGROTON) 25 mg tablet TAKE ONE TABLET BY MOUTH DAILY 90 Tablet 2    pantoprazole (PROTONIX) 40 mg tablet TAKE ONE TABLET BY MOUTH DAILY AS NEEDED FOR ACID REFLUX 90 Tablet 2    metFORMIN ER (GLUCOPHAGE XR) 500 mg tablet Take 500 mg by mouth two (2) times daily (with meals).  ustekinumab (ustekinaumab) 90 mg/mL injection Inject 90 mg subcutaneous every 42 days      cholecalciferol, vitamin D3, (VITAMIN D3) 2,000 unit tab Take 1 Tab by mouth daily.  cyanocobalamin 1,000 mcg tablet Take 1,000 mcg by mouth daily.  ferrous sulfate 325 mg (65 mg iron) tablet Take 325 mg by mouth every other day. Allergies   Allergen Reactions    Ibuprofen Other (comments)     Can't take due to Crohns    Sulfa (Sulfonamide Antibiotics) Nausea Only         Past Medical History:   Diagnosis Date    Crohn disease ([facility]     5/2017    Environmental allergies     GERD (gastroesophageal reflux disease)     Herpes genitalia          Past Surgical History:   Procedure Laterality Date    HX GYN  1991    Laser- HPV         Family History   Problem Relation Age of Onset    Heart Disease Mother     Bleeding Prob Mother         was on coumadin    Stroke Father     Diabetes Father     Cancer Sister         lymphoma    Diabetes Brother     Schizophrenia Brother     Diabetes Sister     Diabetes Sister     Mental Retardation Sister     No Known Problems Sister     Other Brother         paralysis from car accident    Heart Disease Brother     Pacemaker Brother     Kidney Disease Brother     Crohn's Disease Brother        Social History     Tobacco Use    Smoking status: Never Smoker    Smokeless tobacco: Never Used   Substance Use Topics    Alcohol use: Yes     Comment: edi          ROS:  Feeling well. No dyspnea or chest pain on exertion. No abdominal pain, change in bowel habits, black or bloody stools. No urinary tract symptoms. GYN ROS: no breast pain or new or enlarging lumps on self exam, no discharge or pelvic pain. Periods have been irregular. No neurological complaints.     Objective:     Visit Vitals  BP 122/78 (BP 1 Location: Left arm, BP Patient Position: Sitting)   Pulse 79   Temp 98 °F (36.7 °C) (Oral)   Resp 16   Ht 5' 4\" (1.626 m)   Wt 176 lb 12.8 oz (80.2 kg)   LMP 03/11/2022   SpO2 98%   BMI 30.35 kg/m²     The patient appears well, alert, oriented x 3, in no distress. ENT normal.  Neck supple. No adenopathy or thyromegaly. RADHA. Lungs are clear, good air entry, no wheezes, rhonchi or rales. S1 and S2 normal, no murmurs, regular rate and rhythm. Abdomen soft without tenderness, guarding, mass or organomegaly. Extremities show no edema, normal peripheral pulses. Neurological is normal, no focal findings. BREAST EXAM: breasts appear normal, no suspicious masses, no skin or nipple changes or axillary nodes      Assessment/Plan:   well woman  mammogram  pap smear  counseled on breast self exam, mammography screening, menopause, osteoporosis and adequate intake of calcium and vitamin D  additional lab tests per orders  Diagnoses and all orders for this visit:      ASSESSMENT and PLAN  Diagnoses and all orders for this visit:    1. Routine general medical examination at a health care facility    2. Essential hypertension  -     amLODIPine (NORVASC) 10 mg tablet; Take 0.5 Tablets by mouth daily.  -     AMB POC EKG ROUTINE W/ 12 LEADS, INTER & REP  -  NSR, WNL  -     Add olmesartan (BENICAR) 5 mg tablet; Take 1 Tablet by mouth nightly for renal protection. 3. Type 2 diabetes mellitus without complication, without long-term current use of insulin (HCC)  a1c stable at 6.0%. Continue to monitor. Work on diet and exercise. -     AMB POC HEMOGLOBIN A1C  -     AMB POC GLUCOSE, QUANTITATIVE, BLOOD    4. Hypercholesterolemia  Continue to monitor. Work on diet and exercise.  -     LIPID PANEL; Future    5. Cro[facility]'s disease without complication, unspecified gastrointestinal tract location ([facility]  Stable as per GI. 6. Encounter for medication monitoring  -     CBC W/O DIFF;  Future  -     METABOLIC PANEL, COMPREHENSIVE; Future  -     MAGNESIUM; Future    7. Non morbid obesity  I have reviewed/discussed the above normal BMI with the patient. I have recommended the following interventions: dietary management education, guidance, and counseling . 8. Postural dizziness  Discussed hydration, avoid moving suddenly. She thinks at times related to her allergies, discussed adding in the claritin and flonase as needed. Follow-up and Dispositions    · Return in about 1 month (around 4/15/2022). reviewed diet, exercise and weight control  cardiovascular risk and specific lipid/LDL goals reviewed  reviewed medications and side effects in detail  specific diabetic recommendations: low cholesterol diet, weight control and daily exercise discussed, all medications, side effects and compliance discussed carefully, foot care discussed and Podiatry visits discussed, annual eye examinations at Ophthalmology discussed and glycohemoglobin and other lab monitoring discussed     I have discussed diagnosis listed in this note with pt and/or family. I have discussed treatment plans and options and the risk/benefit analysis of those options, including safe use of medications and possible medication side effects. Through the use of shared decision making we have agreed to the above plan. The patient has received an after-visit summary and questions were answered concerning future plans and follow up. Advise pt of any urgent changes then to proceed to the ER.

## 2022-03-16 LAB
ALBUMIN SERPL-MCNC: 4.6 G/DL (ref 3.8–4.9)
ALBUMIN/GLOB SERPL: 1.8 {RATIO} (ref 1.2–2.2)
ALP SERPL-CCNC: 108 IU/L (ref 44–121)
ALT SERPL-CCNC: 10 IU/L (ref 0–32)
AST SERPL-CCNC: 15 IU/L (ref 0–40)
BILIRUB SERPL-MCNC: 0.3 MG/DL (ref 0–1.2)
BUN SERPL-MCNC: 14 MG/DL (ref 6–24)
BUN/CREAT SERPL: 16 (ref 9–23)
CALCIUM SERPL-MCNC: 9.5 MG/DL (ref 8.7–10.2)
CHLORIDE SERPL-SCNC: 97 MMOL/L (ref 96–106)
CHOLEST SERPL-MCNC: 215 MG/DL (ref 100–199)
CO2 SERPL-SCNC: 25 MMOL/L (ref 20–29)
CREAT SERPL-MCNC: 0.9 MG/DL (ref 0.57–1)
EGFR: 77 ML/MIN/1.73
ERYTHROCYTE [DISTWIDTH] IN BLOOD BY AUTOMATED COUNT: 15.6 % (ref 11.7–15.4)
GLOBULIN SER CALC-MCNC: 2.6 G/DL (ref 1.5–4.5)
GLUCOSE SERPL-MCNC: 105 MG/DL (ref 65–99)
HCT VFR BLD AUTO: 38.1 % (ref 34–46.6)
HDLC SERPL-MCNC: 51 MG/DL
HGB BLD-MCNC: 12.3 G/DL (ref 11.1–15.9)
IMP & REVIEW OF LAB RESULTS: NORMAL
INTERPRETATION: NORMAL
LDLC SERPL CALC-MCNC: 144 MG/DL (ref 0–99)
MAGNESIUM SERPL-MCNC: 2.1 MG/DL (ref 1.6–2.3)
MCH RBC QN AUTO: 24.1 PG (ref 26.6–33)
MCHC RBC AUTO-ENTMCNC: 32.3 G/DL (ref 31.5–35.7)
MCV RBC AUTO: 75 FL (ref 79–97)
PLATELET # BLD AUTO: 444 X10E3/UL (ref 150–450)
POTASSIUM SERPL-SCNC: 3.7 MMOL/L (ref 3.5–5.2)
PROT SERPL-MCNC: 7.2 G/DL (ref 6–8.5)
RBC # BLD AUTO: 5.1 X10E6/UL (ref 3.77–5.28)
SODIUM SERPL-SCNC: 140 MMOL/L (ref 134–144)
TRIGL SERPL-MCNC: 111 MG/DL (ref 0–149)
VLDLC SERPL CALC-MCNC: 20 MG/DL (ref 5–40)
WBC # BLD AUTO: 10.1 X10E3/UL (ref 3.4–10.8)

## 2022-03-18 PROBLEM — R73.02 IGT (IMPAIRED GLUCOSE TOLERANCE): Status: ACTIVE | Noted: 2017-07-31

## 2022-03-19 PROBLEM — Z51.81 ENCOUNTER FOR MEDICATION MONITORING: Status: ACTIVE | Noted: 2018-02-05

## 2022-03-19 PROBLEM — E66.9 NON MORBID OBESITY: Status: ACTIVE | Noted: 2018-02-05

## 2022-03-19 PROBLEM — K50.90 CROHN'S DISEASE WITHOUT COMPLICATION (HCC): Status: ACTIVE | Noted: 2017-07-31

## 2022-03-20 PROBLEM — D64.9 ANEMIA: Status: ACTIVE | Noted: 2017-07-31

## 2022-04-22 ENCOUNTER — OFFICE VISIT (OUTPATIENT)
Dept: FAMILY MEDICINE CLINIC | Age: 52
End: 2022-04-22
Payer: COMMERCIAL

## 2022-04-22 VITALS
TEMPERATURE: 98.5 F | HEART RATE: 88 BPM | SYSTOLIC BLOOD PRESSURE: 108 MMHG | HEIGHT: 64 IN | OXYGEN SATURATION: 97 % | RESPIRATION RATE: 16 BRPM | BODY MASS INDEX: 30.46 KG/M2 | DIASTOLIC BLOOD PRESSURE: 70 MMHG | WEIGHT: 178.4 LBS

## 2022-04-22 DIAGNOSIS — Z23 ENCOUNTER FOR ADMINISTRATION OF COVID-19 VACCINE: ICD-10-CM

## 2022-04-22 DIAGNOSIS — E11.9 TYPE 2 DIABETES MELLITUS WITHOUT COMPLICATION, WITHOUT LONG-TERM CURRENT USE OF INSULIN (HCC): ICD-10-CM

## 2022-04-22 DIAGNOSIS — I10 ESSENTIAL HYPERTENSION: Primary | ICD-10-CM

## 2022-04-22 DIAGNOSIS — Z51.81 ENCOUNTER FOR MEDICATION MONITORING: ICD-10-CM

## 2022-04-22 PROCEDURE — 0054A COVID-19, PFIZER GRAY TOP, DO NOT DILUTE, TRIS-SUCROSE, 12+ YRS, PF, 30MCG/0.3 ML DOSE: CPT | Performed by: FAMILY MEDICINE

## 2022-04-22 PROCEDURE — 91305 COVID-19, PFIZER GRAY TOP, DO NOT DILUTE, TRIS-SUCROSE, 12+ YRS, PF, 30MCG/0.3 ML DOSE: CPT | Performed by: FAMILY MEDICINE

## 2022-04-22 PROCEDURE — 3044F HG A1C LEVEL LT 7.0%: CPT | Performed by: FAMILY MEDICINE

## 2022-04-22 PROCEDURE — 99212 OFFICE O/P EST SF 10 MIN: CPT | Performed by: FAMILY MEDICINE

## 2022-04-22 RX ORDER — VEDOLIZUMAB 300 MG/5ML
INJECTION, POWDER, LYOPHILIZED, FOR SOLUTION INTRAVENOUS
COMMUNITY

## 2022-04-22 NOTE — PROGRESS NOTES
Chief Complaint   Patient presents with    Diabetes     follow up    Hypertension     follow up     Mammogram 03/14/22    Colonoscopy 05/16/17 10 yrs.     Pap Smear 11/23/21    A1C 03/15/22    Microalbumin 05/24/21    1. \"Have you been to the ER, urgent care clinic since your last visit? Hospitalized since your last visit? \" No    2. \"Have you seen or consulted any other health care providers outside of the [address] [place] [address] since your last visit? \" No     3. For patients aged 39-70: Has the patient had a colonoscopy / FIT/ Cologuard? Yes - no Care Gap present      If the patient is female:    4. For patients aged 41-77: Has the patient had a mammogram within the past 2 years? Yes - no Care Gap present      5. For patients aged 21-65: Has the patient had a pap smear?  Yes - no Care Gap present

## 2022-04-22 NOTE — PROGRESS NOTES
.Verbal Order with Readback given by Abhilash Stacy MD for Pfizer COVID-19 Booster, 0.3 mL. Given in right deltoid without difficulty. Pt monitored for 15 minutes with no reaction.

## 2022-04-22 NOTE — PROGRESS NOTES
HISTORY OF PRESENT ILLNESS  Emre Reynoso is a 45 y.o. female. HPI   Follow up on chronic medical problems. She is feeling well. Has hx of Crohn's disease being followed by GI. Cardiovascular Review:  The patient has hypertension. Started on olmesartan for renal protection with hx of diabetes. Diet and Lifestyle: generally follows a low fat low cholesterol diet, generally follows a low sodium diet, exercises sporadically  Home BP Monitoring: is not measured at home. Pertinent ROS: taking medications as instructed, no medication side effects noted, no TIA's, no chest pain on exertion, no dyspnea on exertion, no swelling of ankles, no palpitations. No headaches. Dizziness has been better. DM type II follow up:  Compliant w/ meds, diabetic diet, and exercise. Obtains home glucose monitoring averaging 100-140. Checks BS BID on most days and prn. Pt does not have BS log at visit today. No Rf needed for today. Denies any tingling sensation, polyuria and polydipsia. No blurred vision. No significant weight changes. Feeling well since last OV. HM:  Mammogram 03/14/22  Colonoscopy 05/16/17 10 yrs.   Pap Smear 11/23/21  A1C 03/15/22  Microalbumin 05/24/21      Patient Active Problem List   Diagnosis Code    Hypovitaminosis D E55.9    Herpes genitalia A60.00    Anemia D64.9    IGT (impaired glucose tolerance) R73.02    [facility]'s disease without complication ([facility] R27.91    Encounter for medication monitoring Z51.81    Non morbid obesity E66.9       Current Outpatient Medications   Medication Sig Dispense Refill    vedolizumab (Entyvio) 300 mg injection Infusion is giving every 6 weeks until further notice.  BUDESONIDE PO Take 1 tab 3 times daily with meals      amLODIPine (NORVASC) 10 mg tablet Take 0.5 Tablets by mouth daily. 90 Tablet 2    olmesartan (BENICAR) 5 mg tablet Take 1 Tablet by mouth nightly.  30 Tablet 2    lancets (OneTouch UltraSoft Lancets) misc Use to check BS daily DX E11.9 100 Each 11    glucose blood VI test strips (OneTouch Ultra Test) strip Use to check BS daily DX E11.9 100 Strip 11    Blood-Glucose Meter (OneTouch Ultra2 Meter) monitoring kit Use to check BS daily DX E11.9 1 Kit 0    potassium chloride (KLOR-CON M10) 10 mEq tablet Take 2 Tablets by mouth two (2) times a day. 360 Tablet 3    pantoprazole (PROTONIX) 40 mg tablet TAKE ONE TABLET BY MOUTH DAILY AS NEEDED FOR ACID REFLUX 90 Tablet 2    metFORMIN ER (GLUCOPHAGE XR) 500 mg tablet Take 500 mg by mouth two (2) times daily (with meals).  cholecalciferol, vitamin D3, (VITAMIN D3) 2,000 unit tab Take 1 Tab by mouth daily.  ferrous sulfate 325 mg (65 mg iron) tablet Take 325 mg by mouth every other day.          Allergies   Allergen Reactions    Ibuprofen Other (comments)     Can't take due to Crohns    Sulfa (Sulfonamide Antibiotics) Nausea Only       Past Medical History:   Diagnosis Date    Crohn disease ([facility]     5/2017    Environmental allergies     GERD (gastroesophageal reflux disease)     Herpes genitalia        Past Surgical History:   Procedure Laterality Date    HX GYN  1991    Laser- HPV       Family History   Problem Relation Age of Onset    Heart Disease Mother     Bleeding Prob Mother         was on coumadin    Stroke Father     Diabetes Father     Cancer Sister         lymphoma    Diabetes Brother     Schizophrenia Brother     Diabetes Sister     Diabetes Sister     Mental Retardation Sister     No Known Problems Sister     Other Brother         paralysis from car accident    Heart Disease Brother     Pacemaker Brother     Kidney Disease Brother     Crohn's Disease Brother        Social History     Tobacco Use    Smoking status: Never Smoker    Smokeless tobacco: Never Used   Substance Use Topics    Alcohol use: Yes     Comment: edi        Lab Results   Component Value Date/Time    WBC 10.1 03/15/2022 12:00 AM    HGB 12.3 03/15/2022 12:00 AM    HCT 38.1 03/15/2022 12:00 AM    PLATELET 035 59/72/6644 12:00 AM    MCV 75 (L) 03/15/2022 12:00 AM     Lab Results   Component Value Date/Time    Cholesterol, total 215 (H) 03/15/2022 12:00 AM    HDL Cholesterol 51 03/15/2022 12:00 AM    LDL, calculated 144 (H) 03/15/2022 12:00 AM    LDL, calculated 125.2 (H) 11/15/2021 04:59 PM    Triglyceride 111 03/15/2022 12:00 AM    CHOL/HDL Ratio 4.3 11/15/2021 04:59 PM     Lab Results   Component Value Date/Time    TSH 1.250 09/02/2015 10:21 AM      Lab Results   Component Value Date/Time    Sodium 140 03/15/2022 12:00 AM    Potassium 3.7 03/15/2022 12:00 AM    Chloride 97 03/15/2022 12:00 AM    CO2 25 03/15/2022 12:00 AM    Anion gap 7 11/15/2021 04:59 PM    Glucose 105 (H) 03/15/2022 12:00 AM    BUN 14 03/15/2022 12:00 AM    Creatinine 0.90 03/15/2022 12:00 AM    BUN/Creatinine ratio 16 03/15/2022 12:00 AM    GFR est AA >60 11/15/2021 04:59 PM    GFR est non-AA >60 11/15/2021 04:59 PM    Calcium 9.5 03/15/2022 12:00 AM    Bilirubin, total 0.3 03/15/2022 12:00 AM    ALT (SGPT) 10 03/15/2022 12:00 AM    Alk. phosphatase 108 03/15/2022 12:00 AM    Protein, total 7.2 03/15/2022 12:00 AM    Albumin 4.6 03/15/2022 12:00 AM    Globulin 3.7 08/04/2021 10:39 AM    A-G Ratio 1.8 03/15/2022 12:00 AM      Lab Results   Component Value Date/Time    Hemoglobin A1c 6.6 (H) 04/05/2021 12:00 AM    Hemoglobin A1c (POC) 6.0 03/15/2022 10:10 AM         Review of Systems   Constitutional: Negative for malaise/fatigue. HENT: Negative for congestion. Eyes: Negative for blurred vision. Respiratory: Negative for cough and shortness of breath. Cardiovascular: Negative for chest pain, palpitations and leg swelling. Gastrointestinal: Negative for abdominal pain, constipation and heartburn. Genitourinary: Negative for dysuria, frequency and urgency. Musculoskeletal: Negative for back pain and joint pain. Neurological: Negative for dizziness, tingling and headaches.    Endo/Heme/Allergies: Negative for environmental allergies. Psychiatric/Behavioral: Negative for depression. The patient does not have insomnia. Physical Exam  Vitals and nursing note reviewed. Constitutional:       Appearance: Normal appearance. She is well-developed. Comments: /70   Pulse 88   Temp 98.5 °F (36.9 °C) (Oral)   Resp 16   Ht 5' 4\" (1.626 m)   Wt 178 lb 6.4 oz (80.9 kg)   LMP 03/11/2022 (Approximate)   SpO2 97%   BMI 30.62 kg/m²    Neck:      Thyroid: No thyromegaly. Cardiovascular:      Rate and Rhythm: Normal rate and regular rhythm. Heart sounds: Normal heart sounds. No gallop. Pulmonary:      Effort: Pulmonary effort is normal.      Breath sounds: Normal breath sounds. Musculoskeletal:      Right lower leg: No edema. Left lower leg: No edema. Neurological:      Mental Status: She is alert. ASSESSMENT and PLAN  Diagnoses and all orders for this visit:    1. Essential hypertension  BPs at the infusion center ave been on the low side. Will stop chlorthalidone. Continue with olmesartan and norvasc. Discussed sodium restriction, high k rich diet, maintaining ideal body weight and regular exercise program such as daily walking 30 min perday 4-5 times per week, as physiologic means to achieve blood pressure control. Medication compliance advised. 2. Type 2 diabetes mellitus without complication, without long-term current use of insulin (HCC)  Stable     3. Encounter for medication monitoring  -     METABOLIC PANEL, BASIC; Future      Follow-up and Dispositions    · Return in about 4 months (around 9/1/2022). reviewed diet, exercise and weight control  cardiovascular risk and specific lipid/LDL goals reviewed  reviewed medications and side effects in detail    I have discussed diagnosis listed in this note with pt and/or family.  I have discussed treatment plans and options and the risk/benefit analysis of those options, including safe use of medications and possible medication side effects. Through the use of shared decision making we have agreed to the above plan. The patient has received an after-visit summary and questions were answered concerning future plans and follow up. Advise pt of any urgent changes then to proceed to the ER.

## 2022-04-23 LAB
BUN SERPL-MCNC: 12 MG/DL (ref 6–24)
BUN/CREAT SERPL: 12 (ref 9–23)
CALCIUM SERPL-MCNC: 10 MG/DL (ref 8.7–10.2)
CHLORIDE SERPL-SCNC: 101 MMOL/L (ref 96–106)
CO2 SERPL-SCNC: 25 MMOL/L (ref 20–29)
CREAT SERPL-MCNC: 0.97 MG/DL (ref 0.57–1)
EGFR: 70 ML/MIN/1.73
GLUCOSE SERPL-MCNC: 101 MG/DL (ref 65–99)
POTASSIUM SERPL-SCNC: 4.4 MMOL/L (ref 3.5–5.2)
SODIUM SERPL-SCNC: 142 MMOL/L (ref 134–144)

## 2022-05-02 RX ORDER — BLOOD-GLUCOSE CONTROL, NORMAL
EACH MISCELLANEOUS DAILY
Qty: 100 LANCET | Refills: 3 | Status: SHIPPED | OUTPATIENT
Start: 2022-05-02

## 2022-05-25 DIAGNOSIS — E11.9 TYPE 2 DIABETES MELLITUS WITHOUT COMPLICATION, WITHOUT LONG-TERM CURRENT USE OF INSULIN (HCC): ICD-10-CM

## 2022-05-25 RX ORDER — METFORMIN HYDROCHLORIDE 500 MG/1
500 TABLET, EXTENDED RELEASE ORAL 2 TIMES DAILY WITH MEALS
Qty: 60 TABLET | Refills: 11 | Status: SHIPPED | OUTPATIENT
Start: 2022-05-25

## 2022-09-02 ENCOUNTER — OFFICE VISIT (OUTPATIENT)
Dept: FAMILY MEDICINE CLINIC | Age: 52
End: 2022-09-02
Payer: COMMERCIAL

## 2022-09-02 VITALS
HEIGHT: 64 IN | HEART RATE: 93 BPM | WEIGHT: 178.2 LBS | OXYGEN SATURATION: 96 % | SYSTOLIC BLOOD PRESSURE: 108 MMHG | TEMPERATURE: 98.3 F | RESPIRATION RATE: 16 BRPM | DIASTOLIC BLOOD PRESSURE: 75 MMHG | BODY MASS INDEX: 30.42 KG/M2

## 2022-09-02 DIAGNOSIS — Z51.81 ENCOUNTER FOR MEDICATION MONITORING: ICD-10-CM

## 2022-09-02 DIAGNOSIS — K50.90 CROHN'S DISEASE WITHOUT COMPLICATION, UNSPECIFIED GASTROINTESTINAL TRACT LOCATION (HCC): ICD-10-CM

## 2022-09-02 DIAGNOSIS — D50.8 OTHER IRON DEFICIENCY ANEMIA: ICD-10-CM

## 2022-09-02 DIAGNOSIS — E11.9 TYPE 2 DIABETES MELLITUS WITHOUT COMPLICATION, WITHOUT LONG-TERM CURRENT USE OF INSULIN (HCC): ICD-10-CM

## 2022-09-02 DIAGNOSIS — E66.9 NON MORBID OBESITY: ICD-10-CM

## 2022-09-02 DIAGNOSIS — E55.9 HYPOVITAMINOSIS D: ICD-10-CM

## 2022-09-02 DIAGNOSIS — E53.8 LOW SERUM VITAMIN B12: ICD-10-CM

## 2022-09-02 DIAGNOSIS — E78.00 HYPERCHOLESTEROLEMIA: ICD-10-CM

## 2022-09-02 DIAGNOSIS — I10 ESSENTIAL HYPERTENSION: Primary | ICD-10-CM

## 2022-09-02 LAB
GLUCOSE POC: 120 MG/DL
HBA1C MFR BLD HPLC: 6.2 %

## 2022-09-02 PROCEDURE — 3044F HG A1C LEVEL LT 7.0%: CPT | Performed by: FAMILY MEDICINE

## 2022-09-02 PROCEDURE — 83036 HEMOGLOBIN GLYCOSYLATED A1C: CPT | Performed by: FAMILY MEDICINE

## 2022-09-02 PROCEDURE — 82947 ASSAY GLUCOSE BLOOD QUANT: CPT | Performed by: FAMILY MEDICINE

## 2022-09-02 PROCEDURE — 99214 OFFICE O/P EST MOD 30 MIN: CPT | Performed by: FAMILY MEDICINE

## 2022-09-02 NOTE — PROGRESS NOTES
HISTORY OF PRESENT ILLNESS  Cece Herrera is a 47 y.o. female. HPI   Follow up on chronic medical problems. She is feeling well. Has hx of Crohn's disease being followed by GI. Cardiovascular Review:  The patient has hypertension. Started on olmesartan for renal protection with hx of diabetes. Diet and Lifestyle: generally follows a low fat low cholesterol diet, generally follows a low sodium diet, exercises sporadically  Home BP Monitoring: is not measured at home. Pertinent ROS: taking medications as instructed, no medication side effects noted, no TIA's, no chest pain on exertion, no dyspnea on exertion, no swelling of ankles, no palpitations. No headaches. Dizziness has been better. DM type II follow up:  Compliant w/ meds, diabetic diet, and exercise. Obtains home glucose monitoring averaging 100-140. Checks BS BID on most days and prn. Pt does not have BS log at visit today. No Rf needed for today. Denies any tingling sensation, polyuria and polydipsia. No blurred vision. No significant weight changes. Feeling well since last OV. HM:  Mammogram 03/14/22  Colonoscopy 05/16/17 10 yrs.   Pap Smear 11/23/21  A1C 03/15/22  Patient Active Problem List   Diagnosis Code    Hypovitaminosis D E55.9    Herpes genitalia A60.00    Anemia D64.9    IGT (impaired glucose tolerance) R73.02    Crohn's disease without complication ([facility] F20.91    Encounter for medication monitoring Z51.81    Non morbid obesity E66.9       Current Outpatient Medications   Medication Sig Dispense Refill    metFORMIN ER (GLUCOPHAGE XR) 500 mg tablet Take 1 Tablet by mouth two (2) times daily (with meals). 60 Tablet 11    lancets (One Touch Delica) 33 gauge misc Use to check BS daily. DX E11.9 300 Lancet 3    lancets (OneTouch Delica Lancets) 30 gauge misc by Does Not Apply route daily. Dx. Code E11.9 100 Lancet 3    vedolizumab (Entyvio) 300 mg injection Infusion is giving every 6 weeks until further notice. BUDESONIDE PO Take 1 tab 3 times daily with meals      amLODIPine (NORVASC) 10 mg tablet Take 0.5 Tablets by mouth daily. 90 Tablet 2    olmesartan (BENICAR) 5 mg tablet Take 1 Tablet by mouth nightly. 30 Tablet 2    lancets (OneTouch UltraSoft Lancets) misc Use to check BS daily DX E11.9 100 Each 11    glucose blood VI test strips (OneTouch Ultra Test) strip Use to check BS daily DX E11.9 100 Strip 11    Blood-Glucose Meter (OneTouch Ultra2 Meter) monitoring kit Use to check BS daily DX E11.9 1 Kit 0    potassium chloride (KLOR-CON M10) 10 mEq tablet Take 2 Tablets by mouth two (2) times a day. 360 Tablet 3    pantoprazole (PROTONIX) 40 mg tablet TAKE ONE TABLET BY MOUTH DAILY AS NEEDED FOR ACID REFLUX 90 Tablet 2    cholecalciferol, vitamin D3, (VITAMIN D3) 2,000 unit tab Take 1 Tab by mouth daily. ferrous sulfate 325 mg (65 mg iron) tablet Take 325 mg by mouth every other day. Allergies   Allergen Reactions    Ibuprofen Other (comments)     Can't take due to Crohns    Sulfa (Sulfonamide Antibiotics) Nausea Only           Past Medical History:   Diagnosis Date    Crohn disease ([facility]     5/2017    Environmental allergies     GERD (gastroesophageal reflux disease)     Herpes genitalia            Past Surgical History:   Procedure Laterality Date    HX GYN  1991    Laser- HPV           Family History   Problem Relation Age of Onset    Heart Disease Mother     Bleeding Prob Mother         was on coumadin    Stroke Father     Diabetes Father     Cancer Sister         lymphoma    Diabetes Brother     Schizophrenia Brother     Diabetes Sister     Diabetes Sister     Mental Retardation Sister     No Known Problems Sister     Other Brother         paralysis from car accident    Heart Disease Brother     Pacemaker Brother     Kidney Disease Brother     Crohn's Disease Brother        Social History     Tobacco Use    Smoking status: Never    Smokeless tobacco: Never   Substance Use Topics    Alcohol use:  Yes Comment: edi        Lab Results   Component Value Date/Time    WBC 10.1 03/15/2022 12:00 AM    HGB 12.3 03/15/2022 12:00 AM    HCT 38.1 03/15/2022 12:00 AM    PLATELET 627 21/52/8373 12:00 AM    MCV 75 (L) 03/15/2022 12:00 AM     Lab Results   Component Value Date/Time    Cholesterol, total 215 (H) 03/15/2022 12:00 AM    HDL Cholesterol 51 03/15/2022 12:00 AM    LDL, calculated 144 (H) 03/15/2022 12:00 AM    LDL, calculated 125.2 (H) 11/15/2021 04:59 PM    Triglyceride 111 03/15/2022 12:00 AM    CHOL/HDL Ratio 4.3 11/15/2021 04:59 PM     Lab Results   Component Value Date/Time    TSH 1.250 09/02/2015 10:21 AM      Lab Results   Component Value Date/Time    Sodium 142 04/22/2022 12:00 AM    Potassium 4.4 04/22/2022 12:00 AM    Chloride 101 04/22/2022 12:00 AM    CO2 25 04/22/2022 12:00 AM    Anion gap 7 11/15/2021 04:59 PM    Glucose 101 (H) 04/22/2022 12:00 AM    BUN 12 04/22/2022 12:00 AM    Creatinine 0.97 04/22/2022 12:00 AM    BUN/Creatinine ratio 12 04/22/2022 12:00 AM    GFR est AA >60 11/15/2021 04:59 PM    GFR est non-AA >60 11/15/2021 04:59 PM    Calcium 10.0 04/22/2022 12:00 AM    Bilirubin, total 0.3 03/15/2022 12:00 AM    ALT (SGPT) 10 03/15/2022 12:00 AM    Alk. phosphatase 108 03/15/2022 12:00 AM    Protein, total 7.2 03/15/2022 12:00 AM    Albumin 4.6 03/15/2022 12:00 AM    Globulin 3.7 08/04/2021 10:39 AM    A-G Ratio 1.8 03/15/2022 12:00 AM      Lab Results   Component Value Date/Time    Hemoglobin A1c 6.6 (H) 04/05/2021 12:00 AM    Hemoglobin A1c (POC) 6.0 03/15/2022 10:10 AM         Review of Systems   Constitutional:  Negative for malaise/fatigue. HENT:  Negative for congestion. Eyes:  Negative for blurred vision. Respiratory:  Negative for cough and shortness of breath. Cardiovascular:  Negative for chest pain, palpitations and leg swelling. Gastrointestinal:  Negative for abdominal pain, constipation and heartburn. Genitourinary:  Negative for dysuria, frequency and urgency. Musculoskeletal:  Negative for back pain and joint pain. Neurological:  Negative for dizziness, tingling and headaches. Endo/Heme/Allergies:  Negative for environmental allergies. Psychiatric/Behavioral:  Negative for depression. The patient does not have insomnia. Physical Exam  Vitals and nursing note reviewed. Constitutional:       Appearance: Normal appearance. She is well-developed. Comments: /75 (BP 1 Location: Left upper arm, BP Patient Position: Sitting, BP Cuff Size: Adult)   Pulse 93   Temp 98.3 °F (36.8 °C) (Oral)   Resp 16   Ht 5' 4\" (1.626 m)   Wt 178 lb 3.2 oz (80.8 kg)   SpO2 96%   BMI 30.59 kg/m²    HENT:      Right Ear: Tympanic membrane and ear canal normal.      Left Ear: Tympanic membrane and ear canal normal.   Neck:      Thyroid: No thyromegaly. Cardiovascular:      Rate and Rhythm: Normal rate and regular rhythm. Heart sounds: Normal heart sounds. No gallop. Pulmonary:      Effort: Pulmonary effort is normal.      Breath sounds: Normal breath sounds. Abdominal:      General: Bowel sounds are normal.      Palpations: Abdomen is soft. There is no mass. Tenderness: There is no abdominal tenderness. Musculoskeletal:         General: Normal range of motion. Cervical back: Normal range of motion and neck supple. Right lower leg: No edema. Left lower leg: No edema. Lymphadenopathy:      Cervical: No cervical adenopathy. Skin:     General: Skin is warm and dry. Neurological:      General: No focal deficit present. Mental Status: She is alert and oriented to person, place, and time. Psychiatric:         Mood and Affect: Mood normal.     ASSESSMENT and PLAN  Diagnoses and all orders for this visit:    1.  Essential hypertension  Discussed sodium restriction, high k rich diet, maintaining ideal body weight and regular exercise program such as daily walking 30 min perday 4-5 times per week, as physiologic means to achieve blood pressure control. Medication compliance advised. 2. Type 2 diabetes mellitus without complication, without long-term current use of insulin ([facility])  A1c stable at 6.2%. Continue to monitor. Work on diet and exercise. -     AMB POC HEMOGLOBIN A1C  -     AMB POC GLUCOSE, QUANTITATIVE, BLOOD  -     MICROALBUMIN, UR, RAND W/ MICROALB/CREAT RATIO; Future    3. Hypercholesterolemia  Continue to monitor. Work on diet and exercise.  -     LIPID PANEL; Future    4. Crohn's disease without complication, unspecified gastrointestinal tract location ([facility]  Stable. Follow up as per GI. Has appt this afternoon for follow up. 5. Other iron deficiency anemia  -     CBC W/O DIFF; Future  -     IRON PROFILE; Future  -     FERRITIN; Future    6. Hypovitaminosis D  -     VITAMIN D, 25 HYDROXY; Future    7. Low serum vitamin B12  -     VITAMIN B12; Future    8. Encounter for medication monitoring  -     METABOLIC PANEL, COMPREHENSIVE; Future  -     URINALYSIS W/MICROSCOPIC; Future    9. Non morbid obesity  I have reviewed/discussed the above normal BMI with the patient. I have recommended the following interventions: dietary management education, guidance, and counseling . Follow-up and Dispositions    Return in about 6 months (around 3/2/2023). reviewed diet, exercise and weight control  cardiovascular risk and specific lipid/LDL goals reviewed  reviewed medications and side effects in detail  specific diabetic recommendations: low cholesterol diet, weight control and daily exercise discussed, all medications, side effects and compliance discussed carefully, foot care discussed and Podiatry visits discussed, annual eye examinations at Ophthalmology discussed, and glycohemoglobin and other lab monitoring discussed      I have discussed diagnosis listed in this note with pt and/or family.  I have discussed treatment plans and options and the risk/benefit analysis of those options, including safe use of medications and possible medication side effects. Through the use of shared decision making we have agreed to the above plan. The patient has received an after-visit summary and questions were answered concerning future plans and follow up. Advise pt of any urgent changes then to proceed to the ER.

## 2022-09-03 LAB
25(OH)D3+25(OH)D2 SERPL-MCNC: 19.8 NG/ML (ref 30–100)
ERYTHROCYTE [DISTWIDTH] IN BLOOD BY AUTOMATED COUNT: 15.8 % (ref 11.7–15.4)
FERRITIN SERPL-MCNC: 37 NG/ML (ref 15–150)
HCT VFR BLD AUTO: 37.7 % (ref 34–46.6)
HGB BLD-MCNC: 11.9 G/DL (ref 11.1–15.9)
IRON SATN MFR SERPL: 9 % (ref 15–55)
IRON SERPL-MCNC: 38 UG/DL (ref 27–159)
MCH RBC QN AUTO: 23.3 PG (ref 26.6–33)
MCHC RBC AUTO-ENTMCNC: 31.6 G/DL (ref 31.5–35.7)
MCV RBC AUTO: 74 FL (ref 79–97)
PLATELET # BLD AUTO: 400 X10E3/UL (ref 150–450)
RBC # BLD AUTO: 5.1 X10E6/UL (ref 3.77–5.28)
TIBC SERPL-MCNC: 403 UG/DL (ref 250–450)
UIBC SERPL-MCNC: 365 UG/DL (ref 131–425)
VIT B12 SERPL-MCNC: 379 PG/ML (ref 232–1245)
WBC # BLD AUTO: 8.8 X10E3/UL (ref 3.4–10.8)

## 2022-11-04 DIAGNOSIS — I10 ESSENTIAL HYPERTENSION: ICD-10-CM

## 2022-11-04 RX ORDER — AMLODIPINE BESYLATE 10 MG/1
5 TABLET ORAL DAILY
Qty: 90 TABLET | Refills: 3 | Status: SHIPPED | OUTPATIENT
Start: 2022-11-04

## 2022-12-19 ENCOUNTER — TELEPHONE (OUTPATIENT)
Dept: FAMILY MEDICINE CLINIC | Age: 52
End: 2022-12-19

## 2022-12-19 NOTE — TELEPHONE ENCOUNTER
Patient would like a call from nurse regarding being check for covid and flu please give her a call @ [phone]

## 2022-12-20 ENCOUNTER — OFFICE VISIT (OUTPATIENT)
Dept: FAMILY MEDICINE CLINIC | Age: 52
End: 2022-12-20
Payer: COMMERCIAL

## 2022-12-20 DIAGNOSIS — J06.9 UPPER RESPIRATORY TRACT INFECTION, UNSPECIFIED TYPE: Primary | ICD-10-CM

## 2022-12-20 DIAGNOSIS — U07.1 COVID-19 VIRUS INFECTION: ICD-10-CM

## 2022-12-20 LAB
FLUAV+FLUBV AG NOSE QL IA.RAPID: NEGATIVE
FLUAV+FLUBV AG NOSE QL IA.RAPID: NEGATIVE
SARS-COV-2 PCR, POC: POSITIVE
VALID INTERNAL CONTROL?: YES

## 2022-12-20 PROCEDURE — 87635 SARS-COV-2 COVID-19 AMP PRB: CPT | Performed by: FAMILY MEDICINE

## 2022-12-20 PROCEDURE — 87804 INFLUENZA ASSAY W/OPTIC: CPT | Performed by: FAMILY MEDICINE

## 2022-12-20 NOTE — PROGRESS NOTES
Requesting test for COVID19. Has nasal congestion for the past 2 days and had exposure to person with COVID19 over this past weekend. COVID test is positive. Sx are mild will treat sx.

## 2023-01-27 ENCOUNTER — TELEPHONE (OUTPATIENT)
Dept: FAMILY MEDICINE CLINIC | Age: 53
End: 2023-01-27

## 2023-01-27 DIAGNOSIS — E11.9 TYPE 2 DIABETES MELLITUS WITHOUT COMPLICATION, WITHOUT LONG-TERM CURRENT USE OF INSULIN (HCC): ICD-10-CM

## 2023-01-27 RX ORDER — POTASSIUM CHLORIDE 750 MG/1
20 TABLET, EXTENDED RELEASE ORAL 2 TIMES DAILY
Qty: 360 TABLET | Refills: 3 | Status: SHIPPED | OUTPATIENT
Start: 2023-01-27

## 2023-01-27 RX ORDER — METFORMIN HYDROCHLORIDE 500 MG/1
500 TABLET, EXTENDED RELEASE ORAL 2 TIMES DAILY WITH MEALS
Qty: 60 TABLET | Refills: 11 | Status: SHIPPED | OUTPATIENT
Start: 2023-01-27

## 2023-01-27 NOTE — TELEPHONE ENCOUNTER
Pt needs 2 refills:      potassium chloride (KLOR-CON M10) 10 mEq tablet and     Metformin    [facility]       Best number to reach her is [phone]

## 2023-02-01 ENCOUNTER — OFFICE VISIT (OUTPATIENT)
Dept: FAMILY MEDICINE CLINIC | Age: 53
End: 2023-02-01

## 2023-02-01 DIAGNOSIS — Z23 ENCOUNTER FOR IMMUNIZATION: ICD-10-CM

## 2023-02-01 DIAGNOSIS — I10 ESSENTIAL HYPERTENSION: ICD-10-CM

## 2023-02-01 DIAGNOSIS — Z23 NEEDS FLU SHOT: Primary | ICD-10-CM

## 2023-02-01 RX ORDER — OLMESARTAN MEDOXOMIL 5 MG/1
5 TABLET ORAL
Qty: 30 TABLET | Refills: 11 | Status: SHIPPED | OUTPATIENT
Start: 2023-02-01

## 2023-02-01 RX ORDER — OLMESARTAN MEDOXOMIL 5 MG/1
5 TABLET ORAL
Qty: 30 TABLET | Refills: 2 | Status: CANCELLED | OUTPATIENT
Start: 2023-02-01

## 2023-02-01 NOTE — PROGRESS NOTES
Patient identified by 2 identifiers. Chief Complaint   Patient presents with    Immunization/Injection     1. Have you been to the ER, urgent care clinic since your last visit? Hospitalized since your last visit? No    2. Have you seen or consulted any other health care providers outside of the [address] [place] [address] since your last visit? Include any pap smears or colon screening. No    Verbal Order with Readback given by Myrna Hugo MD for Pfizer COVID-19 Booster, 0.3 mL. Given in left deltoid without difficulty. Pt monitored for 15 minutes with no reaction. Verbal Order with Readback given by Myrna Hugo MD for Influenza. Given in right Deltoid without difficulty.

## 2023-02-01 NOTE — PATIENT INSTRUCTIONS
Vaccine Information Statement    Influenza (Flu) Vaccine (Inactivated or Recombinant): What You Need to Know    Many vaccine information statements are available in [place] and other languages. See www.immunize.org/vis. Hojas de información sobre vacunas están disponibles en español y en muchos otros idiomas. Visite www.immunize.org/vis. 1. Why get vaccinated? Influenza vaccine can prevent influenza (flu). Flu is a contagious disease that spreads around the [facility] every year, usually between October and May. Anyone can get the flu, but it is more dangerous for some people. Infants and young children, people 72 years and older, pregnant people, and people with certain health conditions or a weakened immune system are at greatest risk of flu complications. Pneumonia, bronchitis, sinus infections, and ear infections are examples of flu-related complications. If you have a medical condition, such as heart disease, cancer, or diabetes, flu can make it worse. Flu can cause fever and chills, sore throat, muscle aches, fatigue, cough, headache, and runny or stuffy nose. Some people may have vomiting and diarrhea, though this is more common in children than adults. In an average year, thousands of people in the [facility] die from flu, and many more are hospitalized. Flu vaccine prevents millions of illnesses and flu-related visits to the doctor each year. 2. Influenza vaccines     CDC recommends everyone 6 months and older get vaccinated every flu season. Children 6 months through 6years of age may need 2 doses during a single flu season. Everyone else needs only 1 dose each flu season. It takes about 2 weeks for protection to develop after vaccination. There are many flu viruses, and they are always changing. Each year a new flu vaccine is made to protect against the influenza viruses believed to be likely to cause disease in the upcoming flu season.  Even when the vaccine doesnt exactly match these viruses, it may still provide some protection. Influenza vaccine does not cause flu. Influenza vaccine may be given at the same time as other vaccines. 3. Talk with your health care provider    Tell your vaccination provider if the person getting the vaccine:  Has had an allergic reaction after a previous dose of influenza vaccine, or has any severe, life-threatening allergies   Has ever had Guillain-Barré Syndrome (also called GBS)    In some cases, your health care provider may decide to postpone influenza vaccination until a future visit. Influenza vaccine can be administered at any time during pregnancy. People who are or will be pregnant during influenza season should receive inactivated influenza vaccine. People with minor illnesses, such as a cold, may be vaccinated. People who are moderately or severely ill should usually wait until they recover before getting influenza vaccine. Your health care provider can give you more information. 4. Risks of a vaccine reaction    Soreness, redness, and swelling where the shot is given, fever, muscle aches, and headache can happen after influenza vaccination. There may be a very small increased risk of Guillain-Barré Syndrome (GBS) after inactivated influenza vaccine (the flu shot). Damon Barger children who get the flu shot along with pneumococcal vaccine (PCV13) and/or DTaP vaccine at the same time might be slightly more likely to have a seizure caused by fever. Tell your health care provider if a child who is getting flu vaccine has ever had a seizure. People sometimes faint after medical procedures, including vaccination. Tell your provider if you feel dizzy or have vision changes or ringing in the ears. As with any medicine, there is a very remote chance of a vaccine causing a severe allergic reaction, other serious injury, or death. 5. What if there is a serious problem?     An allergic reaction could occur after the vaccinated person leaves the clinic. If you see signs of a severe allergic reaction (hives, swelling of the face and throat, difficulty breathing, a fast heartbeat, dizziness, or weakness), call 9-1-1 and get the person to the nearest hospital.    For other signs that concern you, call your health care provider. Adverse reactions should be reported to the Vaccine Adverse Event Reporting System (VAERS). Your health care provider will usually file this report, or you can do it yourself. Visit the VAERS website at www.vaers. [facility].gov or call 1-985.100.1230. VAERS is only for reporting reactions, and VAERS staff members do not give medical advice. 6. The National Vaccine Injury Compensation Program    The [facility] Vaccine Injury Compensation Program (VICP) is a federal program that was created to compensate people who may have been injured by certain vaccines. Claims regarding alleged injury or death due to vaccination have a time limit for filing, which may be as short as two years. Visit the VICP website at www.[facility].gov/vaccinecompensation or call 7-878.501.7143 to learn about the program and about filing a claim. 7. How can I learn more? Ask your health care provider. Call your local or state health department. Visit the website of the Food and Drug Administration (FDA) for vaccine package inserts and additional information at www.fda.gov/vaccines-blood-biologics/vaccines. Contact the Centers for Disease Control and Prevention (CDC): Call 3-914.177.7610 (1-800-CDC-INFO) or  Visit CDCs influenza website at www.cdc.gov/flu. Vaccine Information Statement   Inactivated Influenza Vaccine   8/6/2021  42 JORGE Christopher [id number]   Department of Health and Human Services  Centers for Disease Control and Prevention    Office Use Only

## 2023-03-03 ENCOUNTER — OFFICE VISIT (OUTPATIENT)
Dept: FAMILY MEDICINE CLINIC | Age: 53
End: 2023-03-03

## 2023-03-03 VITALS
RESPIRATION RATE: 14 BRPM | HEART RATE: 76 BPM | SYSTOLIC BLOOD PRESSURE: 110 MMHG | OXYGEN SATURATION: 98 % | DIASTOLIC BLOOD PRESSURE: 77 MMHG | BODY MASS INDEX: 30.19 KG/M2 | WEIGHT: 176.8 LBS | TEMPERATURE: 98.2 F | HEIGHT: 64 IN

## 2023-03-03 DIAGNOSIS — I10 ESSENTIAL HYPERTENSION: Primary | ICD-10-CM

## 2023-03-03 DIAGNOSIS — E11.9 TYPE 2 DIABETES MELLITUS WITHOUT COMPLICATION, WITHOUT LONG-TERM CURRENT USE OF INSULIN (HCC): ICD-10-CM

## 2023-03-03 DIAGNOSIS — L65.9 HAIR THINNING: ICD-10-CM

## 2023-03-03 DIAGNOSIS — D50.8 OTHER IRON DEFICIENCY ANEMIA: ICD-10-CM

## 2023-03-03 DIAGNOSIS — E53.8 LOW SERUM VITAMIN B12: ICD-10-CM

## 2023-03-03 DIAGNOSIS — Z12.31 ENCOUNTER FOR SCREENING MAMMOGRAM FOR BREAST CANCER: ICD-10-CM

## 2023-03-03 DIAGNOSIS — Z51.81 ENCOUNTER FOR MEDICATION MONITORING: ICD-10-CM

## 2023-03-03 DIAGNOSIS — E55.9 HYPOVITAMINOSIS D: ICD-10-CM

## 2023-03-03 DIAGNOSIS — E78.00 HYPERCHOLESTEROLEMIA: ICD-10-CM

## 2023-03-03 DIAGNOSIS — K50.90 CROHN'S DISEASE WITHOUT COMPLICATION, UNSPECIFIED GASTROINTESTINAL TRACT LOCATION (HCC): ICD-10-CM

## 2023-03-03 LAB
GLUCOSE POC: 111 MG/DL
HBA1C MFR BLD HPLC: 6 %

## 2023-03-03 NOTE — PROGRESS NOTES
Patient identified by 2 identifiers. Chief Complaint   Patient presents with    Follow-up    Diabetes     1. Have you been to the ER, urgent care clinic since your last visit? Hospitalized since your last visit? No    2. Have you seen or consulted any other health care providers outside of the [address] [place] [address] since your last visit? Include any pap smears or colon screening.  No

## 2023-03-03 NOTE — PROGRESS NOTES
HISTORY OF PRESENT ILLNESS  Milton Caputo is a 47 y.o. female. HPI   Follow up on chronic medical problems. She is feeling well. Has hx of Crohn's disease being followed by GI. Brother passed away on last week. Unknown cause of death but was paralyzed over the past 10 years from [address]. Waiting on ME report. Cardiovascular Review:  The patient has hypertension. Started on olmesartan for renal protection with hx of diabetes. Diet and Lifestyle: generally follows a low fat low cholesterol diet, generally follows a low sodium diet, exercises sporadically  Home BP Monitoring: is not measured at home. Pertinent ROS: taking medications as instructed, no medication side effects noted, no TIA's, no chest pain on exertion, no dyspnea on exertion, no swelling of ankles, no palpitations. No headaches or dizziness. DM type II follow up:  Compliant w/ meds, diabetic diet, and exercise. Obtains home glucose monitoring averaging 100-140. Checks BS BID on most days and prn. Pt does not have BS log at visit today. No Rf needed for today. Denies any tingling sensation, polyuria and polydipsia. No blurred vision. No significant weight changes. Feeling well since last OV. HM:  Mammogram 03/14/22  Colonoscopy 05/16/17 10 yrs.   Pap Smear 11/23/21  Patient Active Problem List   Diagnosis Code    Hypovitaminosis D E55.9    Herpes genitalia A60.00    Anemia D64.9    IGT (impaired glucose tolerance) R73.02    [facility] disease without complication ([facility] O25.93    Encounter for medication monitoring Z51.81    Non morbid obesity E66.9       Current Outpatient Medications   Medication Sig Dispense Refill    olmesartan (BENICAR) 5 mg tablet Take 1 Tablet by mouth nightly. 30 Tablet 11    potassium chloride (KLOR-CON M10) 10 mEq tablet Take 2 Tablets by mouth two (2) times a day. 360 Tablet 3    metFORMIN ER (GLUCOPHAGE XR) 500 mg tablet Take 1 Tablet by mouth two (2) times daily (with meals).  60 Tablet 11 amLODIPine (NORVASC) 10 mg tablet Take 0.5 Tablets by mouth daily. 90 Tablet 3    lancets (One Touch Delica) 33 gauge misc Use to check BS daily. DX E11.9 300 Lancet 3    lancets (OneTouch Delica Lancets) 30 gauge misc by Does Not Apply route daily. Dx. Code E11.9 100 Lancet 3    vedolizumab (Entyvio) 300 mg injection Infusion is giving every 6 weeks until further notice. lancets (OneTouch UltraSoft Lancets) misc Use to check BS daily DX E11.9 100 Each 11    glucose blood VI test strips (OneTouch Ultra Test) strip Use to check BS daily DX E11.9 100 Strip 11    Blood-Glucose Meter (OneTouch Ultra2 Meter) monitoring kit Use to check BS daily DX E11.9 1 Kit 0    pantoprazole (PROTONIX) 40 mg tablet TAKE ONE TABLET BY MOUTH DAILY AS NEEDED FOR ACID REFLUX 90 Tablet 2    cholecalciferol, vitamin D3, 50 mcg (2,000 unit) tab Take 1 Tab by mouth daily.          Allergies   Allergen Reactions    Ibuprofen Other (comments)     Can't take due to Crohns    Sulfa (Sulfonamide Antibiotics) Nausea Only           Past Medical History:   Diagnosis Date    Crohn disease ([address]     5/2017    Environmental allergies     GERD (gastroesophageal reflux disease)     Herpes genitalia            Past Surgical History:   Procedure Laterality Date    HX GYN  1991    Laser- HPV           Family History   Problem Relation Age of Onset    Heart Disease Mother     Bleeding Prob Mother         was on coumadin    Stroke Father     Diabetes Father     Cancer Sister         lymphoma    Diabetes Brother     Schizophrenia Brother     Diabetes Sister     Diabetes Sister     Mental Retardation Sister     No Known Problems Sister     Other Brother         paralysis from car accident    Heart Disease Brother     Pacemaker Brother     Kidney Disease Brother     Crohn's Disease Brother        Social History     Tobacco Use    Smoking status: Never    Smokeless tobacco: Never   Substance Use Topics    Alcohol use: Yes     Comment: edi        Lab Results Component Value Date/Time    WBC 8.8 09/02/2022 12:00 AM    HGB 11.9 09/02/2022 12:00 AM    HCT 37.7 09/02/2022 12:00 AM    PLATELET 207 34/56/6395 12:00 AM    MCV 74 (L) 09/02/2022 12:00 AM     Lab Results   Component Value Date/Time    Cholesterol, total 215 (H) 03/15/2022 12:00 AM    HDL Cholesterol 51 03/15/2022 12:00 AM    LDL, calculated 144 (H) 03/15/2022 12:00 AM    LDL, calculated 125.2 (H) 11/15/2021 04:59 PM    Triglyceride 111 03/15/2022 12:00 AM    CHOL/HDL Ratio 4.3 11/15/2021 04:59 PM     Lab Results   Component Value Date/Time    TSH 1.250 09/02/2015 10:21 AM      Lab Results   Component Value Date/Time    Sodium 142 04/22/2022 12:00 AM    Potassium 4.4 04/22/2022 12:00 AM    Chloride 101 04/22/2022 12:00 AM    CO2 25 04/22/2022 12:00 AM    Anion gap 7 11/15/2021 04:59 PM    Glucose 101 (H) 04/22/2022 12:00 AM    BUN 12 04/22/2022 12:00 AM    Creatinine 0.97 04/22/2022 12:00 AM    BUN/Creatinine ratio 12 04/22/2022 12:00 AM    GFR est AA >60 11/15/2021 04:59 PM    GFR est non-AA >60 11/15/2021 04:59 PM    Calcium 10.0 04/22/2022 12:00 AM    Bilirubin, total 0.3 03/15/2022 12:00 AM    ALT (SGPT) 10 03/15/2022 12:00 AM    Alk. phosphatase 108 03/15/2022 12:00 AM    Protein, total 7.2 03/15/2022 12:00 AM    Albumin 4.6 03/15/2022 12:00 AM    Globulin 3.7 08/04/2021 10:39 AM    A-G Ratio 1.8 03/15/2022 12:00 AM      Lab Results   Component Value Date/Time    Hemoglobin A1c 6.6 (H) 04/05/2021 12:00 AM    Hemoglobin A1c (POC) 6.2 09/02/2022 10:18 AM         Review of Systems   Constitutional:  Negative for malaise/fatigue. HENT:  Negative for congestion. Eyes:  Negative for blurred vision. Respiratory:  Negative for cough and shortness of breath. Cardiovascular:  Negative for chest pain, palpitations and leg swelling. Gastrointestinal:  Negative for abdominal pain, constipation and heartburn. Genitourinary:  Negative for dysuria, frequency and urgency.    Musculoskeletal:  Negative for back pain and joint pain. Skin:         C/o hair thinning over the past few months. Wants to see derm. Neurological:  Negative for dizziness, tingling and headaches. Endo/Heme/Allergies:  Negative for environmental allergies. Psychiatric/Behavioral:  Negative for depression. The patient does not have insomnia. Physical Exam  Vitals and nursing note reviewed. Constitutional:       Appearance: Normal appearance. She is well-developed. Comments: /77   Pulse 76   Temp 98.2 °F (36.8 °C)   Resp 14   Ht 5' 4\" (1.626 m)   Wt 176 lb 12.8 oz (80.2 kg)   SpO2 98%   BMI 30.35 kg/m²    HENT:      Right Ear: Tympanic membrane and ear canal normal.      Left Ear: Tympanic membrane and ear canal normal.   Neck:      Thyroid: No thyromegaly. Cardiovascular:      Rate and Rhythm: Normal rate and regular rhythm. Heart sounds: Normal heart sounds. Pulmonary:      Effort: Pulmonary effort is normal.      Breath sounds: Normal breath sounds. Abdominal:      General: Bowel sounds are normal.      Palpations: Abdomen is soft. There is no mass. Tenderness: There is no abdominal tenderness. Musculoskeletal:         General: Normal range of motion. Cervical back: Normal range of motion and neck supple. Right lower leg: No edema. Left lower leg: No edema. Lymphadenopathy:      Cervical: No cervical adenopathy. Skin:     General: Skin is warm and dry. Neurological:      General: No focal deficit present. Mental Status: She is alert and oriented to person, place, and time. Psychiatric:         Mood and Affect: Mood normal.       ASSESSMENT and PLAN  Diagnoses and all orders for this visit:    1. Essential hypertension  Stable     2. Type 2 diabetes mellitus without complication, without long-term current use of insulin ([facility])  A1c stable at 6.0%.    -     AMB POC HEMOGLOBIN A1C  -     AMB POC GLUCOSE, QUANTITATIVE, BLOOD    3. Hypercholesterolemia  Continue to monitor. Work on diet and exercise.  -     LIPID PANEL; Future    4. Crohn's disease without complication, unspecified gastrointestinal tract location ([address]  Stable as per GI.      5. Hypovitaminosis D  -     VITAMIN D, 25 HYDROXY; Future    6. Low serum vitamin B12  -     VITAMIN B12; Future    7. Other iron deficiency anemia  -     CBC W/O DIFF; Future  -     IRON PROFILE; Future  -     FERRITIN; Future    8. Hair thinning  -     TSH 3RD GENERATION; Future  -     REFERRAL TO DERMATOLOGY    9. Encounter for medication monitoring  -     METABOLIC PANEL, COMPREHENSIVE; Future    10. Encounter for screening mammogram for breast cancer  -     [facility] 3D LIZBETH W MAMMO BI SCREENING INCL CAD; Future      Follow-up and Dispositions    Return in about 6 months (around 9/3/2023). current treatment plan is effective, no change in therapy  reviewed diet, exercise and weight control  cardiovascular risk and specific lipid/LDL goals reviewed  reviewed medications and side effects in detail  specific diabetic recommendations: low cholesterol diet, weight control and daily exercise discussed, all medications, side effects and compliance discussed carefully, foot care discussed and Podiatry visits discussed, annual eye examinations at Ophthalmology discussed, and glycohemoglobin and other lab monitoring discussed    I have discussed diagnosis listed in this note with pt and/or family. I have discussed treatment plans and options and the risk/benefit analysis of those options, including safe use of medications and possible medication side effects. Through the use of shared decision making we have agreed to the above plan. The patient has received an after-visit summary and questions were answered concerning future plans and follow up. Advise pt of any urgent changes then to proceed to the ER.

## 2023-03-04 LAB
25(OH)D3+25(OH)D2 SERPL-MCNC: 33.6 NG/ML (ref 30–100)
ALBUMIN SERPL-MCNC: 4.4 G/DL (ref 3.8–4.9)
ALBUMIN/GLOB SERPL: 1.7 {RATIO} (ref 1.2–2.2)
ALP SERPL-CCNC: 100 IU/L (ref 44–121)
ALT SERPL-CCNC: 10 IU/L (ref 0–32)
AST SERPL-CCNC: 16 IU/L (ref 0–40)
BILIRUB SERPL-MCNC: 0.3 MG/DL (ref 0–1.2)
BUN SERPL-MCNC: 10 MG/DL (ref 6–24)
BUN/CREAT SERPL: 12 (ref 9–23)
CALCIUM SERPL-MCNC: 10.3 MG/DL (ref 8.7–10.2)
CHLORIDE SERPL-SCNC: 102 MMOL/L (ref 96–106)
CHOLEST SERPL-MCNC: 209 MG/DL (ref 100–199)
CO2 SERPL-SCNC: 25 MMOL/L (ref 20–29)
CREAT SERPL-MCNC: 0.85 MG/DL (ref 0.57–1)
EGFRCR SERPLBLD CKD-EPI 2021: 82 ML/MIN/1.73
ERYTHROCYTE [DISTWIDTH] IN BLOOD BY AUTOMATED COUNT: 15.4 % (ref 11.7–15.4)
FERRITIN SERPL-MCNC: 30 NG/ML (ref 15–150)
GLOBULIN SER CALC-MCNC: 2.6 G/DL (ref 1.5–4.5)
GLUCOSE SERPL-MCNC: 100 MG/DL (ref 70–99)
HCT VFR BLD AUTO: 36.2 % (ref 34–46.6)
HDLC SERPL-MCNC: 56 MG/DL
HGB BLD-MCNC: 11.7 G/DL (ref 11.1–15.9)
IMP & REVIEW OF LAB RESULTS: NORMAL
IRON SATN MFR SERPL: 11 % (ref 15–55)
IRON SERPL-MCNC: 42 UG/DL (ref 27–159)
LDLC SERPL CALC-MCNC: 135 MG/DL (ref 0–99)
MCH RBC QN AUTO: 23.8 PG (ref 26.6–33)
MCHC RBC AUTO-ENTMCNC: 32.3 G/DL (ref 31.5–35.7)
MCV RBC AUTO: 74 FL (ref 79–97)
PLATELET # BLD AUTO: 370 X10E3/UL (ref 150–450)
POTASSIUM SERPL-SCNC: 4.9 MMOL/L (ref 3.5–5.2)
PROT SERPL-MCNC: 7 G/DL (ref 6–8.5)
RBC # BLD AUTO: 4.91 X10E6/UL (ref 3.77–5.28)
SODIUM SERPL-SCNC: 141 MMOL/L (ref 134–144)
TIBC SERPL-MCNC: 366 UG/DL (ref 250–450)
TRIGL SERPL-MCNC: 102 MG/DL (ref 0–149)
TSH SERPL DL<=0.005 MIU/L-ACNC: 1.38 UIU/ML (ref 0.45–4.5)
UIBC SERPL-MCNC: 324 UG/DL (ref 131–425)
VIT B12 SERPL-MCNC: 396 PG/ML (ref 232–1245)
VLDLC SERPL CALC-MCNC: 18 MG/DL (ref 5–40)
WBC # BLD AUTO: 8.1 X10E3/UL (ref 3.4–10.8)

## 2023-03-07 RX ORDER — ATORVASTATIN CALCIUM 10 MG/1
10 TABLET, FILM COATED ORAL
Qty: 30 TABLET | Refills: 5 | Status: SHIPPED | OUTPATIENT
Start: 2023-03-07

## 2023-03-22 ENCOUNTER — TELEPHONE (OUTPATIENT)
Dept: FAMILY MEDICINE CLINIC | Age: 53
End: 2023-03-22

## 2023-03-22 NOTE — TELEPHONE ENCOUNTER
Pt states she  missed your call and could you please call her back if you did call her.   [phone]

## 2023-03-22 NOTE — TELEPHONE ENCOUNTER
Patient called stating she is returning a missed call from the nurse. She requested a call back and can be reached at [phone].

## 2023-04-27 ENCOUNTER — HOSPITAL ENCOUNTER (OUTPATIENT)
Dept: MAMMOGRAPHY | Age: 53
Discharge: HOME | End: 2023-04-27
Attending: FAMILY MEDICINE
Payer: COMMERCIAL

## 2023-04-27 DIAGNOSIS — Z12.31 ENCOUNTER FOR SCREENING MAMMOGRAM FOR BREAST CANCER: ICD-10-CM

## 2023-04-27 PROCEDURE — 77063 BREAST TOMOSYNTHESIS BI: CPT

## 2023-06-06 RX ORDER — PANTOPRAZOLE SODIUM 40 MG/1
TABLET, DELAYED RELEASE ORAL
Qty: 90 TABLET | Refills: 3 | Status: SHIPPED | OUTPATIENT
Start: 2023-06-06

## 2023-06-06 NOTE — TELEPHONE ENCOUNTER
Last appointment: 3/3/23  Next appointment: 9/6/23  Previous refill encounter(s): 6/22/21 #90 with 2 refills    Requested Prescriptions     Pending Prescriptions Disp Refills    pantoprazole (PROTONIX) 40 MG tablet 90 tablet 3     Sig: TAKE ONE TABLET BY MOUTH DAILY AS NEEDED FOR ACID REFLUX         For Pharmacy Admin Tracking Only    Program: Medication Refill  CPA in place:    Recommendation Provided To:    Intervention Detail: New Rx: 1, reason: Patient Preference  Intervention Accepted By:   Edwin Felix Closed?:    Time Spent (min): 5

## 2023-09-06 ENCOUNTER — OFFICE VISIT (OUTPATIENT)
Age: 53
End: 2023-09-06
Payer: COMMERCIAL

## 2023-09-06 VITALS
HEART RATE: 86 BPM | RESPIRATION RATE: 19 BRPM | BODY MASS INDEX: 32.25 KG/M2 | SYSTOLIC BLOOD PRESSURE: 113 MMHG | OXYGEN SATURATION: 99 % | TEMPERATURE: 97.2 F | WEIGHT: 182 LBS | HEIGHT: 63 IN | DIASTOLIC BLOOD PRESSURE: 71 MMHG

## 2023-09-06 DIAGNOSIS — K50.90 CROHN'S DISEASE WITHOUT COMPLICATION, UNSPECIFIED GASTROINTESTINAL TRACT LOCATION (HCC): ICD-10-CM

## 2023-09-06 DIAGNOSIS — I10 ESSENTIAL (PRIMARY) HYPERTENSION: Primary | ICD-10-CM

## 2023-09-06 DIAGNOSIS — Z79.899 ENCOUNTER FOR LONG-TERM (CURRENT) USE OF MEDICATIONS: ICD-10-CM

## 2023-09-06 DIAGNOSIS — E11.9 TYPE 2 DIABETES MELLITUS WITHOUT COMPLICATION, WITHOUT LONG-TERM CURRENT USE OF INSULIN (HCC): ICD-10-CM

## 2023-09-06 DIAGNOSIS — E78.00 HYPERCHOLESTEROLEMIA: ICD-10-CM

## 2023-09-06 DIAGNOSIS — Z11.4 SCREENING FOR HIV (HUMAN IMMUNODEFICIENCY VIRUS): ICD-10-CM

## 2023-09-06 DIAGNOSIS — E66.9 NON MORBID OBESITY: ICD-10-CM

## 2023-09-06 LAB
GLUCOSE, POC: 93 MG/DL
HBA1C MFR BLD: 6.2 %

## 2023-09-06 PROCEDURE — 3078F DIAST BP <80 MM HG: CPT | Performed by: FAMILY MEDICINE

## 2023-09-06 PROCEDURE — 82962 GLUCOSE BLOOD TEST: CPT | Performed by: FAMILY MEDICINE

## 2023-09-06 PROCEDURE — 99214 OFFICE O/P EST MOD 30 MIN: CPT | Performed by: FAMILY MEDICINE

## 2023-09-06 PROCEDURE — 83036 HEMOGLOBIN GLYCOSYLATED A1C: CPT | Performed by: FAMILY MEDICINE

## 2023-09-06 PROCEDURE — 3074F SYST BP LT 130 MM HG: CPT | Performed by: FAMILY MEDICINE

## 2023-09-06 RX ORDER — BLOOD-GLUCOSE METER
KIT MISCELLANEOUS
COMMUNITY
Start: 2022-03-15

## 2023-09-06 SDOH — ECONOMIC STABILITY: INCOME INSECURITY: HOW HARD IS IT FOR YOU TO PAY FOR THE VERY BASICS LIKE FOOD, HOUSING, MEDICAL CARE, AND HEATING?: NOT HARD AT ALL

## 2023-09-06 SDOH — ECONOMIC STABILITY: FOOD INSECURITY: WITHIN THE PAST 12 MONTHS, THE FOOD YOU BOUGHT JUST DIDN'T LAST AND YOU DIDN'T HAVE MONEY TO GET MORE.: NEVER TRUE

## 2023-09-06 SDOH — ECONOMIC STABILITY: FOOD INSECURITY: WITHIN THE PAST 12 MONTHS, YOU WORRIED THAT YOUR FOOD WOULD RUN OUT BEFORE YOU GOT MONEY TO BUY MORE.: NEVER TRUE

## 2023-09-06 SDOH — ECONOMIC STABILITY: HOUSING INSECURITY
IN THE LAST 12 MONTHS, WAS THERE A TIME WHEN YOU DID NOT HAVE A STEADY PLACE TO SLEEP OR SLEPT IN A SHELTER (INCLUDING NOW)?: NO

## 2023-09-06 ASSESSMENT — PATIENT HEALTH QUESTIONNAIRE - PHQ9
SUM OF ALL RESPONSES TO PHQ QUESTIONS 1-9: 0
SUM OF ALL RESPONSES TO PHQ QUESTIONS 1-9: 0
2. FEELING DOWN, DEPRESSED OR HOPELESS: 0
SUM OF ALL RESPONSES TO PHQ9 QUESTIONS 1 & 2: 0
SUM OF ALL RESPONSES TO PHQ QUESTIONS 1-9: 0
SUM OF ALL RESPONSES TO PHQ QUESTIONS 1-9: 0
2. FEELING DOWN, DEPRESSED OR HOPELESS: 0
SUM OF ALL RESPONSES TO PHQ QUESTIONS 1-9: 0
SUM OF ALL RESPONSES TO PHQ QUESTIONS 1-9: 0
SUM OF ALL RESPONSES TO PHQ9 QUESTIONS 1 & 2: 0
1. LITTLE INTEREST OR PLEASURE IN DOING THINGS: 0
1. LITTLE INTEREST OR PLEASURE IN DOING THINGS: 0
SUM OF ALL RESPONSES TO PHQ QUESTIONS 1-9: 0
SUM OF ALL RESPONSES TO PHQ QUESTIONS 1-9: 0

## 2023-09-06 ASSESSMENT — ANXIETY QUESTIONNAIRES
7. FEELING AFRAID AS IF SOMETHING AWFUL MIGHT HAPPEN: 0
5. BEING SO RESTLESS THAT IT IS HARD TO SIT STILL: 0
1. FEELING NERVOUS, ANXIOUS, OR ON EDGE: 0
GAD7 TOTAL SCORE: 0
GAD7 TOTAL SCORE: 0
6. BECOMING EASILY ANNOYED OR IRRITABLE: 0
3. WORRYING TOO MUCH ABOUT DIFFERENT THINGS: 0
2. NOT BEING ABLE TO STOP OR CONTROL WORRYING: 0
4. TROUBLE RELAXING: 0

## 2023-09-06 ASSESSMENT — ENCOUNTER SYMPTOMS
SHORTNESS OF BREATH: 0
CONSTIPATION: 0
RHINORRHEA: 0
CHEST TIGHTNESS: 0
BLOOD IN STOOL: 0
COUGH: 0
ABDOMINAL PAIN: 0

## 2023-09-06 NOTE — PROGRESS NOTES
Chief Complaint   Patient presents with    6 Month Follow-Up         Here for 6 month follow up. 1. Have you been to the ER, urgent care clinic since your last visit? Hospitalized since your last visit? No    2. Have you seen or consulted any other health care providers outside of the [address] [place] [address] since your last visit? Include any pap smears or colon screening.  No

## 2023-09-06 NOTE — PROGRESS NOTES
Subjective:      Patient ID: Gio Grajeda is a 46 y.o. female. HPI  Follow up on chronic medical problems. She is feeling well. Has hx of Crohn's disease being followed by GI. Cardiovascular Review:  The patient has hypertension. Started on olmesartan for renal protection with hx of diabetes. Diet and Lifestyle: generally follows a low fat low cholesterol diet, generally follows a low sodium diet, exercises sporadically  Home BP Monitoring: is not measured at home. Pertinent ROS: taking medications as instructed, no medication side effects noted, no TIA's, no chest pain on exertion, no dyspnea on exertion, no swelling of ankles, no palpitations. No headaches or dizziness. DM type II follow up:  Compliant w/ meds, diabetic diet, and exercise. Obtains home glucose monitoring averaging 100-140. Checks BS BID on most days and prn. Pt does not have BS log at visit today. No Rf needed for today. Denies any tingling sensation, polyuria and polydipsia. No blurred vision. No significant weight changes. Feeling well since last OV.   HM:  Mammogram 04/27/23  Colonoscopy 05/16/17       Past Medical History:   Diagnosis Date    Crohn disease ([address] )     5/2017    Environmental allergies     GERD (gastroesophageal reflux disease)     Herpes genitalia      Past Surgical History:   Procedure Laterality Date    GYN  1991    Laser- HPV     Current Outpatient Medications   Medication Sig Dispense Refill    pantoprazole (PROTONIX) 40 MG tablet TAKE ONE TABLET BY MOUTH DAILY AS NEEDED FOR ACID REFLUX 90 tablet 3    amLODIPine (NORVASC) 10 MG tablet Take 5 mg by mouth daily      atorvastatin (LIPITOR) 10 MG tablet Take 10 mg by mouth      Cholecalciferol 50 MCG (2000 UT) TABS Take 1 tablet by mouth daily      metFORMIN (GLUCOPHAGE-XR) 500 MG extended release tablet Take 500 mg by mouth 2 times daily (with meals)      olmesartan (BENICAR) 5 MG tablet Take 5 mg by mouth      potassium chloride (KLOR-CON M) 10 MEQ

## 2023-09-07 LAB
ALBUMIN SERPL-MCNC: 4.5 G/DL (ref 3.8–4.9)
ALBUMIN/GLOB SERPL: 1.7 {RATIO} (ref 1.2–2.2)
ALP SERPL-CCNC: 118 IU/L (ref 44–121)
ALT SERPL-CCNC: 9 IU/L (ref 0–32)
APPEARANCE UR: CLEAR
AST SERPL-CCNC: 14 IU/L (ref 0–40)
BACTERIA #/AREA URNS HPF: ABNORMAL /[HPF]
BILIRUB SERPL-MCNC: 0.3 MG/DL (ref 0–1.2)
BILIRUB UR QL STRIP: NEGATIVE
BUN SERPL-MCNC: 11 MG/DL (ref 6–24)
BUN/CREAT SERPL: 13 (ref 9–23)
CALCIUM SERPL-MCNC: 10 MG/DL (ref 8.7–10.2)
CASTS URNS QL MICRO: ABNORMAL /LPF
CHLORIDE SERPL-SCNC: 102 MMOL/L (ref 96–106)
CHOLEST SERPL-MCNC: 176 MG/DL (ref 100–199)
CO2 SERPL-SCNC: 22 MMOL/L (ref 20–29)
COLOR UR: YELLOW
CREAT SERPL-MCNC: 0.85 MG/DL (ref 0.57–1)
EGFRCR SERPLBLD CKD-EPI 2021: 82 ML/MIN/1.73
EPI CELLS #/AREA URNS HPF: ABNORMAL /HPF (ref 0–10)
GLOBULIN SER CALC-MCNC: 2.7 G/DL (ref 1.5–4.5)
GLUCOSE SERPL-MCNC: 101 MG/DL (ref 70–99)
GLUCOSE UR QL STRIP: NEGATIVE
HDLC SERPL-MCNC: 53 MG/DL
HGB UR QL STRIP: NEGATIVE
HIV 1+2 AB+HIV1 P24 AG SERPL QL IA: NON REACTIVE
KETONES UR QL STRIP: NEGATIVE
LDLC SERPL CALC-MCNC: 104 MG/DL (ref 0–99)
LEUKOCYTE ESTERASE UR QL STRIP: ABNORMAL
MICRO URNS: ABNORMAL
NITRITE UR QL STRIP: NEGATIVE
PH UR STRIP: 5 [PH] (ref 5–7.5)
POTASSIUM SERPL-SCNC: 4.6 MMOL/L (ref 3.5–5.2)
PROT SERPL-MCNC: 7.2 G/DL (ref 6–8.5)
PROT UR QL STRIP: NEGATIVE
RBC #/AREA URNS HPF: ABNORMAL /HPF (ref 0–2)
SODIUM SERPL-SCNC: 141 MMOL/L (ref 134–144)
SP GR UR STRIP: 1.01 (ref 1–1.03)
TRIGL SERPL-MCNC: 105 MG/DL (ref 0–149)
UROBILINOGEN UR STRIP-MCNC: 0.2 MG/DL (ref 0.2–1)
VLDLC SERPL CALC-MCNC: 19 MG/DL (ref 5–40)
WBC #/AREA URNS HPF: ABNORMAL /HPF (ref 0–5)

## 2023-10-25 ENCOUNTER — NURSE ONLY (OUTPATIENT)
Age: 53
End: 2023-10-25
Payer: COMMERCIAL

## 2023-10-25 VITALS — TEMPERATURE: 98.6 F

## 2023-10-25 DIAGNOSIS — Z23 ENCOUNTER FOR IMMUNIZATION: Primary | ICD-10-CM

## 2023-10-25 PROCEDURE — 90756 CCIIV4 VACC ABX FREE IM: CPT | Performed by: FAMILY MEDICINE

## 2023-10-25 PROCEDURE — 90471 IMMUNIZATION ADMIN: CPT | Performed by: FAMILY MEDICINE

## 2023-10-25 NOTE — PROGRESS NOTES
Chief Complaint   Patient presents with    Flu Vaccine         Here for annual flu vaccine. Tolerated well, with no adverse reactions noted. Orders placed for influenza vaccine.   Verified by [address] with provider dr. Tracy Shafer

## 2023-12-06 ENCOUNTER — TELEPHONE (OUTPATIENT)
Age: 53
End: 2023-12-06

## 2023-12-06 RX ORDER — AMLODIPINE BESYLATE 10 MG/1
5 TABLET ORAL DAILY
Qty: 45 TABLET | Refills: 3 | Status: SHIPPED | OUTPATIENT
Start: 2023-12-06

## 2023-12-06 NOTE — TELEPHONE ENCOUNTER
Last appointment: 9/6/23  Next appointment: 3/6/24  Previous refill encounter(s): 11/4/22    Requested Prescriptions     Pending Prescriptions Disp Refills    amLODIPine (NORVASC) 10 MG tablet [Pharmacy Med Name: amLODIPine BESYLATE 10 MG TAB] 45 tablet 3     Sig: TAKE 1/2 TABLET BY MOUTH DAILY         For Pharmacy Admin Tracking Only    Program: Medication Refill  CPA in place:    Recommendation Provided To:   Intervention Detail: New Rx: 1, reason: Patient Preference  Intervention Accepted By:   Gap Closed?:    Time Spent (min): 5

## 2023-12-07 ENCOUNTER — OFFICE VISIT (OUTPATIENT)
Age: 53
End: 2023-12-07
Payer: COMMERCIAL

## 2023-12-07 VITALS
OXYGEN SATURATION: 98 % | SYSTOLIC BLOOD PRESSURE: 124 MMHG | HEIGHT: 63 IN | RESPIRATION RATE: 18 BRPM | HEART RATE: 96 BPM | DIASTOLIC BLOOD PRESSURE: 82 MMHG | TEMPERATURE: 98.5 F | BODY MASS INDEX: 31.71 KG/M2 | WEIGHT: 179 LBS

## 2023-12-07 DIAGNOSIS — Z23 ENCOUNTER FOR IMMUNIZATION: Primary | ICD-10-CM

## 2023-12-07 PROCEDURE — 91320 SARSCV2 VAC 30MCG TRS-SUC IM: CPT | Performed by: FAMILY MEDICINE

## 2023-12-07 PROCEDURE — 90480 ADMN SARSCOV2 VAC 1/ONLY CMP: CPT | Performed by: FAMILY MEDICINE

## 2023-12-14 ENCOUNTER — TELEPHONE (OUTPATIENT)
Age: 53
End: 2023-12-14

## 2023-12-14 RX ORDER — NITROFURANTOIN 25; 75 MG/1; MG/1
100 CAPSULE ORAL 2 TIMES DAILY
Qty: 10 CAPSULE | Refills: 0 | Status: SHIPPED | OUTPATIENT
Start: 2023-12-14 | End: 2023-12-19

## 2023-12-14 NOTE — TELEPHONE ENCOUNTER
Pt state she has urinary frequency, retention and lower abdominal pressure x 3 days. Requesting something sent to her pharmacy. Pt allergic to sulfur antibiotics.   [phone]

## 2023-12-14 NOTE — TELEPHONE ENCOUNTER
Patient states that she think that she may have a UTI she want to know if she can come in an give a urine sample I told her Sina Yip was not in today please give her a call @ [phone]

## 2024-03-06 ENCOUNTER — OFFICE VISIT (OUTPATIENT)
Age: 54
End: 2024-03-06
Payer: COMMERCIAL

## 2024-03-06 VITALS
HEART RATE: 77 BPM | RESPIRATION RATE: 18 BRPM | WEIGHT: 181.8 LBS | BODY MASS INDEX: 31.04 KG/M2 | TEMPERATURE: 98.7 F | SYSTOLIC BLOOD PRESSURE: 113 MMHG | HEIGHT: 64 IN | OXYGEN SATURATION: 99 % | DIASTOLIC BLOOD PRESSURE: 81 MMHG

## 2024-03-06 DIAGNOSIS — K21.9 GASTROESOPHAGEAL REFLUX DISEASE, UNSPECIFIED WHETHER ESOPHAGITIS PRESENT: ICD-10-CM

## 2024-03-06 DIAGNOSIS — E11.9 TYPE 2 DIABETES MELLITUS WITHOUT COMPLICATION, WITHOUT LONG-TERM CURRENT USE OF INSULIN (HCC): ICD-10-CM

## 2024-03-06 DIAGNOSIS — Z79.899 ENCOUNTER FOR LONG-TERM (CURRENT) USE OF MEDICATIONS: ICD-10-CM

## 2024-03-06 DIAGNOSIS — I10 ESSENTIAL (PRIMARY) HYPERTENSION: ICD-10-CM

## 2024-03-06 DIAGNOSIS — Z00.00 ROUTINE GENERAL MEDICAL EXAMINATION AT A HEALTH CARE FACILITY: Primary | ICD-10-CM

## 2024-03-06 DIAGNOSIS — Z12.31 ENCOUNTER FOR SCREENING MAMMOGRAM FOR BREAST CANCER: ICD-10-CM

## 2024-03-06 DIAGNOSIS — E55.9 HYPOVITAMINOSIS D: ICD-10-CM

## 2024-03-06 DIAGNOSIS — E78.00 HYPERCHOLESTEROLEMIA: ICD-10-CM

## 2024-03-06 DIAGNOSIS — K50.90 CROHN'S DISEASE WITHOUT COMPLICATION, UNSPECIFIED GASTROINTESTINAL TRACT LOCATION (HCC): ICD-10-CM

## 2024-03-06 DIAGNOSIS — E66.9 NON MORBID OBESITY: ICD-10-CM

## 2024-03-06 DIAGNOSIS — E87.6 HYPOKALEMIA: ICD-10-CM

## 2024-03-06 LAB
GLUCOSE, POC: 117 MG/DL
HBA1C MFR BLD: 6.2 %

## 2024-03-06 PROCEDURE — 3079F DIAST BP 80-89 MM HG: CPT | Performed by: FAMILY MEDICINE

## 2024-03-06 PROCEDURE — 82962 GLUCOSE BLOOD TEST: CPT | Performed by: FAMILY MEDICINE

## 2024-03-06 PROCEDURE — 90715 TDAP VACCINE 7 YRS/> IM: CPT | Performed by: FAMILY MEDICINE

## 2024-03-06 PROCEDURE — 99396 PREV VISIT EST AGE 40-64: CPT | Performed by: FAMILY MEDICINE

## 2024-03-06 PROCEDURE — 3074F SYST BP LT 130 MM HG: CPT | Performed by: FAMILY MEDICINE

## 2024-03-06 PROCEDURE — 83036 HEMOGLOBIN GLYCOSYLATED A1C: CPT | Performed by: FAMILY MEDICINE

## 2024-03-06 PROCEDURE — 90471 IMMUNIZATION ADMIN: CPT | Performed by: FAMILY MEDICINE

## 2024-03-06 RX ORDER — METFORMIN HYDROCHLORIDE 500 MG/1
500 TABLET, EXTENDED RELEASE ORAL 2 TIMES DAILY WITH MEALS
Qty: 180 TABLET | Refills: 3 | Status: SHIPPED | OUTPATIENT
Start: 2024-03-06

## 2024-03-06 RX ORDER — AMLODIPINE BESYLATE 10 MG/1
5 TABLET ORAL DAILY
Qty: 45 TABLET | Refills: 3 | Status: SHIPPED | OUTPATIENT
Start: 2024-03-06

## 2024-03-06 RX ORDER — OLMESARTAN MEDOXOMIL 5 MG/1
5 TABLET ORAL DAILY
Qty: 30 TABLET | Refills: 3 | Status: SHIPPED | OUTPATIENT
Start: 2024-03-06

## 2024-03-06 RX ORDER — ATORVASTATIN CALCIUM 10 MG/1
10 TABLET, FILM COATED ORAL DAILY
Qty: 30 TABLET | Refills: 3 | Status: SHIPPED | OUTPATIENT
Start: 2024-03-06

## 2024-03-06 RX ORDER — POTASSIUM CHLORIDE 750 MG/1
20 TABLET, EXTENDED RELEASE ORAL 2 TIMES DAILY
Qty: 60 TABLET | Refills: 3 | Status: SHIPPED | OUTPATIENT
Start: 2024-03-06

## 2024-03-06 RX ORDER — PANTOPRAZOLE SODIUM 40 MG/1
TABLET, DELAYED RELEASE ORAL
Qty: 90 TABLET | Refills: 3 | Status: SHIPPED | OUTPATIENT
Start: 2024-03-06

## 2024-03-06 ASSESSMENT — PATIENT HEALTH QUESTIONNAIRE - PHQ9
SUM OF ALL RESPONSES TO PHQ9 QUESTIONS 1 & 2: 1
SUM OF ALL RESPONSES TO PHQ QUESTIONS 1-9: 1
2. FEELING DOWN, DEPRESSED OR HOPELESS: 1
1. LITTLE INTEREST OR PLEASURE IN DOING THINGS: 0
SUM OF ALL RESPONSES TO PHQ QUESTIONS 1-9: 1

## 2024-03-06 NOTE — PROGRESS NOTES
Chief Complaint   Patient presents with    Annual Exam       \"Have you been to the ER, urgent care clinic since your last visit?  Hospitalized since your last visit?\"    NO    “Have you seen or consulted any other health care providers outside of [facility] since your last visit?”    NO       Per orders of Dr. Ryder, injection of Tdap was given in the Left deltoid . Patient tolerated it well. Patient instructed to report any adverse reaction to me immediately.      Vitals:    24 1015   BP: 113/81   Pulse: 77   Resp: 18   Temp: 98.7 °F (37.1 °C)   SpO2: 99%       Health Maintenance Due   Topic Date Due    Pneumococcal 0-64 years Vaccine (2 - PCV) 10/03/2019    Diabetic retinal exam  2023    DTaP/Tdap/Td vaccine (2 - Td or Tdap) 2024        The patient, Maria M Hinds, identity was verified by name and .

## 2024-03-06 NOTE — PROGRESS NOTES
Subjective:   Maria M Hinds is a 53 y.o. female for Well Woman Check.  No LMP recorded. (Menstrual status: Menopause).  Sees GYN for pap and pelvic.      Has hx of Crohn's disease being followed by GI.    Cardiovascular Review:  The patient has hypertension.  Started on olmesartan for renal protection with hx of diabetes.    Diet and Lifestyle: generally follows a low fat low cholesterol diet, generally follows a low sodium diet, exercises sporadically  Home BP Monitoring: is not measured at home.  Pertinent ROS: taking medications as instructed, no medication side effects noted, no TIA's, no chest pain on exertion, no dyspnea on exertion, no swelling of ankles, no palpitations.  No headaches or dizziness.  DM type II follow up:  Compliant w/ meds, diabetic diet, and exercise.  Obtains home glucose monitoring averaging 100-140.  Checks BS BID on most days and prn.  Pt does not have BS log at visit today.   Denies any tingling sensation, polyuria and polydipsia.  No blurred vision.  No significant weight changes.  Feeling well since last OV.  HM:  Mammogram 04/27/23  Colonoscopy 05/16/17     Patient Active Problem List   Diagnosis    IGT (impaired glucose tolerance)    Encounter for medication monitoring    Herpes genitalia    Crohn's disease without complication (HCC)    Hypovitaminosis D    Non morbid obesity    Anemia    Pure hypercholesterolemia, unspecified     Patient Active Problem List    Diagnosis Date Noted    Pure hypercholesterolemia, unspecified 09/06/2023    Encounter for medication monitoring 02/05/2018    Non morbid obesity 02/05/2018    IGT (impaired glucose tolerance) 07/31/2017    Crohn's disease without complication (HCC) 07/31/2017    Anemia 07/31/2017    Herpes genitalia     Hypovitaminosis D 05/24/2010     Current Outpatient Medications   Medication Sig Dispense Refill    amLODIPine (NORVASC) 10 MG tablet Take 0.5 tablets by mouth daily 45 tablet 3    pantoprazole (PROTONIX) 40 MG tablet

## 2024-03-07 LAB
25(OH)D3 SERPL-MCNC: 18.3 NG/ML (ref 30–100)
ALBUMIN SERPL-MCNC: 3.9 G/DL (ref 3.5–5)
ALBUMIN/GLOB SERPL: 1.2 (ref 1.1–2.2)
ANION GAP SERPL CALC-SCNC: 5 MMOL/L (ref 5–15)
AST SERPL-CCNC: 14 U/L (ref 15–37)
BILIRUB SERPL-MCNC: 0.5 MG/DL (ref 0.2–1)
BUN SERPL-MCNC: 12 MG/DL (ref 6–20)
BUN/CREAT SERPL: 14 (ref 12–20)
CALCIUM SERPL-MCNC: 9.8 MG/DL (ref 8.5–10.1)
CHLORIDE SERPL-SCNC: 105 MMOL/L (ref 97–108)
CHOLEST SERPL-MCNC: 160 MG/DL
CO2 SERPL-SCNC: 28 MMOL/L (ref 21–32)
CREAT SERPL-MCNC: 0.87 MG/DL (ref 0.55–1.02)
ERYTHROCYTE [DISTWIDTH] IN BLOOD BY AUTOMATED COUNT: 15 % (ref 11.5–14.5)
FERRITIN SERPL-MCNC: 22 NG/ML (ref 26–388)
GLOBULIN SER CALC-MCNC: 3.2 G/DL (ref 2–4)
GLUCOSE SERPL-MCNC: 98 MG/DL (ref 65–100)
HDLC SERPL-MCNC: 57 MG/DL
HDLC SERPL: 2.8 (ref 0–5)
HGB BLD-MCNC: 11.6 G/DL (ref 11.5–16)
IRON SATN MFR SERPL: 11 % (ref 20–50)
IRON SERPL-MCNC: 46 UG/DL (ref 35–150)
LDLC SERPL CALC-MCNC: 86 MG/DL (ref 0–100)
MCH RBC QN AUTO: 23.5 PG (ref 26–34)
MCHC RBC AUTO-ENTMCNC: 31.7 G/DL (ref 30–36.5)
MCV RBC AUTO: 74.1 FL (ref 80–99)
NRBC # BLD: 0 K/UL (ref 0–0.01)
NRBC BLD-RTO: 0 PER 100 WBC
PLATELET # BLD AUTO: 341 K/UL (ref 150–400)
PMV BLD AUTO: 9.2 FL (ref 8.9–12.9)
POTASSIUM SERPL-SCNC: 4.4 MMOL/L (ref 3.5–5.1)
RBC # BLD AUTO: 4.94 M/UL (ref 3.8–5.2)
TIBC SERPL-MCNC: 436 UG/DL (ref 250–450)
TRIGL SERPL-MCNC: 85 MG/DL
VLDLC SERPL CALC-MCNC: 17 MG/DL
WBC # BLD AUTO: 9.5 K/UL (ref 3.6–11)

## 2024-03-07 RX ORDER — ERGOCALCIFEROL 1.25 MG/1
50000 CAPSULE ORAL WEEKLY
Qty: 4 CAPSULE | Refills: 5 | Status: SHIPPED | OUTPATIENT
Start: 2024-03-07

## 2024-04-11 ENCOUNTER — HOSPITAL ENCOUNTER (OUTPATIENT)
Facility: HOSPITAL | Age: 54
Discharge: HOME | End: 2024-04-11
Attending: INTERNAL MEDICINE
Payer: COMMERCIAL

## 2024-04-11 DIAGNOSIS — D64.9 ANEMIA, UNSPECIFIED TYPE: ICD-10-CM

## 2024-04-11 PROCEDURE — 74177 CT ABD & PELVIS W/CONTRAST: CPT

## 2024-04-11 PROCEDURE — 6360000004 HC RX CONTRAST MEDICATION: Performed by: INTERNAL MEDICINE

## 2024-04-11 RX ADMIN — IOPAMIDOL 100 ML: 755 INJECTION, SOLUTION INTRAVENOUS at 15:43

## 2024-04-29 ENCOUNTER — HOSPITAL ENCOUNTER (OUTPATIENT)
Facility: HOSPITAL | Age: 54
Discharge: HOME | End: 2024-05-02
Attending: FAMILY MEDICINE
Payer: COMMERCIAL

## 2024-04-29 VITALS — BODY MASS INDEX: 31.05 KG/M2 | HEIGHT: 64 IN | WEIGHT: 181.88 LBS

## 2024-04-29 DIAGNOSIS — Z12.31 ENCOUNTER FOR SCREENING MAMMOGRAM FOR BREAST CANCER: ICD-10-CM

## 2024-04-29 PROCEDURE — 77067 SCR MAMMO BI INCL CAD: CPT

## 2024-05-07 NOTE — PROGRESS NOTES
Chief Complaint   Patient presents with    Inflammatory Bowel Disease     6m f/u    Anemia     6m f/u    Blood sugar problem     6m f/u     1. Have you been to the ER, urgent care clinic since your last visit? Hospitalized since your last visit? No    2. Have you seen or consulted any other health care providers outside of the [address] [place] [address] since your last visit? Include any pap smears or colon screening. Dr. Porras GI 11/2017 Next appt 2/2018. Eye exam Dr. Mira Kelly 9/2017    Pt c/o dizzy spells when moving, recently increase occurences. Pt is not taking Protonix everyday. Juan Antonio Szymanski is a 49 y.o. female who presents for routine immunizations. She denies any symptoms , reactions or allergies that would exclude them from being immunized today. Risks and adverse reactions were discussed and the VIS was given to them. All questions were addressed. She was observed for 15 min post injection. There were no reactions observed.     India Boyd LPN Stable

## 2024-07-29 ENCOUNTER — TELEPHONE (OUTPATIENT)
Age: 54
End: 2024-07-29

## 2024-07-29 NOTE — TELEPHONE ENCOUNTER
Patient would like a call from  regarding having blood in her urine yesterday and this morning please give her a call @ [phone]

## 2024-07-29 NOTE — TELEPHONE ENCOUNTER
Patient would like a call from  regarding having blood in her urine yesterday and this morning please give her a call @ [phone]  --------------------------------------------------------------    Spoke to the pt and she has been moving stuff around.  She stated she has been drinking water, no back or abdominal pain.  It look like a brownish red color.   Pt states she is doing infusions for Chrohn's Disease and has one due at the end of the month.

## 2024-07-31 ENCOUNTER — OFFICE VISIT (OUTPATIENT)
Age: 54
End: 2024-07-31
Payer: COMMERCIAL

## 2024-07-31 VITALS
BODY MASS INDEX: 31 KG/M2 | DIASTOLIC BLOOD PRESSURE: 78 MMHG | RESPIRATION RATE: 18 BRPM | HEIGHT: 64 IN | WEIGHT: 181.6 LBS | TEMPERATURE: 98.5 F | SYSTOLIC BLOOD PRESSURE: 122 MMHG | OXYGEN SATURATION: 98 % | HEART RATE: 84 BPM

## 2024-07-31 DIAGNOSIS — Z79.899 ENCOUNTER FOR LONG-TERM (CURRENT) USE OF MEDICATIONS: ICD-10-CM

## 2024-07-31 DIAGNOSIS — N30.90 CYSTITIS: ICD-10-CM

## 2024-07-31 DIAGNOSIS — R31.0 GROSS HEMATURIA: ICD-10-CM

## 2024-07-31 DIAGNOSIS — K50.90 CROHN'S DISEASE WITHOUT COMPLICATION, UNSPECIFIED GASTROINTESTINAL TRACT LOCATION (HCC): ICD-10-CM

## 2024-07-31 DIAGNOSIS — E11.9 TYPE 2 DIABETES MELLITUS WITHOUT COMPLICATION, WITHOUT LONG-TERM CURRENT USE OF INSULIN (HCC): ICD-10-CM

## 2024-07-31 DIAGNOSIS — E78.00 HYPERCHOLESTEROLEMIA: ICD-10-CM

## 2024-07-31 DIAGNOSIS — E66.9 NON MORBID OBESITY: ICD-10-CM

## 2024-07-31 DIAGNOSIS — K21.9 GASTROESOPHAGEAL REFLUX DISEASE, UNSPECIFIED WHETHER ESOPHAGITIS PRESENT: ICD-10-CM

## 2024-07-31 DIAGNOSIS — I10 ESSENTIAL (PRIMARY) HYPERTENSION: Primary | ICD-10-CM

## 2024-07-31 LAB
GLUCOSE, POC: 145 MG/DL
HBA1C MFR BLD: 6.1 %

## 2024-07-31 PROCEDURE — 82962 GLUCOSE BLOOD TEST: CPT | Performed by: FAMILY MEDICINE

## 2024-07-31 PROCEDURE — 3078F DIAST BP <80 MM HG: CPT | Performed by: FAMILY MEDICINE

## 2024-07-31 PROCEDURE — 3074F SYST BP LT 130 MM HG: CPT | Performed by: FAMILY MEDICINE

## 2024-07-31 PROCEDURE — 99214 OFFICE O/P EST MOD 30 MIN: CPT | Performed by: FAMILY MEDICINE

## 2024-07-31 PROCEDURE — 83036 HEMOGLOBIN GLYCOSYLATED A1C: CPT | Performed by: FAMILY MEDICINE

## 2024-07-31 RX ORDER — NITROFURANTOIN 25; 75 MG/1; MG/1
100 CAPSULE ORAL 2 TIMES DAILY
Qty: 10 CAPSULE | Refills: 0 | Status: SHIPPED | OUTPATIENT
Start: 2024-07-31 | End: 2024-08-05

## 2024-07-31 ASSESSMENT — PATIENT HEALTH QUESTIONNAIRE - PHQ9
SUM OF ALL RESPONSES TO PHQ QUESTIONS 1-9: 1
SUM OF ALL RESPONSES TO PHQ9 QUESTIONS 1 & 2: 1
SUM OF ALL RESPONSES TO PHQ QUESTIONS 1-9: 1
SUM OF ALL RESPONSES TO PHQ QUESTIONS 1-9: 1
2. FEELING DOWN, DEPRESSED OR HOPELESS: SEVERAL DAYS
1. LITTLE INTEREST OR PLEASURE IN DOING THINGS: NOT AT ALL
SUM OF ALL RESPONSES TO PHQ QUESTIONS 1-9: 1

## 2024-07-31 ASSESSMENT — ENCOUNTER SYMPTOMS
CHEST TIGHTNESS: 0
ABDOMINAL PAIN: 0
COUGH: 0
CONSTIPATION: 0
SHORTNESS OF BREATH: 0
BLOOD IN STOOL: 0
RHINORRHEA: 0

## 2024-07-31 NOTE — PROGRESS NOTES
Chief Complaint   Patient presents with    Hematuria       \"Have you been to the ER, urgent care clinic since your last visit?  Hospitalized since your last visit?\"    NO    “Have you seen or consulted any other health care providers outside of [facility] since your last visit?”    NO             Vitals:    24 1455   BP: 122/78   Pulse: 84   Resp: 18   Temp: 98.5 °F (36.9 °C)   SpO2: 98%       Health Maintenance Due   Topic Date Due    Diabetic retinal exam  2023        The patient, Maria M Hinds, identity was verified by name and .   
gastrointestinal tract location (HCC)  Chronic, stable     Gastroesophageal reflux disease, unspecified whether esophagitis present  Stable on protonix    Encounter for long-term (current) use of medications    Non morbid obesity  I have reviewed/discussed the above normal BMI with the patient.  I have recommended the following interventions: dietary management education, guidance, and counseling .         Return in about 3 months (around 10/31/2024).  reviewed diet, exercise and weight control  cardiovascular risk and specific lipid/LDL goals reviewed  reviewed medications and side effects in detail  specific diabetic recommendations: low cholesterol diet, weight control and daily exercise discussed, all medications, side effects and compliance discussed carefully, foot care discussed and Podiatry visits discussed, annual eye examinations at Ophthalmology discussed, and glycohemoglobin and other lab monitoring discussed           I have discussed diagnosis listed in this note with pt and/or family. I have discussed treatment plans and options and the risk/benefit analysis of those options, including safe use of medications and possible medication side effects.   Through the use of shared decision making we have agreed to the above plan. The patient has received an after-visit summary and questions were answered concerning future plans and follow up.  Advise pt of any urgent changes then to proceed to the ER.            Carmen Camacho MD

## 2024-08-01 ENCOUNTER — TELEPHONE (OUTPATIENT)
Age: 54
End: 2024-08-01

## 2024-08-01 LAB
APPEARANCE UR: CLEAR
BACTERIA SPEC CULT: NORMAL
BACTERIA URNS QL MICRO: NEGATIVE /HPF
BILIRUB UR QL: NEGATIVE
COLOR UR: ABNORMAL
EPITH CASTS URNS QL MICRO: ABNORMAL /LPF
GLUCOSE UR STRIP.AUTO-MCNC: NEGATIVE MG/DL
HGB UR QL STRIP: ABNORMAL
HYALINE CASTS URNS QL MICRO: ABNORMAL /LPF (ref 0–5)
KETONES UR QL STRIP.AUTO: NEGATIVE MG/DL
LEUKOCYTE ESTERASE UR QL STRIP.AUTO: NEGATIVE
NITRITE UR QL STRIP.AUTO: NEGATIVE
PH UR STRIP: 5.5 (ref 5–8)
PROT UR STRIP-MCNC: NEGATIVE MG/DL
RBC #/AREA URNS HPF: ABNORMAL /HPF (ref 0–5)
SERVICE CMNT-IMP: NORMAL
SP GR UR REFRACTOMETRY: 1.01 (ref 1–1.03)
UROBILINOGEN UR QL STRIP.AUTO: 0.2 EU/DL (ref 0.2–1)
WBC URNS QL MICRO: ABNORMAL /HPF (ref 0–4)

## 2024-08-01 NOTE — TELEPHONE ENCOUNTER
Called the pt to see where her last eye exam was she stated Dr. Brandi Mistry.  I call the the office and was told it was faxed back in April of 2024 and she gave me the old fax number.  I gave them the current fax number and she will be faxing it to our office today.

## 2024-08-01 NOTE — TELEPHONE ENCOUNTER
----- Message from Carmen Camacho MD sent at 7/31/2024  5:44 PM EDT -----  Please send for her eye exam from Dr. Manjula Alberto

## 2024-08-07 ENCOUNTER — TELEPHONE (OUTPATIENT)
Age: 54
End: 2024-08-07

## 2024-08-07 NOTE — TELEPHONE ENCOUNTER
Patient would like a call back to discuss getting a Urologist as soon as possible , [phone]

## 2024-08-23 RX ORDER — POTASSIUM CHLORIDE 750 MG/1
20 TABLET, FILM COATED, EXTENDED RELEASE ORAL 2 TIMES DAILY
Qty: 360 TABLET | Refills: 1 | Status: SHIPPED | OUTPATIENT
Start: 2024-08-23

## 2024-08-23 NOTE — TELEPHONE ENCOUNTER
Last appointment: 7/31/24  Next appointment: Advised to follow-up 10/31/24  Previous refill encounter(s): 3/6/24 #60 with 3 refills    Requested Prescriptions     Pending Prescriptions Disp Refills    potassium chloride (KLOR-CON) 10 MEQ extended release tablet [Pharmacy Med Name: POTASSIUM CL ER 10 MEQ TABLET] 360 tablet 1     Sig: TAKE TWO TABLETS BY MOUTH TWICE A DAY         For Pharmacy Admin Tracking Only    Program: Medication Refill  CPA in place:    Recommendation Provided To:   Intervention Detail: New Rx: 1, reason: Patient Preference  Intervention Accepted By:   Gap Closed?:    Time Spent (min): 5

## 2025-07-01 ENCOUNTER — TRANSCRIBE ORDERS (OUTPATIENT)
Facility: HOSPITAL | Age: 55
End: 2025-07-01

## 2025-07-01 DIAGNOSIS — Z12.31 VISIT FOR SCREENING MAMMOGRAM: Primary | ICD-10-CM

## 2025-07-02 ENCOUNTER — OFFICE VISIT (OUTPATIENT)
Age: 55
End: 2025-07-02
Payer: COMMERCIAL

## 2025-07-02 VITALS
WEIGHT: 185 LBS | SYSTOLIC BLOOD PRESSURE: 129 MMHG | DIASTOLIC BLOOD PRESSURE: 85 MMHG | RESPIRATION RATE: 18 BRPM | TEMPERATURE: 97.7 F | HEIGHT: 64 IN | HEART RATE: 65 BPM | OXYGEN SATURATION: 96 % | BODY MASS INDEX: 31.58 KG/M2

## 2025-07-02 DIAGNOSIS — E11.9 TYPE 2 DIABETES MELLITUS WITHOUT COMPLICATION, WITHOUT LONG-TERM CURRENT USE OF INSULIN (HCC): ICD-10-CM

## 2025-07-02 DIAGNOSIS — E55.9 HYPOVITAMINOSIS D: ICD-10-CM

## 2025-07-02 DIAGNOSIS — I10 ESSENTIAL (PRIMARY) HYPERTENSION: Primary | ICD-10-CM

## 2025-07-02 DIAGNOSIS — Z79.899 ENCOUNTER FOR LONG-TERM (CURRENT) USE OF MEDICATIONS: ICD-10-CM

## 2025-07-02 DIAGNOSIS — K50.90 CROHN'S DISEASE WITHOUT COMPLICATION, UNSPECIFIED GASTROINTESTINAL TRACT LOCATION (HCC): ICD-10-CM

## 2025-07-02 DIAGNOSIS — E66.9 NON MORBID OBESITY: ICD-10-CM

## 2025-07-02 DIAGNOSIS — K21.9 GASTROESOPHAGEAL REFLUX DISEASE, UNSPECIFIED WHETHER ESOPHAGITIS PRESENT: ICD-10-CM

## 2025-07-02 DIAGNOSIS — E87.6 HYPOKALEMIA: ICD-10-CM

## 2025-07-02 DIAGNOSIS — E78.00 HYPERCHOLESTEROLEMIA: ICD-10-CM

## 2025-07-02 LAB
GLUCOSE, POC: 128 MG/DL
HBA1C MFR BLD: 6.6 %

## 2025-07-02 PROCEDURE — 3044F HG A1C LEVEL LT 7.0%: CPT | Performed by: FAMILY MEDICINE

## 2025-07-02 PROCEDURE — 3074F SYST BP LT 130 MM HG: CPT | Performed by: FAMILY MEDICINE

## 2025-07-02 PROCEDURE — 99214 OFFICE O/P EST MOD 30 MIN: CPT | Performed by: FAMILY MEDICINE

## 2025-07-02 PROCEDURE — 3079F DIAST BP 80-89 MM HG: CPT | Performed by: FAMILY MEDICINE

## 2025-07-02 PROCEDURE — 83036 HEMOGLOBIN GLYCOSYLATED A1C: CPT | Performed by: FAMILY MEDICINE

## 2025-07-02 PROCEDURE — 82962 GLUCOSE BLOOD TEST: CPT | Performed by: FAMILY MEDICINE

## 2025-07-02 RX ORDER — POTASSIUM CHLORIDE 750 MG/1
20 TABLET, EXTENDED RELEASE ORAL 2 TIMES DAILY
Qty: 360 TABLET | Refills: 3 | Status: SHIPPED | OUTPATIENT
Start: 2025-07-02

## 2025-07-02 RX ORDER — ATORVASTATIN CALCIUM 10 MG/1
10 TABLET, FILM COATED ORAL DAILY
Qty: 90 TABLET | Refills: 3 | Status: SHIPPED | OUTPATIENT
Start: 2025-07-02

## 2025-07-02 RX ORDER — PANTOPRAZOLE SODIUM 40 MG/1
TABLET, DELAYED RELEASE ORAL
Qty: 90 TABLET | Refills: 3 | Status: SHIPPED | OUTPATIENT
Start: 2025-07-02

## 2025-07-02 RX ORDER — ERGOCALCIFEROL 1.25 MG/1
50000 CAPSULE, LIQUID FILLED ORAL WEEKLY
Qty: 13 CAPSULE | Refills: 3 | Status: SHIPPED | OUTPATIENT
Start: 2025-07-02

## 2025-07-02 RX ORDER — AMLODIPINE BESYLATE 10 MG/1
5 TABLET ORAL DAILY
Qty: 45 TABLET | Refills: 3 | Status: CANCELLED | OUTPATIENT
Start: 2025-07-02

## 2025-07-02 RX ORDER — AMLODIPINE BESYLATE 5 MG/1
5 TABLET ORAL DAILY
Qty: 90 TABLET | Refills: 3 | Status: SHIPPED | OUTPATIENT
Start: 2025-07-02

## 2025-07-02 RX ORDER — METFORMIN HYDROCHLORIDE 500 MG/1
500 TABLET, EXTENDED RELEASE ORAL 2 TIMES DAILY WITH MEALS
Qty: 180 TABLET | Refills: 3 | Status: SHIPPED | OUTPATIENT
Start: 2025-07-02

## 2025-07-02 RX ORDER — OLMESARTAN MEDOXOMIL 5 MG/1
5 TABLET ORAL DAILY
Qty: 90 TABLET | Refills: 3 | Status: SHIPPED | OUTPATIENT
Start: 2025-07-02

## 2025-07-02 SDOH — ECONOMIC STABILITY: FOOD INSECURITY: WITHIN THE PAST 12 MONTHS, THE FOOD YOU BOUGHT JUST DIDN'T LAST AND YOU DIDN'T HAVE MONEY TO GET MORE.: NEVER TRUE

## 2025-07-02 SDOH — ECONOMIC STABILITY: FOOD INSECURITY: WITHIN THE PAST 12 MONTHS, YOU WORRIED THAT YOUR FOOD WOULD RUN OUT BEFORE YOU GOT MONEY TO BUY MORE.: NEVER TRUE

## 2025-07-02 SDOH — ECONOMIC STABILITY: INCOME INSECURITY: IN THE LAST 12 MONTHS, WAS THERE A TIME WHEN YOU WERE NOT ABLE TO PAY THE MORTGAGE OR RENT ON TIME?: NO

## 2025-07-02 SDOH — ECONOMIC STABILITY: TRANSPORTATION INSECURITY
IN THE PAST 12 MONTHS, HAS LACK OF TRANSPORTATION KEPT YOU FROM MEETINGS, WORK, OR FROM GETTING THINGS NEEDED FOR DAILY LIVING?: NO

## 2025-07-02 SDOH — ECONOMIC STABILITY: TRANSPORTATION INSECURITY
IN THE PAST 12 MONTHS, HAS THE LACK OF TRANSPORTATION KEPT YOU FROM MEDICAL APPOINTMENTS OR FROM GETTING MEDICATIONS?: NO

## 2025-07-02 ASSESSMENT — PATIENT HEALTH QUESTIONNAIRE - PHQ9
1. LITTLE INTEREST OR PLEASURE IN DOING THINGS: NOT AT ALL
SUM OF ALL RESPONSES TO PHQ QUESTIONS 1-9: 1
SUM OF ALL RESPONSES TO PHQ QUESTIONS 1-9: 1
2. FEELING DOWN, DEPRESSED OR HOPELESS: SEVERAL DAYS
SUM OF ALL RESPONSES TO PHQ QUESTIONS 1-9: 1
SUM OF ALL RESPONSES TO PHQ QUESTIONS 1-9: 1

## 2025-07-02 ASSESSMENT — ENCOUNTER SYMPTOMS
ABDOMINAL PAIN: 0
CONSTIPATION: 0
RHINORRHEA: 0
COUGH: 0
CHEST TIGHTNESS: 0
BLOOD IN STOOL: 0
SHORTNESS OF BREATH: 0

## 2025-07-02 NOTE — PROGRESS NOTES
Chief Complaint   Patient presents with    Follow-up     Patient is here for a routine follow-up       \"Have you been to the ER, urgent care clinic since your last visit?  Hospitalized since your last visit?\"    NO    “Have you seen or consulted any other health care providers outside of [facility] since your last visit?”    YES, GI doctor            Click Here for Release of Records Request      There were no vitals filed for this visit.    Health Maintenance Due   Topic Date Due    Hepatitis B vaccine (1 of 3 - 19+ 3-dose series) Never done    Shingles vaccine (1 of 2) Never done    Pneumococcal 50+ years Vaccine (2 of 2 - PCV) 10/03/2019    Diabetic retinal exam  2023    COVID-19 Vaccine ( season) 2024    Diabetic Alb to Cr ratio (uACR) test  2024    Diabetic foot exam  2025    Lipids  2025    GFR test (Diabetes, CKD 3-4, OR last GFR 15-59)  2025    A1C test (Diabetic or Prediabetic)  2025    Depression Screen  2025        The patient, Maria M PEARL Guzman, identity was verified by name and .

## 2025-07-02 NOTE — PROGRESS NOTES
Subjective:      Patient ID: Maria M Hinds is a 54 y.o. female.    HPI  Follow up on chronic medical problems.  Overall feeling well.  Has not been seen in the past year.  Has been going for f/u with GI for her Crohn's disease.  Had cln in March.  Switching her to skyrizzi.    Cardiovascular Review:  The patient has hypertension.  Overall has been stable on current medications.    Diet and Lifestyle: generally follows a low fat low cholesterol diet, generally follows a low sodium diet, exercises sporadically  Home BP Monitoring: is not measured at home.  Pertinent ROS: taking medications as instructed, no medication side effects noted, no TIA's, no chest pain on exertion, no dyspnea on exertion, no swelling of ankles, no palpitations.  No headaches or dizziness.  DM type II follow up:  Compliant w/ meds, diabetic diet, and exercise.   Denies any tingling sensation, polyuria and polydipsia.  No blurred vision.  No significant weight changes.  Feeling well since last OV.  We discussed switching to a GLP1.  She will f/u with [facility] once she gets ok from GI to start GLP d/t concerns for ongoing abd pain d/t her Crohns.    HM:  Mammogram 04/29/24  Colonoscopy 3/25/25     Past Medical History:   Diagnosis Date    Crohn disease (HCC)     5/2017    Environmental allergies     GERD (gastroesophageal reflux disease)     Herpes genitalia      Past Surgical History:   Procedure Laterality Date    GYN  1991    Laser- HPV     Current Outpatient Medications   Medication Sig Dispense Refill    potassium chloride (KLOR-CON) 10 MEQ extended release tablet TAKE TWO TABLETS BY MOUTH TWICE A  tablet 1    vitamin D (ERGOCALCIFEROL) 1.25 MG (56437 UT) CAPS capsule Take 1 capsule by mouth once a week 4 capsule 5    amLODIPine (NORVASC) 10 MG tablet Take 0.5 tablets by mouth daily 45 tablet 3    pantoprazole (PROTONIX) 40 MG tablet TAKE ONE TABLET BY MOUTH DAILY AS NEEDED FOR ACID REFLUX 90 tablet 3    atorvastatin (LIPITOR) 10

## 2025-07-03 LAB
25(OH)D3+25(OH)D2 SERPL-MCNC: 27.6 NG/ML (ref 30–100)
ALBUMIN SERPL-MCNC: 4.2 G/DL (ref 3.8–4.9)
ALBUMIN/CREAT UR: 9 MG/G CREAT (ref 0–29)
ALP SERPL-CCNC: 113 IU/L (ref 44–121)
ALT SERPL-CCNC: 9 IU/L (ref 0–32)
APPEARANCE UR: CLEAR
AST SERPL-CCNC: 14 IU/L (ref 0–40)
BACTERIA #/AREA URNS HPF: NORMAL /[HPF]
BILIRUB SERPL-MCNC: 0.2 MG/DL (ref 0–1.2)
BILIRUB UR QL STRIP: NEGATIVE
BUN SERPL-MCNC: 10 MG/DL (ref 6–24)
BUN/CREAT SERPL: 13 (ref 9–23)
CALCIUM SERPL-MCNC: 9.3 MG/DL (ref 8.7–10.2)
CASTS URNS QL MICRO: NORMAL /LPF
CHLORIDE SERPL-SCNC: 103 MMOL/L (ref 96–106)
CHOLEST SERPL-MCNC: 194 MG/DL (ref 100–199)
CO2 SERPL-SCNC: 21 MMOL/L (ref 20–29)
COLOR UR: YELLOW
CREAT SERPL-MCNC: 0.8 MG/DL (ref 0.57–1)
CREAT UR-MCNC: 77.9 MG/DL
EGFRCR SERPLBLD CKD-EPI 2021: 87 ML/MIN/1.73
EPI CELLS #/AREA URNS HPF: NORMAL /HPF (ref 0–10)
ERYTHROCYTE [DISTWIDTH] IN BLOOD BY AUTOMATED COUNT: 15.7 % (ref 11.7–15.4)
FERRITIN SERPL-MCNC: 29 NG/ML (ref 15–150)
FOLATE SERPL-MCNC: 11.7 NG/ML
GLOBULIN SER CALC-MCNC: 2.5 G/DL (ref 1.5–4.5)
GLUCOSE SERPL-MCNC: 101 MG/DL (ref 70–99)
GLUCOSE UR QL STRIP: NEGATIVE
HCT VFR BLD AUTO: 37.3 % (ref 34–46.6)
HDLC SERPL-MCNC: 44 MG/DL
HGB BLD-MCNC: 11.4 G/DL (ref 11.1–15.9)
HGB UR QL STRIP: NEGATIVE
IMP & REVIEW OF LAB RESULTS: NORMAL
IRON SATN MFR SERPL: 10 % (ref 15–55)
IRON SERPL-MCNC: 37 UG/DL (ref 27–159)
KETONES UR QL STRIP: NEGATIVE
LDLC SERPL CALC-MCNC: 130 MG/DL (ref 0–99)
LEUKOCYTE ESTERASE UR QL STRIP: NEGATIVE
Lab: NORMAL
MCH RBC QN AUTO: 23.7 PG (ref 26.6–33)
MCHC RBC AUTO-ENTMCNC: 30.6 G/DL (ref 31.5–35.7)
MCV RBC AUTO: 77 FL (ref 79–97)
MICRO URNS: NORMAL
MICRO URNS: NORMAL
MICROALBUMIN UR-MCNC: 7.4 UG/ML
NITRITE UR QL STRIP: NEGATIVE
PH UR STRIP: 6.5 [PH] (ref 5–7.5)
PLATELET # BLD AUTO: 369 X10E3/UL (ref 150–450)
POTASSIUM SERPL-SCNC: 4.7 MMOL/L (ref 3.5–5.2)
PROT SERPL-MCNC: 6.7 G/DL (ref 6–8.5)
PROT UR QL STRIP: NEGATIVE
RBC # BLD AUTO: 4.82 X10E6/UL (ref 3.77–5.28)
RBC #/AREA URNS HPF: NORMAL /HPF (ref 0–2)
SODIUM SERPL-SCNC: 140 MMOL/L (ref 134–144)
SP GR UR STRIP: 1.01 (ref 1–1.03)
TIBC SERPL-MCNC: 385 UG/DL (ref 250–450)
TRIGL SERPL-MCNC: 109 MG/DL (ref 0–149)
UIBC SERPL-MCNC: 348 UG/DL (ref 131–425)
UROBILINOGEN UR STRIP-MCNC: 0.2 MG/DL (ref 0.2–1)
VIT B12 SERPL-MCNC: 230 PG/ML (ref 232–1245)
VLDLC SERPL CALC-MCNC: 20 MG/DL (ref 5–40)
WBC # BLD AUTO: 7.5 X10E3/UL (ref 3.4–10.8)
WBC #/AREA URNS HPF: NORMAL /HPF (ref 0–5)

## 2025-07-09 ENCOUNTER — HOSPITAL ENCOUNTER (OUTPATIENT)
Facility: HOSPITAL | Age: 55
Discharge: HOME | End: 2025-07-12
Attending: FAMILY MEDICINE
Payer: COMMERCIAL

## 2025-07-09 VITALS — HEIGHT: 64 IN | WEIGHT: 185 LBS | BODY MASS INDEX: 31.58 KG/M2

## 2025-07-09 DIAGNOSIS — Z12.31 VISIT FOR SCREENING MAMMOGRAM: ICD-10-CM

## 2025-07-09 PROCEDURE — 77063 BREAST TOMOSYNTHESIS BI: CPT

## 2025-07-10 ENCOUNTER — RESULTS FOLLOW-UP (OUTPATIENT)
Age: 55
End: 2025-07-10

## 2025-08-07 RX ORDER — ATORVASTATIN CALCIUM 20 MG/1
20 TABLET, FILM COATED ORAL NIGHTLY
Qty: 30 TABLET | Refills: 3 | Status: SHIPPED | OUTPATIENT
Start: 2025-08-07